# Patient Record
Sex: MALE | Race: WHITE | Employment: UNEMPLOYED | URBAN - METROPOLITAN AREA
[De-identification: names, ages, dates, MRNs, and addresses within clinical notes are randomized per-mention and may not be internally consistent; named-entity substitution may affect disease eponyms.]

---

## 2019-02-13 ENCOUNTER — OFFICE VISIT (OUTPATIENT)
Dept: FAMILY MEDICINE CLINIC | Age: 24
End: 2019-02-13
Payer: COMMERCIAL

## 2019-02-13 VITALS
BODY MASS INDEX: 21.5 KG/M2 | DIASTOLIC BLOOD PRESSURE: 70 MMHG | HEIGHT: 67 IN | WEIGHT: 137 LBS | RESPIRATION RATE: 21 BRPM | SYSTOLIC BLOOD PRESSURE: 110 MMHG | HEART RATE: 84 BPM | OXYGEN SATURATION: 98 %

## 2019-02-13 DIAGNOSIS — Z23 NEED FOR TDAP VACCINATION: ICD-10-CM

## 2019-02-13 DIAGNOSIS — F33.0 MILD EPISODE OF RECURRENT MAJOR DEPRESSIVE DISORDER (HCC): Primary | ICD-10-CM

## 2019-02-13 PROCEDURE — 90715 TDAP VACCINE 7 YRS/> IM: CPT | Performed by: NURSE PRACTITIONER

## 2019-02-13 PROCEDURE — G0444 DEPRESSION SCREEN ANNUAL: HCPCS | Performed by: NURSE PRACTITIONER

## 2019-02-13 PROCEDURE — 90471 IMMUNIZATION ADMIN: CPT | Performed by: NURSE PRACTITIONER

## 2019-02-13 PROCEDURE — 99213 OFFICE O/P EST LOW 20 MIN: CPT | Performed by: NURSE PRACTITIONER

## 2019-02-13 RX ORDER — FLUOXETINE 10 MG/1
10 CAPSULE ORAL DAILY
Qty: 30 CAPSULE | Refills: 1 | Status: SHIPPED | OUTPATIENT
Start: 2019-02-13 | End: 2019-07-02 | Stop reason: ALTCHOICE

## 2019-02-13 ASSESSMENT — PATIENT HEALTH QUESTIONNAIRE - PHQ9
8. MOVING OR SPEAKING SO SLOWLY THAT OTHER PEOPLE COULD HAVE NOTICED. OR THE OPPOSITE, BEING SO FIGETY OR RESTLESS THAT YOU HAVE BEEN MOVING AROUND A LOT MORE THAN USUAL: 2
SUM OF ALL RESPONSES TO PHQ9 QUESTIONS 1 & 2: 3
3. TROUBLE FALLING OR STAYING ASLEEP: 1
7. TROUBLE CONCENTRATING ON THINGS, SUCH AS READING THE NEWSPAPER OR WATCHING TELEVISION: 0
5. POOR APPETITE OR OVEREATING: 3
10. IF YOU CHECKED OFF ANY PROBLEMS, HOW DIFFICULT HAVE THESE PROBLEMS MADE IT FOR YOU TO DO YOUR WORK, TAKE CARE OF THINGS AT HOME, OR GET ALONG WITH OTHER PEOPLE: 1
SUM OF ALL RESPONSES TO PHQ QUESTIONS 1-9: 9
9. THOUGHTS THAT YOU WOULD BE BETTER OFF DEAD, OR OF HURTING YOURSELF: 0
1. LITTLE INTEREST OR PLEASURE IN DOING THINGS: 2
4. FEELING TIRED OR HAVING LITTLE ENERGY: 0
6. FEELING BAD ABOUT YOURSELF - OR THAT YOU ARE A FAILURE OR HAVE LET YOURSELF OR YOUR FAMILY DOWN: 0
SUM OF ALL RESPONSES TO PHQ QUESTIONS 1-9: 9
2. FEELING DOWN, DEPRESSED OR HOPELESS: 1

## 2019-07-02 ENCOUNTER — OFFICE VISIT (OUTPATIENT)
Dept: FAMILY MEDICINE CLINIC | Age: 24
End: 2019-07-02
Payer: COMMERCIAL

## 2019-07-02 ENCOUNTER — TELEPHONE (OUTPATIENT)
Dept: FAMILY MEDICINE CLINIC | Age: 24
End: 2019-07-02

## 2019-07-02 VITALS
OXYGEN SATURATION: 99 % | WEIGHT: 137.2 LBS | HEART RATE: 78 BPM | RESPIRATION RATE: 16 BRPM | SYSTOLIC BLOOD PRESSURE: 134 MMHG | HEIGHT: 67 IN | BODY MASS INDEX: 21.53 KG/M2 | DIASTOLIC BLOOD PRESSURE: 84 MMHG

## 2019-07-02 DIAGNOSIS — R41.840 DIFFICULTY CONCENTRATING: Primary | ICD-10-CM

## 2019-07-02 DIAGNOSIS — F33.0 MILD EPISODE OF RECURRENT MAJOR DEPRESSIVE DISORDER (HCC): Primary | ICD-10-CM

## 2019-07-02 PROCEDURE — 99213 OFFICE O/P EST LOW 20 MIN: CPT | Performed by: NURSE PRACTITIONER

## 2019-07-02 RX ORDER — BUPROPION HYDROCHLORIDE 300 MG/1
300 TABLET ORAL EVERY MORNING
Qty: 30 TABLET | Refills: 1 | Status: SHIPPED | OUTPATIENT
Start: 2019-07-02 | End: 2019-07-15

## 2019-07-02 NOTE — PATIENT INSTRUCTIONS
regular visits. Your family or other caregivers should also be alert to changes in your mood or symptoms. It is not known whether this medicine will harm an unborn baby. Tell your doctor if you are pregnant or plan to become pregnant. Bupropion can pass into breast milk and may harm a nursing baby. You should not breast-feed while using this medicine. Bupropion is not approved for use by anyone younger than 25years old. How should I take bupropion? Follow all directions on your prescription label. Do not take this medicine in larger or smaller amounts or for longer than recommended. Too much of this medicine can increase your risk of a seizure. You may take bupropion with or without food. Do not crush, chew, or break an extended-release tablet. Swallow it whole. You should not change your dose or stop using bupropion suddenly, unless you have a seizure while taking this medicine. Stopping suddenly can cause unpleasant withdrawal symptoms. Ask your doctor how to safely stop using bupropion. If you take Zyban to help you stop smoking, you may continue to smoke for about 1 week after you start the medicine. Set a date to quit smoking during the second week of treatment. Talk to your doctor if you have trouble quitting after taking Zyban for 7 weeks. Your doctor may prescribe nicotine patches or gum to help you stop smoking. Do not smoke at any time if you are using a nicotine product along with Zyban. Too much nicotine can cause serious side effects. Read all patient information, medication guides, and instruction sheets provided to you. Ask your doctor or pharmacist if you have any questions. You may have nicotine withdrawal symptoms when you stop smoking, including: increased appetite, weight gain, trouble sleeping, trouble concentrating, slower heart rate, having the urge to smoke, and feeling anxious, restless, depressed, angry, frustrated, or irritated.  These symptoms may occur with or without using medication such as Zyban. Smoking cessation may also cause new or worsening mental health problems, such as depression. Bupropion can cause you to have a false positive drug screening test. If you provide a urine sample for drug screening, tell the laboratory staff that you are taking bupropion. Store at room temperature away from moisture, heat, and light. What happens if I miss a dose? Take the missed dose as soon as you remember. Skip the missed dose if it is almost time for your next scheduled dose. Do not  take extra medicine to make up the missed dose. What happens if I overdose? Seek emergency medical attention or call the Poison Help line at 1-925.575.3589. An overdose of bupropion can be fatal.  Overdose symptoms may include muscle stiffness, hallucinations, fast or uneven heartbeat, shallow breathing, or fainting. What should I avoid while taking bupropion? Drinking alcohol with bupropion may increase your risk of seizures. If you drink alcohol regularly, talk with your doctor before changing the amount you drink. Bupropion can also cause seizures in a regular drinker who suddenly stops drinking at the start of treatment with bupropion. Bupropion may impair your thinking or reactions. Be careful if you drive or do anything that requires you to be alert. What are the possible side effects of bupropion? Get emergency medical help if you have signs of an allergic reaction: hives, rash or itching; fever, swollen glands, joint pain, general ill feeling; difficult breathing; swelling of your face, lips, tongue, or throat. Report any new or worsening symptoms to your doctor, such as: mood or behavior changes, anxiety, depression, panic attacks, trouble sleeping, or if you feel impulsive, irritable, agitated, hostile, aggressive, restless, hyperactive (mentally or physically), or have thoughts about suicide or hurting yourself.   Call your doctor at once if you have:  · a seizure (convulsions);  · unusual changes in mood or behavior;  · a manic episode --racing thoughts, increased energy, reckless behavior, feeling extremely happy or irritable, talking more than usual, severe problems with sleep;  · blurred vision, tunnel vision, eye pain or swelling, or seeing halos around lights;  · fast heartbeats; or  · severe skin reaction --fever, sore throat, swelling in your face or tongue, burning in your eyes, skin pain, followed by a red or purple skin rash that spreads (especially in the face or upper body) and causes blistering and peeling. Common side effects may include:  · dry mouth, stuffy nose;  · nausea, constipation;  · sleep problems (insomnia);  · feeling anxious;  · dizziness; or  · joint pain. This is not a complete list of side effects and others may occur. Call your doctor for medical advice about side effects. You may report side effects to FDA at 8-211-FDA-1247. What other drugs will affect bupropion? You may have a higher risk of seizures if you use certain other medicines while taking bupropion. Many drugs can interact with bupropion. Tell your doctor about all medicines you use, and those you start or stop using during your treatment with bupropion. This includes prescription and over-the-counter medicines, vitamins, and herbal products. Not all possible interactions are listed in this medication guide. Where can I get more information? Your pharmacist can provide more information about bupropion. Remember, keep this and all other medicines out of the reach of children, never share your medicines with others, and use this medication only for the indication prescribed. Every effort has been made to ensure that the information provided by [address] is accurate, up-to-date, and complete, but no guarantee is made to that effect. Drug information contained herein may be time sensitive.  [employer] information has been compiled for use by healthcare

## 2019-07-03 DIAGNOSIS — R41.840 DIFFICULTY CONCENTRATING: Primary | ICD-10-CM

## 2019-07-14 NOTE — PROGRESS NOTES
and thought content normal. His mood appears anxious. His speech is rapid and/or pressured (talking non-stop). He is hyperactive. Cognition and memory are normal.   He is hesitant to take antidepressant, or any medication that causes drowsiness. Vitals reviewed. ASSESSMENT/PLAN:  1. Attention deficit hyperactivity disorder (ADHD), unspecified ADHD type  -start taking Vyvanse daily  - Lisdexamfetamine Dimesylate (VYVANSE) 20 MG CAPS; Take 1 capsule by mouth daily for 30 days. Dispense: 30 capsule; Refill: 0  -Make appointment with Psychiatry    2. QING (generalized anxiety disorder)  - anxiety vs. Bipolar disorder  -Make appointment with Psychiatry  -will consider Buspar/ propanolol     3. Mild episode of recurrent major depressive disorder ([facility]  -Major depression vs bipolar diorder  -Make appointment with Psychiatry  -consider mood stabilizer (Vraylar/ Seroquel)    Return in about 2 weeks (around 7/29/2019) for ADHD, ANXIETY, DEPRESSION.       --HAMLET Jin - CNP on 7/15/2019 at 4:10 PM

## 2019-07-15 ENCOUNTER — OFFICE VISIT (OUTPATIENT)
Dept: INTERNAL MEDICINE CLINIC | Age: 24
End: 2019-07-15
Payer: COMMERCIAL

## 2019-07-15 ENCOUNTER — TELEPHONE (OUTPATIENT)
Dept: INTERNAL MEDICINE CLINIC | Age: 24
End: 2019-07-15

## 2019-07-15 VITALS
SYSTOLIC BLOOD PRESSURE: 115 MMHG | HEIGHT: 67 IN | TEMPERATURE: 97.8 F | OXYGEN SATURATION: 99 % | HEART RATE: 71 BPM | BODY MASS INDEX: 21.66 KG/M2 | WEIGHT: 138 LBS | RESPIRATION RATE: 16 BRPM | DIASTOLIC BLOOD PRESSURE: 80 MMHG

## 2019-07-15 DIAGNOSIS — F41.1 GAD (GENERALIZED ANXIETY DISORDER): ICD-10-CM

## 2019-07-15 DIAGNOSIS — F90.9 ATTENTION DEFICIT HYPERACTIVITY DISORDER (ADHD), UNSPECIFIED ADHD TYPE: Primary | ICD-10-CM

## 2019-07-15 DIAGNOSIS — F33.0 MILD EPISODE OF RECURRENT MAJOR DEPRESSIVE DISORDER (HCC): ICD-10-CM

## 2019-07-15 PROCEDURE — 99213 OFFICE O/P EST LOW 20 MIN: CPT | Performed by: NURSE PRACTITIONER

## 2019-07-15 RX ORDER — VILAZODONE HYDROCHLORIDE 10 MG/1
10 TABLET ORAL DAILY
Qty: 7 TABLET | Status: CANCELLED | OUTPATIENT
Start: 2019-07-15

## 2019-07-15 SDOH — SOCIAL STABILITY: SOCIAL INSECURITY: WITHIN THE LAST YEAR, HAVE YOU BEEN HUMILIATED OR EMOTIONALLY ABUSED IN OTHER WAYS BY YOUR PARTNER OR EX-PARTNER?: NO

## 2019-07-15 SDOH — SOCIAL STABILITY: SOCIAL INSECURITY: WITHIN THE LAST YEAR, HAVE YOU BEEN AFRAID OF YOUR PARTNER OR EX-PARTNER?: NO

## 2019-07-15 SDOH — SOCIAL STABILITY: SOCIAL INSECURITY
WITHIN THE LAST YEAR, HAVE TO BEEN RAPED OR FORCED TO HAVE ANY KIND OF SEXUAL ACTIVITY BY YOUR PARTNER OR EX-PARTNER?: NO

## 2019-07-15 SDOH — SOCIAL STABILITY: SOCIAL INSECURITY
WITHIN THE LAST YEAR, HAVE YOU BEEN KICKED, HIT, SLAPPED, OR OTHERWISE PHYSICALLY HURT BY YOUR PARTNER OR EX-PARTNER?: NO

## 2019-07-15 ASSESSMENT — ENCOUNTER SYMPTOMS
COUGH: 0
SHORTNESS OF BREATH: 0
CHEST TIGHTNESS: 0

## 2019-07-16 PROBLEM — F90.0 ATTENTION DEFICIT HYPERACTIVITY DISORDER (ADHD), PREDOMINANTLY INATTENTIVE TYPE: Status: ACTIVE | Noted: 2019-07-16

## 2019-07-17 ENCOUNTER — TELEPHONE (OUTPATIENT)
Dept: PAIN MANAGEMENT | Age: 24
End: 2019-07-17

## 2019-07-17 NOTE — TELEPHONE ENCOUNTER
Submitted PA for Vyvanse 20MG capsules, Key: [id number] - PA Case ID: [id number]  Via CMM STATUS: PENDING

## 2019-07-18 ENCOUNTER — TELEPHONE (OUTPATIENT)
Dept: INTERNAL MEDICINE CLINIC | Age: 24
End: 2019-07-18

## 2019-07-18 NOTE — TELEPHONE ENCOUNTER
Received APPROVAL for Vyvanse 20MG capsules from 06/17/2019 through 08/05/2019. Please advise patient. Thank you.

## 2019-07-29 ENCOUNTER — OFFICE VISIT (OUTPATIENT)
Dept: INTERNAL MEDICINE CLINIC | Age: 24
End: 2019-07-29
Payer: COMMERCIAL

## 2019-07-29 VITALS
HEIGHT: 67 IN | BODY MASS INDEX: 21.72 KG/M2 | SYSTOLIC BLOOD PRESSURE: 137 MMHG | HEART RATE: 96 BPM | DIASTOLIC BLOOD PRESSURE: 79 MMHG | OXYGEN SATURATION: 99 % | WEIGHT: 138.4 LBS

## 2019-07-29 DIAGNOSIS — F90.0 ATTENTION DEFICIT HYPERACTIVITY DISORDER (ADHD), PREDOMINANTLY INATTENTIVE TYPE: ICD-10-CM

## 2019-07-29 DIAGNOSIS — F33.0 MILD EPISODE OF RECURRENT MAJOR DEPRESSIVE DISORDER (HCC): ICD-10-CM

## 2019-07-29 DIAGNOSIS — F41.1 GAD (GENERALIZED ANXIETY DISORDER): Primary | ICD-10-CM

## 2019-07-29 DIAGNOSIS — Z00.00 HEALTHCARE MAINTENANCE: ICD-10-CM

## 2019-07-29 LAB — HBA1C MFR BLD: 5.3 %

## 2019-07-29 PROCEDURE — 99213 OFFICE O/P EST LOW 20 MIN: CPT | Performed by: NURSE PRACTITIONER

## 2019-07-29 PROCEDURE — 83036 HEMOGLOBIN GLYCOSYLATED A1C: CPT | Performed by: NURSE PRACTITIONER

## 2019-07-29 ASSESSMENT — ENCOUNTER SYMPTOMS
CONSTIPATION: 0
CHEST TIGHTNESS: 0
DIARRHEA: 0
ABDOMINAL PAIN: 0
SHORTNESS OF BREATH: 0

## 2019-07-29 NOTE — PROGRESS NOTES
Patient was informed that the HIV testing will be included in there blood work. Patient is ok with having the test included.

## 2019-07-30 LAB
A/G RATIO: 2.6 (ref 1.1–2.2)
ALBUMIN SERPL-MCNC: 5.2 G/DL (ref 3.4–5)
ALP BLD-CCNC: 62 U/L (ref 40–129)
ALT SERPL-CCNC: 14 U/L (ref 10–40)
ANION GAP SERPL CALCULATED.3IONS-SCNC: 15 MMOL/L (ref 3–16)
AST SERPL-CCNC: 20 U/L (ref 15–37)
BILIRUB SERPL-MCNC: 0.3 MG/DL (ref 0–1)
BUN BLDV-MCNC: 12 MG/DL (ref 7–20)
CALCIUM SERPL-MCNC: 9.7 MG/DL (ref 8.3–10.6)
CHLORIDE BLD-SCNC: 101 MMOL/L (ref 99–110)
CO2: 24 MMOL/L (ref 21–32)
CREAT SERPL-MCNC: 1 MG/DL (ref 0.9–1.3)
ESTIMATED AVERAGE GLUCOSE: 116.9 MG/DL
GFR AFRICAN AMERICAN: >60
GFR NON-AFRICAN AMERICAN: >60
GLOBULIN: 2 G/DL
GLUCOSE BLD-MCNC: 136 MG/DL (ref 70–99)
HBA1C MFR BLD: 5.7 %
HIV AG/AB: NORMAL
HIV ANTIGEN: NORMAL
HIV-1 ANTIBODY: NORMAL
HIV-2 AB: NORMAL
POTASSIUM SERPL-SCNC: 4.2 MMOL/L (ref 3.5–5.1)
SODIUM BLD-SCNC: 140 MMOL/L (ref 136–145)
T4 FREE: 1.5 NG/DL (ref 0.9–1.8)
TOTAL PROTEIN: 7.2 G/DL (ref 6.4–8.2)
TSH REFLEX: 1.36 UIU/ML (ref 0.27–4.2)

## 2019-08-02 LAB
6-ACETYLMORPHINE: NOT DETECTED
7-AMINOCLONAZEPAM: NOT DETECTED
ALPHA-OH-ALPRAZOLAM: NOT DETECTED
ALPRAZOLAM: NOT DETECTED
AMPHETAMINE: PRESENT
BARBITURATES: NOT DETECTED
BENZOYLECGONINE: NOT DETECTED
BUPRENORPHINE: NOT DETECTED
CARISOPRODOL: NOT DETECTED
CLONAZEPAM: NOT DETECTED
CODEINE: NOT DETECTED
CREATININE URINE: 108 MG/DL (ref 20–400)
DIAZEPAM: NOT DETECTED
DRUGS EXPECTED: NORMAL
EER PAIN MGT DRUG PANEL, HIGH RES/EMIT U: NORMAL
ETHYL GLUCURONIDE: NOT DETECTED
FENTANYL: NOT DETECTED
HYDROCODONE: NOT DETECTED
HYDROMORPHONE: NOT DETECTED
LORAZEPAM: NOT DETECTED
MARIJUANA METABOLITE: PRESENT
MDA: NOT DETECTED
MDEA: NOT DETECTED
MDMA URINE: NOT DETECTED
MEPERIDINE: NOT DETECTED
METHADONE: NOT DETECTED
METHAMPHETAMINE: NOT DETECTED
METHYLPHENIDATE: NOT DETECTED
MIDAZOLAM: NOT DETECTED
MORPHINE: NOT DETECTED
NORBUPRENORPHINE, FREE: NOT DETECTED
NORDIAZEPAM: NOT DETECTED
NORFENTANYL: NOT DETECTED
NORHYDROCODONE, URINE: NOT DETECTED
NOROXYCODONE: NOT DETECTED
NOROXYMORPHONE, URINE: NOT DETECTED
OXAZEPAM: NOT DETECTED
OXYCODONE: NOT DETECTED
OXYMORPHONE: NOT DETECTED
PAIN MANAGEMENT DRUG PANEL: NORMAL
PAIN MANAGEMENT DRUG PANEL: NORMAL
PCP: NOT DETECTED
PHENTERMINE: NOT DETECTED
PROPOXYPHENE: NOT DETECTED
TAPENTADOL, URINE: NOT DETECTED
TAPENTADOL-O-SULFATE, URINE: NOT DETECTED
TEMAZEPAM: NOT DETECTED
TRAMADOL: NOT DETECTED
ZOLPIDEM: NOT DETECTED

## 2019-08-23 ENCOUNTER — TELEPHONE (OUTPATIENT)
Dept: INTERNAL MEDICINE CLINIC | Age: 24
End: 2019-08-23

## 2019-09-03 ENCOUNTER — TELEPHONE (OUTPATIENT)
Dept: INTERNAL MEDICINE CLINIC | Age: 24
End: 2019-09-03

## 2019-09-03 ENCOUNTER — OFFICE VISIT (OUTPATIENT)
Dept: INTERNAL MEDICINE CLINIC | Age: 24
End: 2019-09-03
Payer: COMMERCIAL

## 2019-09-03 VITALS
DIASTOLIC BLOOD PRESSURE: 84 MMHG | WEIGHT: 139 LBS | SYSTOLIC BLOOD PRESSURE: 116 MMHG | OXYGEN SATURATION: 99 % | HEART RATE: 86 BPM | BODY MASS INDEX: 21.64 KG/M2

## 2019-09-03 DIAGNOSIS — Z72.0 TOBACCO ABUSE: ICD-10-CM

## 2019-09-03 DIAGNOSIS — F41.1 GAD (GENERALIZED ANXIETY DISORDER): ICD-10-CM

## 2019-09-03 DIAGNOSIS — F90.0 ATTENTION DEFICIT HYPERACTIVITY DISORDER (ADHD), PREDOMINANTLY INATTENTIVE TYPE: Primary | ICD-10-CM

## 2019-09-03 PROCEDURE — 99213 OFFICE O/P EST LOW 20 MIN: CPT | Performed by: NURSE PRACTITIONER

## 2019-09-03 ASSESSMENT — ENCOUNTER SYMPTOMS
CONSTIPATION: 0
ABDOMINAL PAIN: 0
SHORTNESS OF BREATH: 0
CHEST TIGHTNESS: 0
DIARRHEA: 0

## 2019-09-04 ENCOUNTER — TELEPHONE (OUTPATIENT)
Dept: INTERNAL MEDICINE CLINIC | Age: 24
End: 2019-09-04

## 2019-09-04 NOTE — TELEPHONE ENCOUNTER
vynase is not covered under insurance . Need something else. Allergies see list. xiomara [phone]. Also please call him back at [phone].

## 2019-09-06 DIAGNOSIS — F90.0 ATTENTION DEFICIT HYPERACTIVITY DISORDER (ADHD), PREDOMINANTLY INATTENTIVE TYPE: Primary | ICD-10-CM

## 2019-09-06 RX ORDER — DEXTROAMPHETAMINE SACCHARATE, AMPHETAMINE ASPARTATE, DEXTROAMPHETAMINE SULFATE AND AMPHETAMINE SULFATE 5; 5; 5; 5 MG/1; MG/1; MG/1; MG/1
20 TABLET ORAL 2 TIMES DAILY
Qty: 60 TABLET | Refills: 0 | Status: SHIPPED | OUTPATIENT
Start: 2019-09-06 | End: 2019-10-11

## 2019-09-11 ENCOUNTER — OFFICE VISIT (OUTPATIENT)
Dept: INTERNAL MEDICINE CLINIC | Age: 24
End: 2019-09-11
Payer: COMMERCIAL

## 2019-09-11 ENCOUNTER — TELEPHONE (OUTPATIENT)
Dept: INTERNAL MEDICINE CLINIC | Age: 24
End: 2019-09-11

## 2019-09-11 VITALS
SYSTOLIC BLOOD PRESSURE: 137 MMHG | WEIGHT: 137.6 LBS | HEIGHT: 67 IN | OXYGEN SATURATION: 98 % | HEART RATE: 96 BPM | DIASTOLIC BLOOD PRESSURE: 81 MMHG | BODY MASS INDEX: 21.6 KG/M2

## 2019-09-11 DIAGNOSIS — F90.0 ATTENTION DEFICIT HYPERACTIVITY DISORDER (ADHD), PREDOMINANTLY INATTENTIVE TYPE: Primary | ICD-10-CM

## 2019-09-11 PROCEDURE — 90686 IIV4 VACC NO PRSV 0.5 ML IM: CPT | Performed by: NURSE PRACTITIONER

## 2019-09-11 PROCEDURE — 99212 OFFICE O/P EST SF 10 MIN: CPT | Performed by: NURSE PRACTITIONER

## 2019-09-11 PROCEDURE — 90471 IMMUNIZATION ADMIN: CPT | Performed by: NURSE PRACTITIONER

## 2019-09-11 ASSESSMENT — ENCOUNTER SYMPTOMS
SHORTNESS OF BREATH: 0
CHEST TIGHTNESS: 0

## 2019-09-11 NOTE — PROGRESS NOTES
20MG,) 20 MG tablet Take 1 tablet by mouth 2 times daily for 30 days. Yes HAMLET Bauer CNP        Social History     Tobacco Use    Smoking status: Former Smoker     Packs/day: 0.25     Years: 8.00     Pack years: 2.00     Types: Cigarettes     Start date: 2007     Last attempt to quit: 7/3/2019     Years since quittin.1    Smokeless tobacco: Never Used    Tobacco comment: using E cig   Substance Use Topics    Alcohol use: No     Alcohol/week: 0.0 standard drinks     Comment: rarely        Vitals:    19 1342   BP: 137/81   Pulse: 96   SpO2: 98%   Weight: 137 lb 9.6 oz (62.4 kg)   Height: 5' 7.2\" (1.707 m)     Estimated body mass index is 21.42 kg/m² as calculated from the following:    Height as of this encounter: 5' 7.2\" (1.707 m). Weight as of this encounter: 137 lb 9.6 oz (62.4 kg). Physical Exam   Constitutional: He is oriented to person, place, and time. Vital signs are normal. He appears well-developed and well-nourished. He is cooperative. HENT:   Head: Normocephalic. Cardiovascular: Normal rate, regular rhythm, S1 normal, S2 normal, normal heart sounds and intact distal pulses. Pulmonary/Chest: Effort normal and breath sounds normal.   Neurological: He is alert and oriented to person, place, and time. Skin: Skin is warm and dry. ASSESSMENT/PLAN:  1. Attention deficit hyperactivity disorder (ADHD), predominantly inattentive type  Increase water intake  4-6 servings of fruits and vegetables daily  Drink 4-6 glasses of water daily  Take Adderall 20 mg once daily, if needed in the afternoon take 10 mg. No follow-ups on file.         --HAMLET Bauer CNP on 2019 at 4:27 PM

## 2019-09-17 ENCOUNTER — TELEPHONE (OUTPATIENT)
Dept: INTERNAL MEDICINE CLINIC | Age: 24
End: 2019-09-17

## 2019-10-11 ENCOUNTER — OFFICE VISIT (OUTPATIENT)
Dept: PSYCHIATRY | Age: 24
End: 2019-10-11
Payer: COMMERCIAL

## 2019-10-11 VITALS
WEIGHT: 141.8 LBS | BODY MASS INDEX: 22.26 KG/M2 | DIASTOLIC BLOOD PRESSURE: 82 MMHG | SYSTOLIC BLOOD PRESSURE: 129 MMHG | HEART RATE: 88 BPM | HEIGHT: 67 IN

## 2019-10-11 DIAGNOSIS — F90.0 ATTENTION DEFICIT HYPERACTIVITY DISORDER (ADHD), PREDOMINANTLY INATTENTIVE TYPE: ICD-10-CM

## 2019-10-11 PROCEDURE — 99204 OFFICE O/P NEW MOD 45 MIN: CPT | Performed by: NURSE PRACTITIONER

## 2019-10-11 RX ORDER — DEXTROAMPHETAMINE SACCHARATE, AMPHETAMINE ASPARTATE, DEXTROAMPHETAMINE SULFATE AND AMPHETAMINE SULFATE 5; 5; 5; 5 MG/1; MG/1; MG/1; MG/1
20 TABLET ORAL 2 TIMES DAILY
Qty: 60 TABLET | Refills: 0 | Status: SHIPPED | OUTPATIENT
Start: 2019-10-11 | End: 2019-11-07 | Stop reason: SDUPTHER

## 2019-10-11 ASSESSMENT — PATIENT HEALTH QUESTIONNAIRE - PHQ9
SUM OF ALL RESPONSES TO PHQ9 QUESTIONS 1 & 2: 0
9. THOUGHTS THAT YOU WOULD BE BETTER OFF DEAD, OR OF HURTING YOURSELF: 0
7. TROUBLE CONCENTRATING ON THINGS, SUCH AS READING THE NEWSPAPER OR WATCHING TELEVISION: 2
3. TROUBLE FALLING OR STAYING ASLEEP: 1
5. POOR APPETITE OR OVEREATING: 2
4. FEELING TIRED OR HAVING LITTLE ENERGY: 0
SUM OF ALL RESPONSES TO PHQ QUESTIONS 1-9: 7
1. LITTLE INTEREST OR PLEASURE IN DOING THINGS: 0
8. MOVING OR SPEAKING SO SLOWLY THAT OTHER PEOPLE COULD HAVE NOTICED. OR THE OPPOSITE, BEING SO FIGETY OR RESTLESS THAT YOU HAVE BEEN MOVING AROUND A LOT MORE THAN USUAL: 2
SUM OF ALL RESPONSES TO PHQ QUESTIONS 1-9: 7
2. FEELING DOWN, DEPRESSED OR HOPELESS: 0
10. IF YOU CHECKED OFF ANY PROBLEMS, HOW DIFFICULT HAVE THESE PROBLEMS MADE IT FOR YOU TO DO YOUR WORK, TAKE CARE OF THINGS AT HOME, OR GET ALONG WITH OTHER PEOPLE: 1
6. FEELING BAD ABOUT YOURSELF - OR THAT YOU ARE A FAILURE OR HAVE LET YOURSELF OR YOUR FAMILY DOWN: 0

## 2019-10-11 ASSESSMENT — ANXIETY QUESTIONNAIRES
4. TROUBLE RELAXING: 2-OVER HALF THE DAYS
7. FEELING AFRAID AS IF SOMETHING AWFUL MIGHT HAPPEN: 0-NOT AT ALL SURE
5. BEING SO RESTLESS THAT IT IS HARD TO SIT STILL: 0-NOT AT ALL SURE
2. NOT BEING ABLE TO STOP OR CONTROL WORRYING: 0-NOT AT ALL SURE
1. FEELING NERVOUS, ANXIOUS, OR ON EDGE: 2-OVER HALF THE DAYS
GAD7 TOTAL SCORE: 6
3. WORRYING TOO MUCH ABOUT DIFFERENT THINGS: 2-OVER HALF THE DAYS
6. BECOMING EASILY ANNOYED OR IRRITABLE: 0-NOT AT ALL SURE

## 2019-11-05 DIAGNOSIS — F90.0 ATTENTION DEFICIT HYPERACTIVITY DISORDER (ADHD), PREDOMINANTLY INATTENTIVE TYPE: ICD-10-CM

## 2019-11-07 RX ORDER — DEXTROAMPHETAMINE SACCHARATE, AMPHETAMINE ASPARTATE, DEXTROAMPHETAMINE SULFATE AND AMPHETAMINE SULFATE 5; 5; 5; 5 MG/1; MG/1; MG/1; MG/1
20 TABLET ORAL 2 TIMES DAILY
Qty: 60 TABLET | Refills: 0 | Status: SHIPPED | OUTPATIENT
Start: 2019-11-07 | End: 2019-12-02 | Stop reason: SDUPTHER

## 2019-12-02 ENCOUNTER — OFFICE VISIT (OUTPATIENT)
Dept: INTERNAL MEDICINE CLINIC | Age: 24
End: 2019-12-02
Payer: COMMERCIAL

## 2019-12-02 VITALS
SYSTOLIC BLOOD PRESSURE: 116 MMHG | OXYGEN SATURATION: 98 % | HEART RATE: 91 BPM | WEIGHT: 140.8 LBS | BODY MASS INDEX: 21.34 KG/M2 | DIASTOLIC BLOOD PRESSURE: 80 MMHG | HEIGHT: 68 IN

## 2019-12-02 DIAGNOSIS — F90.0 ATTENTION DEFICIT HYPERACTIVITY DISORDER (ADHD), PREDOMINANTLY INATTENTIVE TYPE: ICD-10-CM

## 2019-12-02 PROCEDURE — 99212 OFFICE O/P EST SF 10 MIN: CPT | Performed by: NURSE PRACTITIONER

## 2019-12-02 RX ORDER — DEXTROAMPHETAMINE SACCHARATE, AMPHETAMINE ASPARTATE, DEXTROAMPHETAMINE SULFATE AND AMPHETAMINE SULFATE 5; 5; 5; 5 MG/1; MG/1; MG/1; MG/1
20 TABLET ORAL 2 TIMES DAILY
Qty: 60 TABLET | Refills: 0 | Status: SHIPPED | OUTPATIENT
Start: 2020-01-01 | End: 2020-03-31

## 2019-12-02 RX ORDER — DEXTROAMPHETAMINE SACCHARATE, AMPHETAMINE ASPARTATE, DEXTROAMPHETAMINE SULFATE AND AMPHETAMINE SULFATE 5; 5; 5; 5 MG/1; MG/1; MG/1; MG/1
20 TABLET ORAL 2 TIMES DAILY
Qty: 60 TABLET | Refills: 0 | Status: SHIPPED | OUTPATIENT
Start: 2020-02-01 | End: 2020-03-31

## 2019-12-02 RX ORDER — DEXTROAMPHETAMINE SACCHARATE, AMPHETAMINE ASPARTATE, DEXTROAMPHETAMINE SULFATE AND AMPHETAMINE SULFATE 5; 5; 5; 5 MG/1; MG/1; MG/1; MG/1
20 TABLET ORAL 2 TIMES DAILY
Qty: 60 TABLET | Refills: 0 | Status: SHIPPED | OUTPATIENT
Start: 2019-12-02 | End: 2020-03-04 | Stop reason: SDUPTHER

## 2019-12-02 ASSESSMENT — ENCOUNTER SYMPTOMS
SHORTNESS OF BREATH: 0
CHEST TIGHTNESS: 0

## 2020-03-04 RX ORDER — DEXTROAMPHETAMINE SACCHARATE, AMPHETAMINE ASPARTATE, DEXTROAMPHETAMINE SULFATE AND AMPHETAMINE SULFATE 5; 5; 5; 5 MG/1; MG/1; MG/1; MG/1
20 TABLET ORAL 2 TIMES DAILY
Qty: 60 TABLET | Refills: 0 | Status: SHIPPED | OUTPATIENT
Start: 2020-03-04 | End: 2020-03-30 | Stop reason: SDUPTHER

## 2020-03-05 ENCOUNTER — OFFICE VISIT (OUTPATIENT)
Dept: INTERNAL MEDICINE CLINIC | Age: 25
End: 2020-03-05

## 2020-03-05 VITALS
HEIGHT: 67 IN | DIASTOLIC BLOOD PRESSURE: 70 MMHG | BODY MASS INDEX: 22.22 KG/M2 | SYSTOLIC BLOOD PRESSURE: 116 MMHG | OXYGEN SATURATION: 96 % | HEART RATE: 106 BPM | RESPIRATION RATE: 16 BRPM | WEIGHT: 141.6 LBS

## 2020-03-05 PROCEDURE — 99212 OFFICE O/P EST SF 10 MIN: CPT | Performed by: NURSE PRACTITIONER

## 2020-03-05 SDOH — ECONOMIC STABILITY: TRANSPORTATION INSECURITY
IN THE PAST 12 MONTHS, HAS LACK OF TRANSPORTATION KEPT YOU FROM MEETINGS, WORK, OR FROM GETTING THINGS NEEDED FOR DAILY LIVING?: NO

## 2020-03-05 SDOH — ECONOMIC STABILITY: FOOD INSECURITY: WITHIN THE PAST 12 MONTHS, YOU WORRIED THAT YOUR FOOD WOULD RUN OUT BEFORE YOU GOT MONEY TO BUY MORE.: NEVER TRUE

## 2020-03-05 SDOH — ECONOMIC STABILITY: TRANSPORTATION INSECURITY
IN THE PAST 12 MONTHS, HAS THE LACK OF TRANSPORTATION KEPT YOU FROM MEDICAL APPOINTMENTS OR FROM GETTING MEDICATIONS?: NO

## 2020-03-05 SDOH — ECONOMIC STABILITY: FOOD INSECURITY: WITHIN THE PAST 12 MONTHS, THE FOOD YOU BOUGHT JUST DIDN'T LAST AND YOU DIDN'T HAVE MONEY TO GET MORE.: NEVER TRUE

## 2020-03-05 ASSESSMENT — ENCOUNTER SYMPTOMS
DIARRHEA: 0
SHORTNESS OF BREATH: 0
CONSTIPATION: 0
ABDOMINAL PAIN: 0

## 2020-03-05 ASSESSMENT — PATIENT HEALTH QUESTIONNAIRE - PHQ9
SUM OF ALL RESPONSES TO PHQ9 QUESTIONS 1 & 2: 1
SUM OF ALL RESPONSES TO PHQ QUESTIONS 1-9: 1
2. FEELING DOWN, DEPRESSED OR HOPELESS: 1
SUM OF ALL RESPONSES TO PHQ QUESTIONS 1-9: 1
1. LITTLE INTEREST OR PLEASURE IN DOING THINGS: 0

## 2020-03-05 NOTE — PROGRESS NOTES
[address] [facility]  [address]  [address] [facility] [place] [address]  Dept: [phone]       3/5/2020     Lewis Porter (:  1995)is a 30 y.o. male, here for evaluation of the following medical concerns: This is an established patient being seen today for ADHD. He reports having several job ventures which he is excited about, living with his mother, raising 3year old daughter without the daughters mother involved. HPI  ADD/ADHD:  Current treatment: Adderall- 20 mg twice daily, which has been effective. Residual symptoms: none. Medication side effects: poor appetite. Patient denies nausea, vomiting, abdominal pain, palpitations, irritability and anxiety. Lab Results   Component Value Date    WBC 7.3 2016    HGB 14.6 2016    HCT 43.6 2016    MCV 92.1 2016     2016     Lab Results   Component Value Date     2019    K 4.2 2019     2019    CO2 24 2019    BUN 12 2019    CREATININE 1.0 2019    GLUCOSE 136 (H) 2019    CALCIUM 9.7 2019    PROT 7.2 2019    LABALBU 5.2 (H) 2019    BILITOT 0.3 2019    ALKPHOS 62 2019    AST 20 2019    ALT 14 2019    LABGLOM >60 2019    GFRAA >60 2019    AGRATIO 2.6 (H) 2019    GLOB 2.0 2019       Review of Systems   Constitutional: Positive for appetite change (decreased appetite). Negative for activity change and fever. HENT: Negative for congestion. Eyes: Negative for visual disturbance. Respiratory: Negative for shortness of breath. Cardiovascular: Negative for chest pain, palpitations and leg swelling. Gastrointestinal: Negative for abdominal pain, constipation and diarrhea. Genitourinary: Negative for dysuria. Musculoskeletal: Negative for arthralgias and myalgias. Skin: Negative for rash. Neurological: Negative for dizziness, light-headedness and headaches.

## 2020-04-02 RX ORDER — DEXTROAMPHETAMINE SACCHARATE, AMPHETAMINE ASPARTATE, DEXTROAMPHETAMINE SULFATE AND AMPHETAMINE SULFATE 5; 5; 5; 5 MG/1; MG/1; MG/1; MG/1
20 TABLET ORAL 2 TIMES DAILY
Qty: 14 TABLET | Refills: 0 | Status: SHIPPED | OUTPATIENT
Start: 2020-04-02 | End: 2020-04-09 | Stop reason: SDUPTHER

## 2020-04-09 ENCOUNTER — VIRTUAL VISIT (OUTPATIENT)
Dept: INTERNAL MEDICINE CLINIC | Age: 25
End: 2020-04-09

## 2020-04-09 ENCOUNTER — TELEPHONE (OUTPATIENT)
Dept: INTERNAL MEDICINE CLINIC | Age: 25
End: 2020-04-09

## 2020-04-09 PROCEDURE — 99212 OFFICE O/P EST SF 10 MIN: CPT | Performed by: NURSE PRACTITIONER

## 2020-04-09 RX ORDER — DEXTROAMPHETAMINE SACCHARATE, AMPHETAMINE ASPARTATE, DEXTROAMPHETAMINE SULFATE AND AMPHETAMINE SULFATE 5; 5; 5; 5 MG/1; MG/1; MG/1; MG/1
20 TABLET ORAL 2 TIMES DAILY
Qty: 60 TABLET | Refills: 0 | Status: SHIPPED | OUTPATIENT
Start: 2020-04-09 | End: 2020-05-04 | Stop reason: SDUPTHER

## 2020-04-09 NOTE — TELEPHONE ENCOUNTER
Patient called back and said Dr. Fred Mary only filled for 7 days while PCP on vacay for a week. He will be taking the last one today. Wanting the rest of the Rx called in today if possible. Please call pt back to advise.

## 2020-04-09 NOTE — PROGRESS NOTES
[address] [facility]  [address] [address]  Dept: [phone]       2020     Mallorie Barrett (:  1995)is a 30 y.o. male, here for evaluation of the following medical concerns:  Due to the COVID-19 pandemic restrictions on close contact interactions as recommended by CDC and health authorities, the patient's visit was conducted via telemedicine in lieu of a face to face visit. Patient verbally consented and agreed to proceed. HPI  He reports having several job ventures which he is excited about, living with his mother, raising 3year old daughter. ADD/ADHD:  Current treatment: Adderall- 20 mg twice daily, which has been effective. Residual symptoms: none. Medication side effects: poor appetite. Patient denies nausea, vomiting, abdominal pain, palpitations, irritability. He would like to switch back to Vyvanse once insurance changes. Lab Results   Component Value Date    WBC 7.3 2016    HGB 14.6 2016    HCT 43.6 2016    MCV 92.1 2016     2016     Lab Results   Component Value Date     2019    K 4.2 2019     2019    CO2 24 2019    BUN 12 2019    CREATININE 1.0 2019    GLUCOSE 136 (H) 2019    CALCIUM 9.7 2019    PROT 7.2 2019    LABALBU 5.2 (H) 2019    BILITOT 0.3 2019    ALKPHOS 62 2019    AST 20 2019    ALT 14 2019    LABGLOM >60 2019    GFRAA >60 2019    AGRATIO 2.6 (H) 2019    GLOB 2.0 2019       Review of Systems    Prior to Visit Medications    Medication Sig Taking? Authorizing Provider   amphetamine-dextroamphetamine (ADDERALL, 20MG,) 20 MG tablet Take 1 tablet by mouth 2 times daily for 30 days.  Yes HAMLET Shukla CNP        Social History     Tobacco Use    Smoking status: Former Smoker     Packs/day: 0.25     Years: 8.00     Pack years: 2.00     Types: Cigarettes Start date: 2007     Last attempt to quit: 7/3/2019     Years since quittin.7    Smokeless tobacco: Never Used    Tobacco comment: using E cig   Substance Use Topics    Alcohol use: No     Alcohol/week: 0.0 standard drinks     Comment: rarely        There were no vitals filed for this visit. Estimated body mass index is 22.18 kg/m² as calculated from the following:    Height as of 3/5/20: 5' 7\" (1.702 m). Weight as of 3/5/20: 141 lb 9.6 oz (64.2 kg). Limited assessment due to virtual visit  Physical Exam  Alert and oriented X3. Able to follow commands. Speech and behavioral is normal  ASSESSMENT/PLAN:  1. Attention deficit hyperactivity disorder (ADHD), predominantly inattentive type  -Continue current medications. - amphetamine-dextroamphetamine (ADDERALL, 20MG,) 20 MG tablet; Take 1 tablet by mouth 2 times daily for 30 days. Dispense: 60 tablet; Refill: 0      No follow-ups on file. Maikel Levin is a 30 y.o. male being evaluated by a Virtual Visit (video visit) encounter to address concerns as mentioned above. A caregiver was present when appropriate. Due to this being a TeleHealth encounter (During [place] [address] public health emergency), evaluation of the following organ systems was limited: Vitals/Constitutional/EENT/Resp/CV/GI//MS/Neuro/Skin/Heme-Lymph-Imm. Pursuant to the emergency declaration under the [address] [place] [address] and the [employer] and [employer] Act, this Virtual Visit was conducted with patient's (and/or legal guardian's) consent, to reduce the patient's risk of exposure to COVID-19 and provide necessary medical care. The patient (and/or legal guardian) has also been advised to contact this office for worsening conditions or problems, and seek emergency medical treatment and/or call 911 if deemed necessary.      Services were provided through a video synchronous discussion virtually to substitute for in-person clinic visit. Patient and provider were located at their individual homes. --HAMLET Berkowitz CNP on 4/9/2020 at 2:06 PM    An electronic signature was used to authenticate this note.     --HAMLET Berkowitz CNP on 4/9/2020 at 2:06 PM

## 2020-05-04 RX ORDER — DEXTROAMPHETAMINE SACCHARATE, AMPHETAMINE ASPARTATE, DEXTROAMPHETAMINE SULFATE AND AMPHETAMINE SULFATE 5; 5; 5; 5 MG/1; MG/1; MG/1; MG/1
20 TABLET ORAL 2 TIMES DAILY
Qty: 60 TABLET | Refills: 0 | Status: SHIPPED | OUTPATIENT
Start: 2020-05-04 | End: 2020-06-04 | Stop reason: SDUPTHER

## 2020-06-04 ENCOUNTER — VIRTUAL VISIT (OUTPATIENT)
Dept: INTERNAL MEDICINE CLINIC | Age: 25
End: 2020-06-04

## 2020-06-04 ENCOUNTER — TELEPHONE (OUTPATIENT)
Dept: INTERNAL MEDICINE CLINIC | Age: 25
End: 2020-06-04

## 2020-06-04 PROCEDURE — 99211 OFF/OP EST MAY X REQ PHY/QHP: CPT | Performed by: NURSE PRACTITIONER

## 2020-06-04 RX ORDER — DEXTROAMPHETAMINE SACCHARATE, AMPHETAMINE ASPARTATE, DEXTROAMPHETAMINE SULFATE AND AMPHETAMINE SULFATE 5; 5; 5; 5 MG/1; MG/1; MG/1; MG/1
20 TABLET ORAL 2 TIMES DAILY
Qty: 60 TABLET | Refills: 0 | Status: SHIPPED | OUTPATIENT
Start: 2020-06-04 | End: 2020-09-02 | Stop reason: SDUPTHER

## 2020-06-04 RX ORDER — DEXTROAMPHETAMINE SACCHARATE, AMPHETAMINE ASPARTATE, DEXTROAMPHETAMINE SULFATE AND AMPHETAMINE SULFATE 5; 5; 5; 5 MG/1; MG/1; MG/1; MG/1
20 TABLET ORAL 2 TIMES DAILY
Qty: 60 TABLET | Refills: 0 | Status: SHIPPED | OUTPATIENT
Start: 2020-08-03 | End: 2020-09-02 | Stop reason: SDUPTHER

## 2020-06-04 RX ORDER — DEXTROAMPHETAMINE SACCHARATE, AMPHETAMINE ASPARTATE, DEXTROAMPHETAMINE SULFATE AND AMPHETAMINE SULFATE 5; 5; 5; 5 MG/1; MG/1; MG/1; MG/1
20 TABLET ORAL 2 TIMES DAILY
Qty: 60 TABLET | Refills: 0 | Status: SHIPPED | OUTPATIENT
Start: 2020-07-03 | End: 2020-09-02 | Stop reason: SDUPTHER

## 2020-06-04 ASSESSMENT — ENCOUNTER SYMPTOMS
ABDOMINAL PAIN: 0
SHORTNESS OF BREATH: 0
CONSTIPATION: 0
CHEST TIGHTNESS: 0
DIARRHEA: 0

## 2020-06-04 NOTE — TELEPHONE ENCOUNTER
Pt is requesting a letter to be emailed over to him excusing him from work due to the fact that his belly is currently still hurting following their VV. [email or web address]

## 2020-06-04 NOTE — TELEPHONE ENCOUNTER
Patient needs a letter excusing him from work today and return tomorrow. 6/4 SD This message will be forward to Cara Washington for completion.  6/4 SD @ 4:15 pm

## 2020-06-05 NOTE — TELEPHONE ENCOUNTER
The letter for the patient was printed for Epic.patient was called to tell him that his letter is ready and that we need a fax number to fax this to but his VM was not set up yet. Will try again later.

## 2020-09-02 ENCOUNTER — VIRTUAL VISIT (OUTPATIENT)
Dept: INTERNAL MEDICINE CLINIC | Age: 25
End: 2020-09-02

## 2020-09-02 PROCEDURE — 99212 OFFICE O/P EST SF 10 MIN: CPT | Performed by: NURSE PRACTITIONER

## 2020-09-02 RX ORDER — DEXTROAMPHETAMINE SACCHARATE, AMPHETAMINE ASPARTATE, DEXTROAMPHETAMINE SULFATE AND AMPHETAMINE SULFATE 5; 5; 5; 5 MG/1; MG/1; MG/1; MG/1
20 TABLET ORAL 2 TIMES DAILY
Qty: 60 TABLET | Refills: 0 | Status: SHIPPED | OUTPATIENT
Start: 2020-11-01 | End: 2020-12-07 | Stop reason: SDUPTHER

## 2020-09-02 RX ORDER — DEXTROAMPHETAMINE SACCHARATE, AMPHETAMINE ASPARTATE, DEXTROAMPHETAMINE SULFATE AND AMPHETAMINE SULFATE 5; 5; 5; 5 MG/1; MG/1; MG/1; MG/1
20 TABLET ORAL 2 TIMES DAILY
Qty: 60 TABLET | Refills: 0 | Status: SHIPPED | OUTPATIENT
Start: 2020-09-02 | End: 2020-12-08 | Stop reason: SDUPTHER

## 2020-09-02 RX ORDER — DEXTROAMPHETAMINE SACCHARATE, AMPHETAMINE ASPARTATE, DEXTROAMPHETAMINE SULFATE AND AMPHETAMINE SULFATE 5; 5; 5; 5 MG/1; MG/1; MG/1; MG/1
20 TABLET ORAL 2 TIMES DAILY
Qty: 60 TABLET | Refills: 0 | Status: SHIPPED | OUTPATIENT
Start: 2020-10-01 | End: 2021-01-14

## 2020-09-02 ASSESSMENT — ENCOUNTER SYMPTOMS
SHORTNESS OF BREATH: 0
CONSTIPATION: 0
ABDOMINAL PAIN: 0
CHEST TIGHTNESS: 0
DIARRHEA: 0

## 2020-09-02 NOTE — PROGRESS NOTES
[address] [facility]  [address]  [address] [facility] [place] [address]  Dept: [phone]       2020   Due to the COVID-19 pandemic restrictions on close contact interactions as recommended by CDC and health authorities, the patient's visit was conducted via telemedicine in lieu of a face to face visit. Patient verbally consented and agreed to proceed  Rosey Poon (:  1995)is a 30 y.o. male, here for evaluation of the following medical concerns: This is an established patient being seen today for ADHD. He is without complaints. HPI  ADD/ADHD:  Current treatment: Adderall- 20 mg twice daily, which has been effective. Residual symptoms: none. Medication side effects: None. Patient denies nausea, vomiting, abdominal pain, involuntary weight loss, palpitations, anxiety and headache. Lab Results   Component Value Date    WBC 7.3 2016    HGB 14.6 2016    HCT 43.6 2016    MCV 92.1 2016     2016     Lab Results   Component Value Date     2019    K 4.2 2019     2019    CO2 24 2019    BUN 12 2019    CREATININE 1.0 2019    GLUCOSE 136 (H) 2019    CALCIUM 9.7 2019    PROT 7.2 2019    LABALBU 5.2 (H) 2019    BILITOT 0.3 2019    ALKPHOS 62 2019    AST 20 2019    ALT 14 2019    LABGLOM >60 2019    GFRAA >60 2019    AGRATIO 2.6 (H) 2019    GLOB 2.0 2019       Review of Systems   Constitutional: Negative for activity change and fever. HENT: Negative for congestion. Eyes: Negative for visual disturbance. Respiratory: Negative for chest tightness and shortness of breath. Cardiovascular: Negative for chest pain, palpitations and leg swelling. Gastrointestinal: Negative for abdominal pain, constipation and diarrhea. Endocrine: Negative for polyuria. Genitourinary: Negative for dysuria.    Musculoskeletal: Negative for arthralgias and myalgias. Skin: Negative for rash. Neurological: Negative for dizziness, light-headedness and headaches. Psychiatric/Behavioral: Negative for agitation, decreased concentration and sleep disturbance. The patient is not nervous/anxious. Prior to Visit Medications    Medication Sig Taking? Authorizing Provider   amphetamine-dextroamphetamine (ADDERALL, 20MG,) 20 MG tablet Take 1 tablet by mouth 2 times daily for 30 days. HAMLET Townsend CNP   amphetamine-dextroamphetamine (ADDERALL, 20MG,) 20 MG tablet Take 1 tablet by mouth 2 times daily for 30 days. HAMLET Townsend CNP   amphetamine-dextroamphetamine (ADDERALL, 20MG,) 20 MG tablet Take 1 tablet by mouth 2 times daily for 30 days. HAMLET Townsend CNP        Social History     Tobacco Use    Smoking status: Former Smoker     Packs/day: 0.25     Years: 8.00     Pack years: 2.00     Types: Cigarettes     Start date: 2007     Last attempt to quit: 7/3/2019     Years since quittin.1    Smokeless tobacco: Never Used    Tobacco comment: using E cig   Substance Use Topics    Alcohol use: No     Alcohol/week: 0.0 standard drinks     Comment: rarely        There were no vitals filed for this visit. Estimated body mass index is 22.18 kg/m² as calculated from the following:    Height as of 3/5/20: 5' 7\" (1.702 m). Weight as of 3/5/20: 141 lb 9.6 oz (64.2 kg). Physical exam limited due to virtual visit  Physical Exam  Constitutional:       Appearance: Normal appearance. He is well-developed, well-groomed and normal weight. HENT:      Head: Normocephalic. Neurological:      Mental Status: He is alert and oriented to person, place, and time. Psychiatric:         Attention and Perception: Attention normal.         Mood and Affect: Mood normal.         Speech: Speech normal.         Behavior: Behavior normal. Behavior is cooperative. ASSESSMENT/PLAN:  1.  Attention deficit hyperactivity disorder (ADHD), predominantly inattentive type  -Continue current medications. - amphetamine-dextroamphetamine (ADDERALL, 20MG,) 20 MG tablet; Take 1 tablet by mouth 2 times daily for 30 days. Dispense: 60 tablet; Refill: 0  - amphetamine-dextroamphetamine (ADDERALL, 20MG,) 20 MG tablet; Take 1 tablet by mouth 2 times daily for 30 days. Dispense: 60 tablet; Refill: 0  - amphetamine-dextroamphetamine (ADDERALL, 20MG,) 20 MG tablet; Take 1 tablet by mouth 2 times daily for 30 days. Dispense: 60 tablet; Refill: 0      No follow-ups on file. Clare Huang is a 30 y.o. male being evaluated by a Virtual Visit (video visit) encounter to address concerns as mentioned above. A caregiver was present when appropriate. Due to this being a TeleHealth encounter (During [place]-77 public health emergency), evaluation of the following organ systems was limited: Vitals/Constitutional/EENT/Resp/CV/GI//MS/Neuro/Skin/Heme-Lymph-Imm. Pursuant to the emergency declaration under the [address] [place] [address] authority and the [employer] and [employer] Act, this Virtual Visit was conducted with patient's (and/or legal guardian's) consent, to reduce the patient's risk of exposure to COVID-19 and provide necessary medical care. The patient (and/or legal guardian) has also been advised to contact this office for worsening conditions or problems, and seek emergency medical treatment and/or call 911 if deemed necessary. Patient identification was verified at the start of the visit: Yes    Total time spent for this encounter: Not billed by time    Services were provided through a video synchronous discussion virtually to substitute for in-person clinic visit. Patient and provider were located at their individual homes. --HAMLET Celis CNP on 9/2/2020 at 3:58 PM    An electronic signature was used to authenticate this note.

## 2020-12-07 RX ORDER — DEXTROAMPHETAMINE SACCHARATE, AMPHETAMINE ASPARTATE, DEXTROAMPHETAMINE SULFATE AND AMPHETAMINE SULFATE 5; 5; 5; 5 MG/1; MG/1; MG/1; MG/1
20 TABLET ORAL 2 TIMES DAILY
Qty: 60 TABLET | Refills: 0 | Status: CANCELLED | OUTPATIENT
Start: 2020-12-07 | End: 2021-01-06

## 2020-12-07 RX ORDER — DEXTROAMPHETAMINE SACCHARATE, AMPHETAMINE ASPARTATE, DEXTROAMPHETAMINE SULFATE AND AMPHETAMINE SULFATE 5; 5; 5; 5 MG/1; MG/1; MG/1; MG/1
20 TABLET ORAL 2 TIMES DAILY
Qty: 60 TABLET | Refills: 0 | Status: SHIPPED
Start: 2020-12-07 | End: 2020-12-08 | Stop reason: SDUPTHER

## 2020-12-08 RX ORDER — DEXTROAMPHETAMINE SACCHARATE, AMPHETAMINE ASPARTATE, DEXTROAMPHETAMINE SULFATE AND AMPHETAMINE SULFATE 5; 5; 5; 5 MG/1; MG/1; MG/1; MG/1
20 TABLET ORAL 2 TIMES DAILY
Qty: 60 TABLET | Refills: 0 | Status: SHIPPED | OUTPATIENT
Start: 2020-12-08 | End: 2021-01-14

## 2020-12-15 ENCOUNTER — TELEPHONE (OUTPATIENT)
Dept: PRIMARY CARE CLINIC | Age: 25
End: 2020-12-15

## 2020-12-15 NOTE — TELEPHONE ENCOUNTER
Pt says he was having symptoms of covid so he self quarantined and now his job is saying they need something saying that he was not feeling well so he can get paid for the time off I did let pt know there might not be anything that can be done with out a Positive test to show that he had covid  pt says he would like a call back to explain situation to Yasmin Alcazar or her [phone]

## 2020-12-15 NOTE — TELEPHONE ENCOUNTER
Larry Campos called the office back. No covid test completed, mother tested positive and he had symptoms after. VV set up with Brenda Egan on 12/16/2020.

## 2020-12-16 ENCOUNTER — VIRTUAL VISIT (OUTPATIENT)
Dept: PRIMARY CARE CLINIC | Age: 25
End: 2020-12-16
Payer: COMMERCIAL

## 2020-12-16 PROCEDURE — 99212 OFFICE O/P EST SF 10 MIN: CPT | Performed by: NURSE PRACTITIONER

## 2020-12-16 ASSESSMENT — PATIENT HEALTH QUESTIONNAIRE - PHQ9
SUM OF ALL RESPONSES TO PHQ QUESTIONS 1-9: 0
1. LITTLE INTEREST OR PLEASURE IN DOING THINGS: 0
SUM OF ALL RESPONSES TO PHQ QUESTIONS 1-9: 0
2. FEELING DOWN, DEPRESSED OR HOPELESS: 0
SUM OF ALL RESPONSES TO PHQ9 QUESTIONS 1 & 2: 0
SUM OF ALL RESPONSES TO PHQ QUESTIONS 1-9: 0

## 2020-12-16 ASSESSMENT — ENCOUNTER SYMPTOMS
CHEST TIGHTNESS: 0
APNEA: 0
COUGH: 0
SHORTNESS OF BREATH: 0

## 2020-12-16 ASSESSMENT — VISUAL ACUITY: OU: 1

## 2020-12-16 NOTE — LETTER
[facility]  [address] [address]  Phone: [phone]  Fax: [phone]    HAMLET Albert CNP        December 16, 2020     Patient: Megan Rust   YOB: 1995   Date of Visit: 12/16/2020           To Whom It May Concern:    Ольга Sinha was seen in my clinic. He was in close contact with a family member whom was tested for Covid on 12/3, notified of positive result on December 4th. He was required to quarantine for 14 days. He may return to work without restrictions on December 21, 2020. If you have any questions or concerns, please don't hesitate to call.           Sincerely,            HAMLET Albert CNP

## 2020-12-16 NOTE — PROGRESS NOTES
amphetamine-dextroamphetamine (ADDERALL, 20MG,) 20 MG tablet Take 1 tablet by mouth 2 times daily for 30 days. Need appt before next refill  Mckenna Collinsalsulema, APRN - CNP   amphetamine-dextroamphetamine (ADDERALL, 20MG,) 20 MG tablet Take 1 tablet by mouth 2 times daily for 30 days. Mckenna Mccatry APRASHLEY - CNP        Social History     Tobacco Use    Smoking status: Former Smoker     Packs/day: 0.25     Years: 8.00     Pack years: 2.00     Types: Cigarettes     Start date: 2007     Quit date: 7/3/2019     Years since quittin.4    Smokeless tobacco: Never Used    Tobacco comment: using E cig   Substance Use Topics    Alcohol use: No     Alcohol/week: 0.0 standard drinks     Comment: rarely        There were no vitals filed for this visit. Estimated body mass index is 22.18 kg/m² as calculated from the following:    Height as of 3/5/20: 5' 7\" (1.702 m). Weight as of 3/5/20: 141 lb 9.6 oz (64.2 kg). Physical exam limited due to virtual visit. Physical Exam  Constitutional:       Appearance: Normal appearance. He is well-developed and well-groomed. HENT:      Head: Normocephalic. Right Ear: Hearing normal.      Left Ear: Hearing normal.   Eyes:      General: Lids are normal. Vision grossly intact. Pulmonary:      Effort: Pulmonary effort is normal. No accessory muscle usage or respiratory distress. Comments: Speaking in full sentences  Neurological:      General: No focal deficit present. Mental Status: He is alert and oriented to person, place, and time. Psychiatric:         Attention and Perception: Attention normal.         Mood and Affect: Mood normal.         Speech: Speech normal.         Behavior: Behavior is cooperative. Cognition and Memory: Cognition and memory normal.         ASSESSMENT/PLAN:  1. COVID-19 virus infection  -Increase fluid intake  -Will provide letter to employer      No follow-ups on file. Yunier Garcia is a 30 y.o. male being evaluated by a Virtual Visit (video visit) encounter to address concerns as mentioned above. A caregiver was present when appropriate. Due to this being a TeleHealth encounter ([facility]), evaluation of the following organ systems was limited: Vitals/Constitutional/EENT/Resp/CV/GI//MS/Neuro/Skin/Heme-Lymph-Imm. Pursuant to the emergency declaration under the [address] [place] [address] and the [employer] Act, this Virtual Visit was conducted with patient's (and/or legal guardian's) consent, to reduce the patient's risk of exposure to COVID-19 and provide necessary medical care. The patient (and/or legal guardian) has also been advised to contact this office for worsening conditions or problems, and seek emergency medical treatment and/or call 911 if deemed necessary. Patient identification was verified at the start of the visit: Yes    Total time spent for this encounter: Not billed by time    Services were provided through a video synchronous discussion virtually to substitute for in-person clinic visit. Patient and provider were located at their individual homes. An electronic signature was used to authenticate this note.     --HAMLET Brar - CNP on 12/16/2020 at 3:02 PM

## 2020-12-27 ENCOUNTER — HOSPITAL ENCOUNTER (EMERGENCY)
Age: 25
Discharge: HOME | End: 2020-12-27
Attending: EMERGENCY MEDICINE

## 2020-12-27 VITALS
TEMPERATURE: 97.7 F | DIASTOLIC BLOOD PRESSURE: 96 MMHG | BODY MASS INDEX: 22.76 KG/M2 | RESPIRATION RATE: 16 BRPM | HEIGHT: 67 IN | WEIGHT: 145 LBS | SYSTOLIC BLOOD PRESSURE: 155 MMHG | HEART RATE: 109 BPM | OXYGEN SATURATION: 100 %

## 2020-12-27 PROCEDURE — 93005 ELECTROCARDIOGRAM TRACING: CPT | Performed by: EMERGENCY MEDICINE

## 2020-12-27 NOTE — Clinical Note
Patient left without being seen after triage. Patient was called for a room three times with no answer.

## 2020-12-27 NOTE — ED NOTES
Patient states he has stopped taking the adderal as he doesn't like the way it makes him feel, he is substituting caffeine instead.      Daisy Red RN  12/27/20 9191

## 2020-12-28 LAB
EKG ATRIAL RATE: 116 BPM
EKG DIAGNOSIS: NORMAL
EKG P AXIS: 59 DEGREES
EKG P-R INTERVAL: 126 MS
EKG Q-T INTERVAL: 328 MS
EKG QRS DURATION: 94 MS
EKG QTC CALCULATION (BAZETT): 455 MS
EKG R AXIS: 89 DEGREES
EKG T AXIS: 52 DEGREES
EKG VENTRICULAR RATE: 116 BPM

## 2020-12-28 PROCEDURE — 93010 ELECTROCARDIOGRAM REPORT: CPT | Performed by: INTERNAL MEDICINE

## 2020-12-28 NOTE — ED PROVIDER NOTES
Patient left without receiving treatment. He was never roomed  EKG reveals sinus tachycardia with heart rate 116. Compared to June 2014 study this is similar. No QTC prolongation.   There is incomplete right bundle branch block unchanged     Be Kimble MD  12/27/20 2038

## 2021-01-14 ENCOUNTER — OFFICE VISIT (OUTPATIENT)
Dept: PRIMARY CARE CLINIC | Age: 26
End: 2021-01-14
Payer: COMMERCIAL

## 2021-01-14 VITALS
WEIGHT: 147.6 LBS | BODY MASS INDEX: 23.12 KG/M2 | SYSTOLIC BLOOD PRESSURE: 148 MMHG | OXYGEN SATURATION: 97 % | HEART RATE: 74 BPM | DIASTOLIC BLOOD PRESSURE: 107 MMHG | TEMPERATURE: 98.6 F

## 2021-01-14 DIAGNOSIS — F31.0 BIPOLAR AFFECTIVE DISORDER, CURRENT EPISODE HYPOMANIC (HCC): Primary | ICD-10-CM

## 2021-01-14 PROCEDURE — 99214 OFFICE O/P EST MOD 30 MIN: CPT | Performed by: INTERNAL MEDICINE

## 2021-01-14 NOTE — PROGRESS NOTES
2021    Wilton Jose (:  1995) is a 30 y.o. male, here for evaluation of the following medical concerns:    Chief Complaint   Patient presents with    Panic Attack     SOB heart racing seen in ER 2020 EKG done       HPI  49-year-old white male with chart history of depression with suicidal attempts that he describes as a tension getting, previously prescribed Prozac, ADHD previously prescribed Adderall, with family history of bipolar affective disorder and ADHD (mother) who presents with several month history of increasing anxiety, decreased sleep, nightmares revolving around death or threat of death to himself or loved ones. He describes his anxiety as panic attacks, associated with shortness of breath palpitations subjective tachycardia (recently in the ED with EKG demonstrating a sinus tachycardia) sweating nausea dry mouth. When he is not feeling panicky, he is worried about losing control in front of other people, and has socially isolated himself even more than required by Covid endemic. Denies intrusive imaging, during these spells. He denies witnessing or hearing of a loved 1 suffering a horrifically traumatic event. Not a combat . He describes worrying about number of things, such as his health his career finances his daughter the state of the [employer] climate change. Prosper equals 18. PHQ 15.    He endorses running up credit card debt, gambling, but not high-speed reckless driving, staying up days on end. Mood disorder questionnaire 7 yeses, some occurring at same time, causing moderate problem with his social roles. Urine drug screen negative except for Adderall in 2019, TSH normal at the time. Within the past month, using his victorina, he stopped smoking, drinking heavy caffeine, stop smoking marijuana. He weaned himself off Adderall. Denies alcohol abuse. Previously smoked a pack per day cigarettes, 2 g/day marijuana. Denies methamphetamine or cocaine use. Pulse: 74   Temp: 98.6 °F (37 °C)   TempSrc: Temporal   SpO2: 97%   Weight: 147 lb 9.6 oz (67 kg)     Estimated body mass index is 23.12 kg/m² as calculated from the following:    Height as of 12/27/20: 5' 7\" (1.702 m). Weight as of this encounter: 147 lb 9.6 oz (67 kg). PHYSICAL EXAM  GENERAL:  Pleasant  male who looks his stated age, awake alert and oriented x3, no acute distress. PSYCH: Moderate psychomotor agitation. Pressured speech. Good eye contact. Unrestricted affect range. Mood congruent with affect. Somewhat tangential thought. LABS  Lab Review   Admission on 12/27/2020, Discharged on 12/27/2020   Component Date Value    Ventricular Rate 12/27/2020 116     Atrial Rate 12/27/2020 116     P-R Interval 12/27/2020 126     QRS Duration 12/27/2020 94     Q-T Interval 12/27/2020 328     QTc Calculation (Bazett) 12/27/2020 455     P Axis 12/27/2020 59     R Axis 12/27/2020 89     T Axis 12/27/2020 52     Diagnosis 12/27/2020 Sinus tachycardiaIncomplete right bundle branch blockBorderline ECGConfirmed by Katherine Jackson MD, Abrahan Gomez ([id number]) on 12/28/2020 6:08:54 PM          ASSESSMENT/PLAN  1. Bipolar affective disorder, current episode hypomanic ([address]  Differential diagnosis includes panic disorder with agoraphobia, generalized anxiety disorder, decompensated depression with psychotic features. Somewhat confounding diagnosis is prior diagnosis of ADHD. What comes across as hypomania may in fact be ADHD. Yet, Patient indicates that Adderall worsened these pre-existing features of hyperactivity, when started on Adderall in 2018. These features have not recovered since stopping the Adderall a couple of weeks ago. I do not think that he is experiencing any withdrawal symptoms at this time, and discontinuing substances of abuse reduces the likelihood that his mood disorder is secondary to substance abuse of course.     Mother with bipolar affective disorder increases likelihood that patient has bipolar affective disorder significantly. If his \"panic attacks\" fail to respond to uptitrating Vraylar, 1.5 mg daily for 2 weeks increasing to 3 mg daily thereafter; samples for the low dose provided, would recommend trial of Effexor 37.5 mg for 2 weeks increasing to 75 mg daily thereafter. Referral to [facility] psychiatry nurse practitioner for counseling, further evaluation. - cariprazine hcl (VRAYLAR) 3 MG CAPS capsule; Take 1 capsule by mouth daily  Dispense: 30 capsule; Refill: 1      No follow-ups on file. It was a pleasure to visit with Mr. Jose Johnson today. Answered all questions as best I could. Nithya Madrigal MD   Time 36 minutes, 20 minutes care coordination counseling.

## 2021-01-14 NOTE — PATIENT INSTRUCTIONS
1. Psychiatry counseling, med consultation arranged. Should call you. 2. Start vraylar 1.5mg daily for 14 days, then increase to 3mg daily  Samples for 1.5mg given, rx sent for 3mg.

## 2021-01-19 ENCOUNTER — HOSPITAL ENCOUNTER (EMERGENCY)
Age: 26
Discharge: HOME | End: 2021-01-19
Attending: EMERGENCY MEDICINE
Payer: COMMERCIAL

## 2021-01-19 ENCOUNTER — TELEPHONE (OUTPATIENT)
Dept: PRIMARY CARE CLINIC | Age: 26
End: 2021-01-19

## 2021-01-19 ENCOUNTER — TELEPHONE (OUTPATIENT)
Dept: OTHER | Facility: CLINIC | Age: 26
End: 2021-01-19

## 2021-01-19 ENCOUNTER — NURSE TRIAGE (OUTPATIENT)
Dept: OTHER | Facility: CLINIC | Age: 26
End: 2021-01-19

## 2021-01-19 ENCOUNTER — APPOINTMENT (OUTPATIENT)
Dept: GENERAL RADIOLOGY | Age: 26
End: 2021-01-19
Payer: COMMERCIAL

## 2021-01-19 VITALS
BODY MASS INDEX: 23.18 KG/M2 | OXYGEN SATURATION: 99 % | RESPIRATION RATE: 17 BRPM | TEMPERATURE: 97.7 F | DIASTOLIC BLOOD PRESSURE: 89 MMHG | HEIGHT: 67 IN | HEART RATE: 97 BPM | SYSTOLIC BLOOD PRESSURE: 140 MMHG | WEIGHT: 147.71 LBS

## 2021-01-19 DIAGNOSIS — F40.01 PANIC DISORDER WITH AGORAPHOBIA: ICD-10-CM

## 2021-01-19 DIAGNOSIS — R00.2 PALPITATIONS: Primary | ICD-10-CM

## 2021-01-19 LAB
ANION GAP SERPL CALCULATED.3IONS-SCNC: 13 MMOL/L (ref 3–16)
BASOPHILS ABSOLUTE: 0.1 K/UL (ref 0–0.2)
BASOPHILS RELATIVE PERCENT: 0.9 %
BUN BLDV-MCNC: 20 MG/DL (ref 7–20)
CALCIUM SERPL-MCNC: 10.2 MG/DL (ref 8.3–10.6)
CHLORIDE BLD-SCNC: 95 MMOL/L (ref 99–110)
CO2: 26 MMOL/L (ref 21–32)
CREAT SERPL-MCNC: 0.8 MG/DL (ref 0.9–1.3)
D DIMER: <200 NG/ML DDU (ref 0–229)
EOSINOPHILS ABSOLUTE: 0.1 K/UL (ref 0–0.6)
EOSINOPHILS RELATIVE PERCENT: 1.2 %
GFR AFRICAN AMERICAN: >60
GFR NON-AFRICAN AMERICAN: >60
GLUCOSE BLD-MCNC: 89 MG/DL (ref 70–99)
HCT VFR BLD CALC: 47.7 % (ref 40.5–52.5)
HEMOGLOBIN: 16.2 G/DL (ref 13.5–17.5)
LYMPHOCYTES ABSOLUTE: 3.1 K/UL (ref 1–5.1)
LYMPHOCYTES RELATIVE PERCENT: 31.5 %
MCH RBC QN AUTO: 30.3 PG (ref 26–34)
MCHC RBC AUTO-ENTMCNC: 34 G/DL (ref 31–36)
MCV RBC AUTO: 89.3 FL (ref 80–100)
MONOCYTES ABSOLUTE: 1.1 K/UL (ref 0–1.3)
MONOCYTES RELATIVE PERCENT: 11.1 %
NEUTROPHILS ABSOLUTE: 5.4 K/UL (ref 1.7–7.7)
NEUTROPHILS RELATIVE PERCENT: 55.3 %
PDW BLD-RTO: 13.7 % (ref 12.4–15.4)
PLATELET # BLD: 309 K/UL (ref 135–450)
PMV BLD AUTO: 8.4 FL (ref 5–10.5)
POTASSIUM REFLEX MAGNESIUM: 4.5 MMOL/L (ref 3.5–5.1)
RBC # BLD: 5.34 M/UL (ref 4.2–5.9)
SODIUM BLD-SCNC: 134 MMOL/L (ref 136–145)
TROPONIN: <0.01 NG/ML
WBC # BLD: 9.7 K/UL (ref 4–11)

## 2021-01-19 PROCEDURE — 85025 COMPLETE CBC W/AUTO DIFF WBC: CPT

## 2021-01-19 PROCEDURE — 71046 X-RAY EXAM CHEST 2 VIEWS: CPT

## 2021-01-19 PROCEDURE — 84484 ASSAY OF TROPONIN QUANT: CPT

## 2021-01-19 PROCEDURE — 93005 ELECTROCARDIOGRAM TRACING: CPT | Performed by: EMERGENCY MEDICINE

## 2021-01-19 PROCEDURE — 85379 FIBRIN DEGRADATION QUANT: CPT

## 2021-01-19 PROCEDURE — 80048 BASIC METABOLIC PNL TOTAL CA: CPT

## 2021-01-19 PROCEDURE — 99283 EMERGENCY DEPT VISIT LOW MDM: CPT

## 2021-01-19 ASSESSMENT — PAIN DESCRIPTION - DESCRIPTORS: DESCRIPTORS: DULL

## 2021-01-19 ASSESSMENT — PAIN DESCRIPTION - PAIN TYPE: TYPE: ACUTE PAIN

## 2021-01-19 NOTE — ED PROVIDER NOTES
[address] [facility]      Pt Name: Antonella Segura  MRN: [id number]  [place] 1995  Date of evaluation: 1/19/2021  Provider: Damon [facility]       Chief Complaint   Patient presents with    Palpitations     x couple weeks, worse with exercise. hx of anxiety and palpitations. HISTORY OF PRESENT ILLNESS   (Location/Symptom, Timing/Onset, Context/Setting, Quality, Duration, Modifying Factors, Severity)  Note limiting factors. Antonella Segura is a 30 y.o. male who presents to the emergency department with a complaint of palpitations. He states that periodically he feels anxious and noticed that his heart is beating fast.  He denies any associated syncope, near syncope of visual changes, weakness or numbness. He denies any associated chest pain, heaviness pressure tightness or shortness of breath. This last occurred today while he was skateboarding. In an effort to reduce his palpitations, he did some reading online and discontinued caffeine, marijuana, and cigarettes. He suspected that they could be causing his palpitations. However, he reports no improvement. He does say that he feels \"healthier\". Patient does have a history of ADHD and approximately 6 weeks ago he stopped taking his ADHD thinking that it was contributing to his symptoms. He has noticed intermittent palpitations over the last 2 to 3 years. He always attributed it to anxiety. He states that he has been diagnosed with anxiety in the past.  He recently saw a primary care physician and was prescribed Vraylar approximately 6 days ago. He does not feel that the medication has helped and does not necessarily feel that it has made it worse. He was also given a referral to see a counselor for his anxiety and has his first appointment on February 13.     He does report one remote episode of syncope 6 years ago but states that he was a drug user at the time and attributed to substance abuse. No further episodes of syncope. No family history of syncope or arrhythmia. He denies any leg or calf pain. No recent travel. No history of thromboembolic disease. Nursing Notes were reviewed. HPI        REVIEW OF SYSTEMS    (2-9 systems for level 4, 10 or more for level 5)       Constitutional: Negative for fever or chills. HENT: Negative for rhinorrhea and sore throat. Eyes: Negative for redness or drainage. Respiratory: Negative for shortness of breath or dyspnea on exertion. Cardiovascular: Negative for chest pain. Gastrointestinal: Negative for abdominal pain. Negative for vomiting or diarrhea. Genitourinary: Negative for flank pain. Negative for dysuria. Negative for hematuria. Neurological: Negative for headache. Musculoskeletal:  Negative edema. Hematological: Negative for adenopathy. All systems are reviewed and are negative except for those listed above in the history of present illness and ROS. PAST MEDICAL HISTORY     Past Medical History:   Diagnosis Date    Anxiety     Bronchitis     Depression     Insomnia     Panic attacks     Pneumonia 2009    Spondylosis          SURGICAL HISTORY       Past Surgical History:   Procedure Laterality Date    APPENDECTOMY     Kevon Ferguson Left     Dr. Harsh Fontaine.  Medial meniscus tear    WISDOM TOOTH EXTRACTION           CURRENT MEDICATIONS       Previous Medications    CARIPRAZINE HCL (VRAYLAR) 3 MG CAPS CAPSULE    Take 1 capsule by mouth daily       ALLERGIES     Tramadol-acetaminophen    FAMILY HISTORY       Family History   Problem Relation Age of Onset    Depression Mother     Liver Disease Mother         WRIGHT    Depression Sister     High Blood Pressure Other           SOCIAL HISTORY       Social History     Socioeconomic History    Marital status: Single     Spouse name: Not on file    Number of children: 1    Years of education: Not on file    Highest education level: GED or equivalent   Occupational History    Occupation: unemployed   Social Needs    Financial resource strain: Not on file    Food insecurity     Worry: Never true     Inability: Never true   [employer] needs     Medical: No     Non-medical: No   Tobacco Use    Smoking status: Current Some Day Smoker     Packs/day: 0.25     Years: 8.00     Pack years: 2.00     Types: Cigarettes     Start date: 2007     Last attempt to quit: 7/3/2019     Years since quittin.5    Smokeless tobacco: Never Used    Tobacco comment: using E cig   Substance and Sexual Activity    Alcohol use: No     Alcohol/week: 0.0 standard drinks     Comment: occ    Drug use: Not Currently     Types: Marijuana     Comment: last use of marijuana and pain pill 3 years ago    Sexual activity: Yes     Partners: Female   Lifestyle    Physical activity     Days per week: Not on file     Minutes per session: Not on file    Stress: Not on file   Relationships    Social connections     Talks on phone: Not on file     Gets together: Not on file     Attends Holiness service: Not on file     Active member of club or organization: Not on file     Attends meetings of clubs or organizations: Not on file     Relationship status: Not on file    Intimate partner violence     Fear of current or ex partner: No     Emotionally abused: No     Physically abused: No     Forced sexual activity: No   Other Topics Concern    Not on file   Social History Narrative    Lives with significant other and daughter       SCREENINGS             PHYSICAL EXAM    (up to 7 for level 4, 8 or more for level 5)     ED Triage Vitals [21 1118]   BP Temp Temp Source Pulse Resp SpO2 Height Weight   (!) 141/99 97.5 °F (36.4 °C) Oral 113 16 98 % 5' 7\" (1.702 m) 147 lb 11.3 oz (67 kg)         Physical Exam   Constitutional: Awake and alert. Pleasant. Appears comfortable. Head: No visible evidence of trauma. Normocephalic. Eyes: Pupils equal and reactive. No photophobia. Conjunctiva normal.    HENT: Oral mucosa moist.  Airway patent. Pharynx without erythema. Nares were clear. Neck:  Soft and supple. Nontender. Heart:  Regular rate and rhythm. No murmur. Sinus rhythm on the monitor. Rate 95. Lungs:  Clear to auscultation. No wheezes, rales, or ronchi. No conversational dyspnea or accessory muscle use. Chest: Chest wall non-tender. No evidence of trauma. Abdomen:  Soft, nondistended, bowel sounds present. Nontender. No guarding rigidity or rebound. No masses. Musculoskeletal: Extremities non-tender with full range of motion. Radial and dorsalis pedis pulses were intact. No calf tenderness erythema or edema. Neurological: Alert and oriented x 3. Speech clear. Cranial nerves II-XII intact. No facial droop. No acute focal motor or sensory deficits. Skin: Skin is warm and dry. No rash. Lymphatic:  No lympadenopathy. Psychiatric: Normal mood and affect. Behavior is normal.  No suicidal or homicidal ideations. No auditory or visual hallucinations. Appears calm and pleasant. DIAGNOSTIC RESULTS     EKG: All EKG's are interpreted by the Emergency Department Physician who either signs or Co-signs this chart in the absence of a cardiologist.    Sinus tachycardia. Rate 113. DE interval 116 ms. QRS duration 90 ms. QTc 416 ms.  R axis 92 degrees. No ST elevation. No acute change. RADIOLOGY:   Non-plain film images such as CT, Ultrasound and MRI are read by the radiologist. Plain radiographic images are visualized and preliminarily interpreted by the emergency physician with the below findings:        Interpretation per the Radiologist below, if available at the time of this note:    XR CHEST (2 VW)   Final Result   Normal chest.  No acute abnormality. No change from the prior study.                ED BEDSIDE ULTRASOUND:   Performed by ED Physician - none    LABS:  Labs Reviewed   BASIC METABOLIC PANEL W/ REFLEX TO MG FOR LOW K - Abnormal; Notable for the following components:       Result Value    Sodium 134 (*)     Chloride 95 (*)     CREATININE 0.8 (*)     All other components within normal limits    Narrative:     Performed at:  [facility]  [address] [place], [place] [address]   Phone [phone]   CBC WITH AUTO DIFFERENTIAL    Narrative:     Performed at:  [facility]  [address] [place], [place] [address]   Phone [phone]   TROPONIN    Narrative:     Performed at:  [facility]  [address] [facility] [address]   Phone [phone]   D-DIMER, QUANTITATIVE    Narrative:     Performed at:  [address] [address]   Phone [phone]       All other labs were within normal range or not returned as of this dictation. EMERGENCY DEPARTMENT COURSE and DIFFERENTIAL DIAGNOSIS/MDM:   Vitals:    Vitals:    01/19/21 1118   BP: (!) 141/99   Pulse: 113   Resp: 16   Temp: 97.5 °F (36.4 °C)   TempSrc: Oral   SpO2: 98%   Weight: 147 lb 11.3 oz (67 kg)   Height: 5' 7\" (1.702 m)         MDM      Patient presents with palpitations that have been occurring intermittently over the last 2 to 3 years, worsening over the last 6 months. He has already discontinued caffeine, Adderall, and smoking but is still having periods where he feels that his heart is beating faster than it should. From conversation with the patient it does not appear that he has had any heart rates greater than 150. Most of everything that he has checked has been in the 120s and 130s. Therefore I suspect that his symptoms may be secondary to anxiety. He does report a significant history of anxiety, usually associated with being in crowds and around people. He states that his symptoms are usually worse when he is standing in line at a grocery store for example.   I advised him to continue his current medication and follow-up with the counselor as scheduled. He is stable for discharge and outpatient management. No suicidal or homicidal ideations. No auditory or visual hallucinations. Advised him to continue to avoid caffeine, tobacco use, and any other stimulants. EKG revealed sinus rhythm. Laboratory studies were obtained. In addition I recommended further evaluation with 2D echocardiogram and 24-hour Holter monitor. Clinical suspicion for SVT is low but would be included in the differential diagnosis. REASSESSMENT          12:37 PM: Laboratory studies were reviewed. Troponin is normal.  D-dimer is normal.  Chest x-ray is unremarkable. Electrolytes are normal.    I advised him to follow-up with his primary care physician in 1 to 2 days for reexamination and to schedule outpatient 2D echocardiogram and 24-hour Holter monitor. If condition worsens or new symptoms develop, return immediately to the emergency department. CRITICAL CARE TIME   Total Critical Care time was 0 minutes, excluding separately reportable procedures. There was a high probability of clinically significant/life threatening deterioration in the patient's condition which required my urgent intervention. CONSULTS:  None    PROCEDURES:  Unless otherwise noted below, none     Procedures        FINAL IMPRESSION      1. Palpitations    2. Panic disorder with agoraphobia          DISPOSITION/PLAN   DISPOSITION        PATIENT REFERRED TO:  HAMLET Barrios CNP  [address]  [phone]    Call today        DISCHARGE MEDICATIONS:  New Prescriptions    No medications on file     Controlled Substances Monitoring:     RX Monitoring 10/11/2019   Attestation -   Periodic Controlled Substance Monitoring Possible medication side effects, risk of tolerance/dependence & alternative treatments discussed.        (Please note that portions of this note were completed with a voice recognition program.  Efforts were made to edit the dictations but occasionally words are mis-transcribed. )    [id number] Roland Davis DO (electronically signed)  Attending Emergency Physician          Radha Fung DO  01/19/21 3122

## 2021-01-20 ENCOUNTER — CARE COORDINATION (OUTPATIENT)
Dept: CARE COORDINATION | Age: 26
End: 2021-01-20

## 2021-01-20 ENCOUNTER — TELEPHONE (OUTPATIENT)
Dept: PRIMARY CARE CLINIC | Age: 26
End: 2021-01-20

## 2021-01-20 LAB
EKG ATRIAL RATE: 113 BPM
EKG DIAGNOSIS: NORMAL
EKG P AXIS: 57 DEGREES
EKG P-R INTERVAL: 116 MS
EKG Q-T INTERVAL: 304 MS
EKG QRS DURATION: 90 MS
EKG QTC CALCULATION (BAZETT): 416 MS
EKG R AXIS: 92 DEGREES
EKG T AXIS: 52 DEGREES
EKG VENTRICULAR RATE: 113 BPM

## 2021-01-20 PROCEDURE — 93010 ELECTROCARDIOGRAM REPORT: CPT | Performed by: INTERNAL MEDICINE

## 2021-01-20 NOTE — TELEPHONE ENCOUNTER
NOTE:  Pt was scheduled an appointment with Dr. Yvonne Smith on 1/21. He can take care of the tests needed then. Scheduling called and said pt was in ER and they told him to have his physician order a test but he was unsure what the test was. I checked ER notes and they said he needs Pt needs to see primary care and have them order 2D echo and 24-hour Holter monitor. Scheduling said to just put in Epic and have pt call them to schedule.

## 2021-01-20 NOTE — CARE COORDINATION
Patient contacted regarding recent discharge and COVID-19 risk. Discussed COVID-19 related testing which was not done at this time. Test results were not done. Patient informed of results, if available? N/A     Care Transition Nurse/ Ambulatory Care Manager contacted the patient by telephone to perform post discharge assessment. Verified name and  with patient as identifiers. Patient has following risk factors of: no known risk factors. CTN/ACM reviewed discharge instructions, medical action plan and red flags related to discharge diagnosis. Reviewed and educated them on any new and changed medications related to discharge diagnosis. Advised obtaining a 90-day supply of all daily and as-needed medications. Patient seen in the ED for Palpitations. Patient says he attributes his palpitations to Panic Disorder. Patient has an appointment with his PCP tomorrow. Patient is diagnosed with Generalized Anxiety Disorder, Depression, and  Mood Disorder. Education provided regarding infection prevention, and signs and symptoms of COVID-19 and when to seek medical attention with patient who verbalized understanding. Discussed exposure protocols and quarantine from [address] you at higher risk for severe illness  and given an opportunity for questions and concerns. The patient agrees to contact the COVID-19 hotline [phone] or PCP office for questions related to their healthcare. CTN/ACM provided contact information for future reference. From CDC: Are you at higher risk for severe illness?  Wash your hands often.  Avoid close contact (6 feet, which is about two arm lengths) with people who are sick.  Put distance between yourself and other people if COVID-19 is spreading in your community.  Clean and disinfect frequently touched surfaces.  Avoid all cruise travel and non-essential air travel.    Call your healthcare professional if you have concerns about COVID-19 and your underlying condition or if you are sick. For more information on steps you can take to protect yourself, see CDC's How to [address] [facility] for follow-up call in 7-14 days based on severity of symptoms and risk factors.

## 2021-01-21 ENCOUNTER — OFFICE VISIT (OUTPATIENT)
Dept: PRIMARY CARE CLINIC | Age: 26
End: 2021-01-21
Payer: COMMERCIAL

## 2021-01-21 VITALS
DIASTOLIC BLOOD PRESSURE: 90 MMHG | BODY MASS INDEX: 23.34 KG/M2 | TEMPERATURE: 99 F | SYSTOLIC BLOOD PRESSURE: 175 MMHG | WEIGHT: 149 LBS | RESPIRATION RATE: 18 BRPM | HEART RATE: 116 BPM

## 2021-01-21 DIAGNOSIS — R00.2 PALPITATIONS: Primary | ICD-10-CM

## 2021-01-21 DIAGNOSIS — F41.9 ANXIETY: ICD-10-CM

## 2021-01-21 PROCEDURE — 90471 IMMUNIZATION ADMIN: CPT | Performed by: INTERNAL MEDICINE

## 2021-01-21 PROCEDURE — 90686 IIV4 VACC NO PRSV 0.5 ML IM: CPT | Performed by: INTERNAL MEDICINE

## 2021-01-21 PROCEDURE — 99213 OFFICE O/P EST LOW 20 MIN: CPT | Performed by: INTERNAL MEDICINE

## 2021-01-21 PROCEDURE — 1111F DSCHRG MED/CURRENT MED MERGE: CPT | Performed by: INTERNAL MEDICINE

## 2021-01-21 PROCEDURE — 4004F PT TOBACCO SCREEN RCVD TLK: CPT | Performed by: INTERNAL MEDICINE

## 2021-01-21 PROCEDURE — G8420 CALC BMI NORM PARAMETERS: HCPCS | Performed by: INTERNAL MEDICINE

## 2021-01-21 PROCEDURE — G8427 DOCREV CUR MEDS BY ELIG CLIN: HCPCS | Performed by: INTERNAL MEDICINE

## 2021-01-21 PROCEDURE — G8482 FLU IMMUNIZE ORDER/ADMIN: HCPCS | Performed by: INTERNAL MEDICINE

## 2021-01-21 RX ORDER — CLONAZEPAM 1 MG/1
1 TABLET ORAL 2 TIMES DAILY PRN
Qty: 28 TABLET | Refills: 0 | Status: SHIPPED | OUTPATIENT
Start: 2021-01-21 | End: 2021-02-01 | Stop reason: SDUPTHER

## 2021-01-21 NOTE — PATIENT INSTRUCTIONS
1. Mychart how's it going panic attack wise in a week  2. Start/continue 3mg vraylar samples for 2 weeks provided. 3. Holter echo ordered  4.  Bridging klonopin

## 2021-01-21 NOTE — PROGRESS NOTES
2021    Jolene Cedeno (:  1995) is a 30 y.o. male, here for evaluation of the following medical concerns:    No chief complaint on file. HPI  47-year-old white male with chart history of depression with suicidal attempts that he describes as a tension getting, previously prescribed Prozac, ADHD previously prescribed Adderall, with family history of bipolar affective disorder and ADHD (mother) who simply presented with several month history of increasing anxiety, decreased sleep, nightmares revolving around death or threat of death to himself or loved ones. He described his anxiety as panic attacks, associated with shortness of breath palpitations subjective tachycardia (recently in the ED with EKG demonstrating a sinus tachycardia) sweating nausea dry mouth. When he is not feeling panicky, he is worried about losing control in front of other people, and has socially isolated himself even more than required by Covid endemic. Denies intrusive imaging, during these spells. He denies witnessing or hearing of a loved 1 suffering a  traumatic event. Refer the interested reader to my previous note, clinical impression of bipolar affective disorder type 2 prompting trial of Vraylar with referral to psychiatry and cosideration of Effexor if ineffective. Shortly after starting Vraylar 1.5 mg, the patient was back in the ER again with palpitations, and increased the Vraylar to 3 mg on his own. Denies drug abuse relapse. Is worried that there is something seriously wrong with his heart. He also wonders if his spine spondylolisthesis is contributing to some of his psychiatric symptoms. Review of Systems   Constitutional: Negative for activity change, appetite change, fatigue and unexpected weight change. HENT: Negative for dental problem, sinus pain, sore throat and trouble swallowing. Eyes: Negative for pain and visual disturbance.    Respiratory: Negative for apnea, cough, chest tightness, shortness of breath and wheezing. Cardiovascular: Negative for chest pain positive for palpitations. Gastrointestinal: Negative for abdominal pain, blood in stool, constipation, diarrhea, nausea, rectal pain and vomiting. Endocrine: Negative for cold intolerance, heat intolerance, polydipsia, polyphagia and polyuria. Genitourinary: Negative for difficulty urinating, dysuria, flank pain, frequency, hematuria, pelvic pain, urgency, vaginal bleeding and vaginal discharge. Musculoskeletal: Negative for arthralgias, back pain, gait problem, joint swelling, myalgias, neck pain and neck stiffness. Skin: Negative for color change and rash. Neurological: Negative for dizziness, tremors, syncope, speech difficulty, weakness, light-headedness and headaches. Hematological: Negative for adenopathy. Does not bruise/bleed easily. Psychiatric/Behavioral: Positive for agitation, behavioral problems, decreased concentration, sleep disturbance. The patient is nervous/anxious and is  hyperactive. Prior to Visit Medications    Medication Sig Taking? Authorizing Provider   cariprazine hcl (VRAYLAR) 3 MG CAPS capsule Take 1 capsule by mouth daily  Aden Wilkins MD        Allergies   Allergen Reactions    Tramadol-Acetaminophen Nausea And Vomiting       Past Medical History:   Diagnosis Date    Anxiety     Bronchitis     Depression     Insomnia     Panic attacks     Pneumonia 2009    Spondylosis        Past Surgical History:   Procedure Laterality Date    APPENDECTOMY      Elizabeth [place] Left     Dr. Rhona York.  Medial meniscus tear    WISDOM TOOTH EXTRACTION         Social History     Socioeconomic History    Marital status: Single     Spouse name: Not on file    Number of children: 1    Years of education: Not on file    Highest education level: GED or equivalent   Occupational History    Occupation: unemployed   Social Needs    Financial resource strain: Not on file   [place] insecurity Worry: Never true     Inability: Never true    Transportation needs     Medical: No     Non-medical: No   Tobacco Use    Smoking status: Current Some Day Smoker     Packs/day: 0.25     Years: 8.00     Pack years: 2.00     Types: Cigarettes     Start date: 2007     Last attempt to quit: 7/3/2019     Years since quittin.5    Smokeless tobacco: Never Used    Tobacco comment: using E cig   Substance and Sexual Activity    Alcohol use: No     Alcohol/week: 0.0 standard drinks     Comment: occ    Drug use: Not Currently     Types: Marijuana     Comment: last use of marijuana and pain pill 3 years ago    Sexual activity: Yes     Partners: Female   Lifestyle    Physical activity     Days per week: Not on file     Minutes per session: Not on file    Stress: Not on file   Relationships    Social connections     Talks on phone: Not on file     Gets together: Not on file     Attends Faith service: Not on file     Active member of club or organization: Not on file     Attends meetings of clubs or organizations: Not on file     Relationship status: Not on file    Intimate partner violence     Fear of current or ex partner: No     Emotionally abused: No     Physically abused: No     Forced sexual activity: No   Other Topics Concern    Not on file   Social History Narrative    Lives with significant other and daughter        Family History   Problem Relation Age of Onset    Depression Mother     Liver Disease Mother         WRIGHT    Depression Sister     High Blood Pressure Other        There were no vitals filed for this visit. Estimated body mass index is 23.13 kg/m² as calculated from the following:    Height as of 21: 5' 7\" (1.702 m). Weight as of 21: 147 lb 11.3 oz (67 kg). PHYSICAL EXAM  GENERAL:  Pleasant  male who looks his stated age, awake alert and oriented x3, in mild distress. Occasionally tearful. PSYCH: Moderate psychomotor agitation. Pressured speech.   Good eye contact. Unrestricted affect range. Mood congruent with affect. Somewhat tangential thought.     LABS  Lab Review   Admission on 01/19/2021, Discharged on 01/19/2021   Component Date Value    Ventricular Rate 01/19/2021 113     Atrial Rate 01/19/2021 113     P-R Interval 01/19/2021 116     QRS Duration 01/19/2021 90     Q-T Interval 01/19/2021 304     QTc Calculation (Bazett) 01/19/2021 416     P Axis 01/19/2021 57     R Axis 01/19/2021 92     T Axis 01/19/2021 52     Diagnosis 01/19/2021 Sinus tachycardiaRightward axisOtherwise normal ECGWhen compared with ECG of 27-DEC-2020 18:40,No significant change was foundConfirmed by Kaylah De La Paz MD, [facility] ([id number]) on 1/20/2021 6:45:20 PM     WBC 01/19/2021 9.7     RBC 01/19/2021 5.34     Hemoglobin 01/19/2021 16.2     Hematocrit 01/19/2021 47.7     MCV 01/19/2021 89.3     MCH 01/19/2021 30.3     MCHC 01/19/2021 34.0     RDW 01/19/2021 13.7     Platelets 53/45/7439 309     MPV 01/19/2021 8.4     Neutrophils % 01/19/2021 55.3     Lymphocytes % 01/19/2021 31.5     Monocytes % 01/19/2021 11.1     Eosinophils % 01/19/2021 1.2     Basophils % 01/19/2021 0.9     Neutrophils Absolute 01/19/2021 5.4     Lymphocytes Absolute 01/19/2021 3.1     Monocytes Absolute 01/19/2021 1.1     Eosinophils Absolute 01/19/2021 0.1     Basophils Absolute 01/19/2021 0.1     Sodium 01/19/2021 134*    Potassium reflex Magnesi* 01/19/2021 4.5     Chloride 01/19/2021 95*    CO2 01/19/2021 26     Anion Gap 01/19/2021 13     Glucose 01/19/2021 89     BUN 01/19/2021 20     CREATININE 01/19/2021 0.8*    GFR Non- 01/19/2021 >60     GFR  01/19/2021 >60     Calcium 01/19/2021 10.2     Troponin 01/19/2021 <0.01     D-Dimer, Quant 01/19/2021 <200    Admission on 12/27/2020, Discharged on 12/27/2020   Component Date Value    Ventricular Rate 12/27/2020 116     Atrial Rate 12/27/2020 116     P-R Interval 12/27/2020 126     QRS Duration 12/27/2020 94     Q-T Interval 12/27/2020 328     QTc Calculation (Bazett) 12/27/2020 455     P Axis 12/27/2020 59     R Axis 12/27/2020 89     T Axis 12/27/2020 52     Diagnosis 12/27/2020 Sinus tachycardiaIncomplete right bundle branch blockBorderline ECGConfirmed by Rosy Rocha MD, [facility] Dinh ([id number]) on 12/28/2020 6:08:54 PM          ASSESSMENT/PLAN  1. Bipolar affective disorder type II, current episode hypomanic ([address]  Differential diagnosis includes panic disorder with agoraphobia, generalized anxiety disorder, decompensated depression with psychotic features. Somewhat confounding diagnosis is prior diagnosis of ADHD. What comes across as hypomania may in fact be ADHD. Yet, Patient indicates that Adderall worsened these pre-existing features of hyperactivity, when started on Adderall in 2018. These features had not recovered since stopping the Adderall a couple of weeks ago. Mother with bipolar affective disorder increases likelihood that patient has bipolar affective disorder significantly. If his \"panic attacks\" fail to respond to uptitrating Vraylar, today continue 3 mg daily 2 weeks of samples given, would recommend trial of Effexor 37.5 mg for 2 weeks increasing to 75 mg daily thereafter. Bridging therapy will also patient request prescribed Klonopin 2 weeks supply. Referral to [facility] psychiatry nurse practitioner for counseling, further evaluation. - cariprazine hcl (VRAYLAR) 3 MG CAPS capsule; Take 1 capsule by mouth daily  Dispense: 30 capsule; Refill: 1  2. Palpitations  Expect will be low yield, but Holter echo ordered. Expect we will need to be careful about future testing given patients very somatic focus. No follow-ups on file. It was a pleasure to visit with Mr. Elena Rousseau today. Answered all questions as best I could. Siomara Law MD   Time 14 minutes, 14 minutes care coordination counseling.

## 2021-01-27 ENCOUNTER — TELEPHONE (OUTPATIENT)
Dept: PRIMARY CARE CLINIC | Age: 26
End: 2021-01-27

## 2021-01-27 NOTE — TELEPHONE ENCOUNTER
The office received a call from Rip asking if he can take his Vraylar at night as opposed to during the day. He states that it makes him very drowsy. Please advise.

## 2021-01-28 NOTE — TELEPHONE ENCOUNTER
Pt called about his prev msg he left about Vraylar. I gave him John Nolan msg that he can take it at night.

## 2021-01-29 ENCOUNTER — TELEPHONE (OUTPATIENT)
Dept: PRIMARY CARE CLINIC | Age: 26
End: 2021-01-29

## 2021-01-29 NOTE — TELEPHONE ENCOUNTER
Patient says he's been on Vraylar for 3 weeks and he's still drowsy regardless of the time of day that he takes it. He says he's also restless , lays down but doesn't sleep. Please call and advise.

## 2021-02-01 ENCOUNTER — TELEPHONE (OUTPATIENT)
Dept: PRIMARY CARE CLINIC | Age: 26
End: 2021-02-01

## 2021-02-01 ENCOUNTER — NURSE TRIAGE (OUTPATIENT)
Dept: OTHER | Facility: CLINIC | Age: 26
End: 2021-02-01

## 2021-02-01 DIAGNOSIS — F41.9 ANXIETY: ICD-10-CM

## 2021-02-01 RX ORDER — FLUOXETINE 10 MG/1
10 CAPSULE ORAL DAILY
Qty: 30 CAPSULE | Refills: 0 | Status: SHIPPED | OUTPATIENT
Start: 2021-02-01 | End: 2021-02-17 | Stop reason: SDUPTHER

## 2021-02-01 RX ORDER — CLONAZEPAM 0.5 MG/1
0.5 TABLET ORAL 2 TIMES DAILY PRN
Qty: 28 TABLET | Refills: 0 | Status: SHIPPED | OUTPATIENT
Start: 2021-02-01 | End: 2021-02-12

## 2021-02-01 NOTE — TELEPHONE ENCOUNTER
Patient is not on Adderall. It's the Mayank Hasten that he says is causing the problem. Please call and advise ASAP.

## 2021-02-01 NOTE — TELEPHONE ENCOUNTER
Patient called Avelino at [facility])  with red flag complaint. Brief description of triage: Pt was released from intermediate, has full custody of his daughter, moved in with grandmother and was placed on new medication for anxiety. Pt was taking Prozac in intermediate and felt normal and would like his medication changed to Prozac. Triage indicates for patient to be seen by PCP in next 3 days. Pt would like a virtual visit if possible d/t PCP office \"stressing him out and making him have panic attacks. \"    Care advice provided, patient verbalizes understanding; denies any other questions or concerns; instructed to call back for any new or worsening symptoms. Writer provided warm transfer to Ruddy Melvin at [facility] for appointment scheduling. Attention Provider: Thank you for allowing me to participate in the care of your patient. The patient was connected to triage in response to information provided to the E[facility]. Please do not respond through this encounter as the response is not directed to a shared pool. Reason for Disposition   Symptoms interfere with work or school    Answer Assessment - Initial Assessment Questions  1. CONCERN: \"What happened that made you call today? \"      Pt states he was taking Prozac while in intermediate and was changed to a different medication and now he feels more anxious. 2. ANXIETY SYMPTOM SCREENING: \"Can you describe how you have been feeling? \"  (e.g., tense, restless, panicky, anxious, keyed up, trouble sleeping, trouble concentrating)      Pt states restless sleep, trouble sleeping,third person feeling, anxiety    3. ONSET: \"How long have you been feeling this way? \"      Pt states 4 weeks    4. RECURRENT: \"Have you felt this way before? \"  If yes: \"What happened that time? \" \"What helped these feelings go away in the past?\"       Pt states he felt like this before when he was diagnosed with panic disorder.      5. RISK OF HARM - SUICIDAL IDEATION:  \"Do you ever have thoughts of hurting or killing yourself? \"  (e.g., yes, no, no but preoccupation with thoughts about death)    - INTENT:  \"Do you have thoughts of hurting or killing yourself right NOW? \" (e.g., yes, no, N/A)    - PLAN: \"Do you have a specific plan for how you would do this? \" (e.g., gun, knife, overdose, no plan, N/A)      Pt denies    6. RISK OF HARM - HOMICIDAL IDEATION:  \"Do you ever have thoughts of hurting or killing someone else? \"  (e.g., yes, no, no but preoccupation with thoughts about death)    - INTENT:  \"Do you have thoughts of hurting or killing someone right NOW? \" (e.g., yes, no, N/A)    - PLAN: \"Do you have a specific plan for how you would do this? \" (e.g., gun, knife, no plan, N/A)       Pt denies    7. FUNCTIONAL IMPAIRMENT: \"How have things been going for you overall in your life? Have you had any more difficulties than usual doing your normal daily activities? \"  (e.g., better, same, worse; self-care, school, work, interactions)      Pt sates panic attack, recently moved. He is trying to focus on breathing    8. SUPPORT: \"Who is with you now? \" \"Who do you live with?\" \"Do you have family or friends nearby who you can talk to?\"       Pt states yes, grandmother    5. THERAPIST: \"Do you have a counselor or therapist? Name? \"      Pt states he has an appointment in Feb to see them. Pt states he has not seen her yet, but name is [facility]    10. STRESSORS: \"Has there been any new stress or recent changes in your life? \"        Pt states he has moved in with his grandmother, but states he feels thisis the medication causing his issues    11. CAFFEINE ABUSE: \"Do you drink caffeinated beverages, and how much each day? \" (e.g., coffee, tea, royce)        Pt denies    12. SUBSTANCE ABUSE: \"Do you use any illegal drugs or alcohol? \"        Pt denies. Pt sates he uses nicotine. 13. OTHER SYMPTOMS: \"Do you have any other physical symptoms right now? \" (e.g., chest pain, palpitations, difficulty breathing, fever)     Abd pain    14. PREGNANCY: \"Is there any chance you are pregnant? \" \"When was your last menstrual period? \"        N/A    Protocols used: ANXIETY AND PANIC ATTACK-ADULT-[place]    Please see triage note above.

## 2021-02-01 NOTE — TELEPHONE ENCOUNTER
States was stated on Vraylar 1.5 made him feel more anxious. He started Vraylar 3 mg which has increased the anxiety. He reports has taken all the Clonazepam because of the side effects of the Vraylar. States he has taken Prozac in the past, which was effective and would like to stop the Vraylar and restart the Prozac. He is very anxious, and would like a short term prescription of Clonazepam. He has an appointment with Ken Leblanc on the 12th. States he has taken Wellbutrin in the past, did not like the way it made him feel. He does admit to a history of drug use. He is concerned about his ADHD. Advised will not start on anything at this time due to his increased anxiety.

## 2021-02-02 ENCOUNTER — TELEPHONE (OUTPATIENT)
Dept: PRIMARY CARE CLINIC | Age: 26
End: 2021-02-02

## 2021-02-02 NOTE — TELEPHONE ENCOUNTER
----- Message from [facility] sent at 2/1/2021 12:13 PM EST -----  Subject: Refill Request    QUESTIONS  Name of Medication? clonazePAM (KLONOPIN) 1 MG tablet  Patient-reported dosage and instructions? 1MG 2-3 times a day as needed. How many days do you have left? 0  Preferred Pharmacy? [address]  Pharmacy phone number (if available)? [phone]  Additional Information for Provider? Patient was taking this medication   sometimes three times a day because his anxiety is so high.   ---------------------------------------------------------------------------  --------------  CALL BACK INFO  What is the best way for the office to contact you? OK to leave message on   voicemail  Preferred Call Back Phone Number?  [phone]

## 2021-02-04 ENCOUNTER — CARE COORDINATION (OUTPATIENT)
Dept: CARE COORDINATION | Age: 26
End: 2021-02-04

## 2021-02-04 NOTE — CARE COORDINATION
You Patient resolved from the Care Transitions episode on 1/19/2021  Discussed COVID-19 related testing which was not done at this time. Test results were not done. Patient informed of results, if available? N/A    Patient/family has been provided the following resources and education related to COVID-19:                         Signs, symptoms and red flags related to COVID-19            [facility] [place] exposure and quarantine guidelines            [facility] exposure contact - [phone]            Contact for their local Department of Health                 Patient currently reports that the following symptoms have improved:  Palpitation and Panic   Patient says his PCP changed his medications and it really changed his perception. Patient says he feels very relaxed and safe. No further outreach scheduled with this CTN/ACM. Episode of Care resolved. Patient has this CTN/ACM contact information if future needs arise.

## 2021-02-12 ENCOUNTER — VIRTUAL VISIT (OUTPATIENT)
Dept: PSYCHIATRY | Age: 26
End: 2021-02-12
Payer: COMMERCIAL

## 2021-02-12 DIAGNOSIS — F40.01 AGORAPHOBIA WITH PANIC ATTACKS: ICD-10-CM

## 2021-02-12 DIAGNOSIS — F90.2 ATTENTION DEFICIT HYPERACTIVITY DISORDER (ADHD), COMBINED TYPE: ICD-10-CM

## 2021-02-12 DIAGNOSIS — F39 MOOD DISORDER (HCC): ICD-10-CM

## 2021-02-12 DIAGNOSIS — F41.1 GAD (GENERALIZED ANXIETY DISORDER): Primary | ICD-10-CM

## 2021-02-12 DIAGNOSIS — F19.90 SUBSTANCE USE DISORDER: ICD-10-CM

## 2021-02-12 PROCEDURE — G8420 CALC BMI NORM PARAMETERS: HCPCS | Performed by: NURSE PRACTITIONER

## 2021-02-12 PROCEDURE — 4004F PT TOBACCO SCREEN RCVD TLK: CPT | Performed by: NURSE PRACTITIONER

## 2021-02-12 PROCEDURE — 99214 OFFICE O/P EST MOD 30 MIN: CPT | Performed by: NURSE PRACTITIONER

## 2021-02-12 PROCEDURE — G8482 FLU IMMUNIZE ORDER/ADMIN: HCPCS | Performed by: NURSE PRACTITIONER

## 2021-02-12 PROCEDURE — G8428 CUR MEDS NOT DOCUMENT: HCPCS | Performed by: NURSE PRACTITIONER

## 2021-02-12 RX ORDER — CLONAZEPAM 1 MG/1
1 TABLET ORAL 2 TIMES DAILY PRN
Qty: 28 TABLET | Refills: 0 | Status: SHIPPED | OUTPATIENT
Start: 2021-02-12 | End: 2021-02-17 | Stop reason: SDUPTHER

## 2021-02-12 RX ORDER — OLANZAPINE 2.5 MG/1
2.5 TABLET ORAL NIGHTLY
Qty: 30 TABLET | Refills: 3 | Status: SHIPPED | OUTPATIENT
Start: 2021-02-12 | End: 2021-03-12
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
## 2021-02-17 DIAGNOSIS — F41.1 GAD (GENERALIZED ANXIETY DISORDER): ICD-10-CM

## 2021-02-17 RX ORDER — FLUOXETINE 10 MG/1
10 CAPSULE ORAL DAILY
Qty: 30 CAPSULE | Refills: 2 | Status: CANCELLED | OUTPATIENT
Start: 2021-02-17

## 2021-02-17 RX ORDER — FLUOXETINE 10 MG/1
10 CAPSULE ORAL DAILY
Qty: 30 CAPSULE | Refills: 3 | Status: SHIPPED | OUTPATIENT
Start: 2021-02-17 | End: 2021-02-28 | Stop reason: SDUPTHER

## 2021-02-17 NOTE — TELEPHONE ENCOUNTER
Medication:   Requested Prescriptions     Pending Prescriptions Disp Refills    clonazePAM (KLONOPIN) 1 MG tablet 28 tablet 0     Sig: Take 1 tablet by mouth 2 times daily as needed for Anxiety for up to 14 days. Last Filled:      Patient Phone Number: [phone] (home)     Last appt: 2/12/2021   Next appt: 3/12/2021    Last OARRS:   RX Monitoring 2/12/2021   Attestation -   Periodic Controlled Substance Monitoring No signs of potential drug abuse or diversion identified. ;Possible medication side effects, risk of tolerance/dependence & alternative treatments discussed.

## 2021-03-01 RX ORDER — FLUOXETINE 10 MG/1
10 CAPSULE ORAL DAILY
Qty: 30 CAPSULE | Refills: 3 | Status: SHIPPED | OUTPATIENT
Start: 2021-03-01 | End: 2021-03-12

## 2021-03-03 RX ORDER — CLONAZEPAM 1 MG/1
1 TABLET ORAL 2 TIMES DAILY PRN
Qty: 28 TABLET | Refills: 0 | Status: SHIPPED | OUTPATIENT
Start: 2021-03-03 | End: 2021-03-12 | Stop reason: SDUPTHER

## 2021-03-03 NOTE — TELEPHONE ENCOUNTER
Oarrs reviewed. Clonazepam Last filled:  02/14/2021  4   02/12/2021  Clonazepam 1 MG Tablet  28.00  14 Ch Wet   [id number]   Kro ([id number])   0  4.00 LME  [place]   [place]     Will refill.   Next f/u scheduled 3/12/21

## 2021-03-12 ENCOUNTER — VIRTUAL VISIT (OUTPATIENT)
Dept: PSYCHIATRY | Age: 26
End: 2021-03-12
Payer: COMMERCIAL

## 2021-03-12 DIAGNOSIS — F19.90 SUBSTANCE USE DISORDER: ICD-10-CM

## 2021-03-12 DIAGNOSIS — F39 MOOD DISORDER (HCC): Primary | ICD-10-CM

## 2021-03-12 DIAGNOSIS — F40.01 AGORAPHOBIA WITH PANIC ATTACKS: ICD-10-CM

## 2021-03-12 DIAGNOSIS — F41.1 GAD (GENERALIZED ANXIETY DISORDER): ICD-10-CM

## 2021-03-12 DIAGNOSIS — F90.2 ATTENTION DEFICIT HYPERACTIVITY DISORDER (ADHD), COMBINED TYPE: ICD-10-CM

## 2021-03-12 PROCEDURE — 4004F PT TOBACCO SCREEN RCVD TLK: CPT | Performed by: NURSE PRACTITIONER

## 2021-03-12 PROCEDURE — G8427 DOCREV CUR MEDS BY ELIG CLIN: HCPCS | Performed by: NURSE PRACTITIONER

## 2021-03-12 PROCEDURE — G8420 CALC BMI NORM PARAMETERS: HCPCS | Performed by: NURSE PRACTITIONER

## 2021-03-12 PROCEDURE — 99214 OFFICE O/P EST MOD 30 MIN: CPT | Performed by: NURSE PRACTITIONER

## 2021-03-12 PROCEDURE — G8482 FLU IMMUNIZE ORDER/ADMIN: HCPCS | Performed by: NURSE PRACTITIONER

## 2021-03-12 RX ORDER — OLANZAPINE 5 MG/1
5 TABLET ORAL NIGHTLY
Qty: 30 TABLET | Refills: 2 | Status: SHIPPED | OUTPATIENT
Start: 2021-03-12 | End: 2021-06-07 | Stop reason: SDUPTHER

## 2021-03-12 RX ORDER — FLUOXETINE HYDROCHLORIDE 20 MG/1
20 CAPSULE ORAL DAILY
Qty: 30 CAPSULE | Refills: 2 | Status: SHIPPED | OUTPATIENT
Start: 2021-03-12 | End: 2021-03-12

## 2021-03-12 RX ORDER — CLONAZEPAM 1 MG/1
1 TABLET ORAL 2 TIMES DAILY PRN
Qty: 60 TABLET | Refills: 0 | Status: SHIPPED | OUTPATIENT
Start: 2021-03-12 | End: 2021-03-12

## 2021-03-12 RX ORDER — OLANZAPINE 5 MG/1
5 TABLET ORAL NIGHTLY
Qty: 30 TABLET | Refills: 2 | Status: SHIPPED | OUTPATIENT
Start: 2021-03-12 | End: 2021-03-12 | Stop reason: SDUPTHER

## 2021-03-12 RX ORDER — FLUOXETINE HYDROCHLORIDE 20 MG/1
20 CAPSULE ORAL DAILY
Qty: 30 CAPSULE | Refills: 2 | Status: SHIPPED | OUTPATIENT
Start: 2021-03-12 | End: 2021-06-07 | Stop reason: SDUPTHER

## 2021-03-12 RX ORDER — CLONAZEPAM 1 MG/1
1 TABLET ORAL 2 TIMES DAILY PRN
Qty: 60 TABLET | Refills: 0 | Status: SHIPPED | OUTPATIENT
Start: 2021-03-12 | End: 2021-04-01 | Stop reason: SDUPTHER

## 2021-03-12 NOTE — PROGRESS NOTES
PSYCHIATRY PROGRESS NOTE    Alyce Lee  1995  3/12/21    Face to Face time via doxy. me  Text/email invite sent: [id number]  Start time: 1003p  End time: 0888V    CC:   Chief Complaint   Patient presents with    Follow-up     Per excerpt of re-establishment visit  completed by this provider on 2/12/21:    Pt known to this provider. Had seen x 1 > 1 year ago, 10/11/19.   Then lost to f/u     Per excerpt of pcp note dated 1/14/21:     \"HPI  55-year-old white male with chart history of depression with suicidal attempts that he describes as a tension getting, previously prescribed Prozac, ADHD previously prescribed Adderall, with family history of bipolar affective disorder and ADHD (mother) who presents with several month history of increasing anxiety, decreased sleep, nightmares revolving around death or threat of death to himself or loved ones. [facility] describes his anxiety as panic attacks, associated with shortness of breath palpitations subjective tachycardia (recently in the ED with EKG demonstrating a sinus tachycardia) sweating nausea dry mouth.  When he is not feeling panicky, he is worried about losing control in front of other people, and has socially isolated himself even more than required by Covid endemic.  Denies intrusive imaging, during these spells.  He denies witnessing or hearing of a loved 1 suffering a horrifically traumatic event.  Not a combat .     He describes worrying about number of things, such as his health his career finances his daughter the state of the nation climate change.  Prosper equals 18.  PHQ 15.     He endorses running up credit card debt, gambling, but not high-speed reckless driving, staying up days on end. [facility] disorder questionnaire 7 yeses, some occurring at same time, causing moderate problem with his social roles.       Urine drug screen negative except for Adderall in 2019, TSH normal at the time.     Within the past month, using his victorina, he stopped smoking, drinking heavy caffeine, stop smoking marijuana.  He weaned himself off Adderall.  Denies alcohol abuse.  Previously smoked a pack per day cigarettes, 2 g/day marijuana.  Denies methamphetamine or cocaine use.  Denies use of antipsychotics other than a couple of LSD trips in his teens. \"     Additionally per chart review, was tried on vraylar. This increased anxiety     Today, reports \"get panic attacks\"  \"Now that i've got medicaid, can afford to take care of myself\"     Reports anxiety/panic attacks started happening \"when I quit smoking weed, caffeine and nicotine 45 days ago. Stopped because \"wanted to be a better me\". Realized was self medicating. So started having panic attacks. Went back to caffeine and nicotine. Was causing anxiety so stopped again     First time ever had panic attack was when took adderall. Now caffeine makes it bad, nicotine makes it bad.     Now having panic attacks when in public places. Has other people go to store for me.     Constantly pushing my feet downwards. Almost like don't have control. Flexing downwards becase of stress/anxious     Been on prozac for week and half. Maybe helps a little. Know it takes a while. Maybe more irritable     Clonazepam helps a lot. Know don't want to rely on it.  i'm already out. Julius gave me 1mg bid. Omega Cornejoyeimi made me super anxious so was taking 2/day.     When roland gave them to me, gave me 0.5mg. Noticed wasn't as strong, not as effective     Really weird. Used to skateboard in middle of road. Now scared to go into [email or web address] to get groceries.     Has full custody of 10 y/o daughter, want to be best me I can be.       Drank so much caffeine one day, my resting hr was 170. Scheduled echo and holter for further eval.  No longer checking hr regularly. Seems has calmed down since nicotine and caffeine are out of my life        Get sad from time to time. Generally happy though. If I described myself, i'm typically happy.   Get easily agitated at times. Thinks that getting used to life without substances. Generally optimistic person. 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       Just don't feel like myself recently     Mother bipolar. Julius thought bipolar. Was previously dx \"way back when but was also before my adhd dx and I know they overlap or whatever\"     Mom has  not greatest person in the world. Don't care for the way he was talking to a child so I had to leave. Now i'm at my s. HPI:   Johann Chavarria is a 30 y.o. male with h/o BAD (he doesn't agree with dx); ADHD, anxiety, panic attacks, agoraphobia, SA, who was contacted via telehealth for follow up or to establish psychiatric services. Due to the COVID-19 pandemic restrictions on close contact interactions as recommended by CDC and health authorities, the patient's visit was conducted via telehealth (doxy. me video visit) in lieu of a face to face visit. Patient verbally consented and agreed to proceed. Verified the following information:  Patient's identification: Yes  Patient location:[address] [place] [id number]  Patient's call back number:[phone]  Provider Location:  [place], [place]     Since re-establishment visit 2/12/21:    Feeling better in ways and same in other ways    Still get panicky and stuff when I go out. Just feels supressed when I go out. Sometimes becomes panic attack. Sometimes feel can control a little better but still resting under my chest.      Hungry and sleepy a lot more than normal.    Mom told me I was dx bipolar at 12. Going on vacation 3/20-3/28 to [place]. Will be 2 nephews, niece, daughter, sister, mom, GM, sister's BF       Taking:     Clonazepam 1mg 1-2x/day (getting ready for vacation; daughter's dance competition)   prozac 10mg/day around 7-8am   zyprexa 2.5mg at hs (8/830p)      Substance:   Alcohol:    DENIES RECENT  Never really been my thing.  Now with prozac, don't want to do anything that could interefer with it. Know terrible idea to take clonazepam with etoh                 Illicits:  - occ cbd gummy, tries to limit, not even weekly    - cannabis regular daily use since age 13. Quit 65 days ago   - denies other illicits    \"a slew of things i've done like party drugs, cocaine 1-2x; acid when I was 15\"     \"got hooked on pain pills for a while. Was addicted during toxic relationship with baby momma\"                 Caffeine: maybe soda or 1/2 coffee 1 day/week. Increases anxiety so not worth it   Tobacco: Quit 65 days ago      Psych ROS:      Depression: rates 6-7/10 (10 best); denies obvious lability,     sleep (now 8 hours/night. occ daytime nap but not always)      Trying to get exercise. More motivated, goal oriented     change in appetite, increased, previously said prozac makes me hungry as hell. Since zyprexa, had \"uncontrollable urge to eat. Would eat myself sick, woke up one night to vomit because had eaten too much. Before felt uncontrollable)     Pretty good concentration, still adhd, jumping from this to that. Seeing things to fruition     Rare guilt, hopelessnes, helplessness. Has decreased from previous. poor self esteem,     SOME difficulty with ADL completion (\"bharti still an issue, thinks more r/t habit/routine; forgetfulness thing for me usually\" ); focus is on taking care of daughter     improving interest (music)     DENIES guilt, hopelessness,      DENIES RECENT Tearfulness; historically always been empathetic and cry-y. Maybe once/day     decreased isolation, sometimes rely on clonazepam, one thing that works. Less struggling to leave the house. Challenging to walk into smaller spaces (gas station, other people's houses), doing a little better     [employer],       DENIES SI/SH/HI        Janet: some irritability initially with zyprexa, same thing happened with prozac. Learning to \"take a breath\".   Sometimes having \"adhd highs\" where can't typically anytime leave the house but not always. If in big shopping center, is worse. Can try to talk self out of it in smaller crowds. Klonopin helps    Duration:  15-45 mins. anxious feeling under it can last all day      Endorses:  Shortness of breath, dizziness, diaphoresis, palpitations, hot flashes, chest discomfort and fear of dying        Phobias:  Recent fear of crowds/stores since stopping nicotine/caffeine/cannabis        Psychosis: DENIES A/VH, paranoia, delusions        ADHD:  Dx as adult, \"led myself to that diagnosis. Everything I looked up matched. \"     + difficulty sustaining attention      + easily distracted   + makes careless mistakes   + difficulty with task completion  + avoids or dislikes tasks requiring sustained mental effort    + forgetful in daily activities    + loses things necessary for tasks   + difficulty organizing       + fails to give close attention to details            + fidgets can never sit still, biting nails         + talks excessively   + difficulty waiting turn   + interrupts/intrudes          + history of ADHD symptoms or signs in elementary school            +history of academic performance problems            + history of educational psychology testing  (very young was told borderline, recommended retesting but never did)         PTSD: trouble concentrating, denies recent nightmares,      DENIES flashbacks, hypervigilance, easily startled, reliving the event, avoiding situations that remind you of trauma, physical and mental paralysis when reminded of the experience, same despair, easily angry or irritable,  fear for safety,  Numbness of emotions, feeling of detachment     Eating disorders:  DENIES      QING 7 SCORE 10/11/2019   QING-7 Total Score 6     Interpretation of QING-7 score: 5-9 = mild anxiety, 10-14 = moderate anxiety,   15+ = severe anxiety. Recommend referral to behavioral health for scores 10 or greater.     No data recorded  Interpretation of PHQ-9 score:  1-4 = minimal depression, 5-9 = mild depression,   10-14 = moderate depression; 15-19 = moderately severe depression, 20-27 = severe depression    Mood Disorder Questionnaire 1/14/21 (by pcp)    Positive Responses:  7/13  (7 or more  Yes responses to question 1, and Yes response to #2 and moderate to serious response to #3 considered positive screen for Bipolar Disorder)     Have several of these events happened during the same period of time? Yes     How much of a problem did any of these cause you? Moderate Problem        ASRS Scores 10/11/19 (by this provider):  ADHD screener results were positive. Inattentive:Part A - Total: 22  0-16 Unlikely  17-23 Likely  24+ Highly likely     Hyperactive/Impulsive: Part B - Total: 28  0-16 Unlikely  17-23 Likely  24+ Highly likely           Past Psychiatric History:               Prior hospitalizations: age [age] \"unruly, crazy with mom and stuff, lot of anger issues\"              Prior diagnoses: BAD (disagrees with this); ADHD              Outpatient Treatment:                           Psychiatrist: maybe one                          Therapist:  Connie-Kimi, none currently              Suicide Attempts:  denies              Hx SH:  Cutting when younger, admits was attention setting; denies any recent or urges     Past Psychopharmacologic Trials (including response/reactions):     1.  Vraylar:  Increased anxiety  2. Vyvanse: Worked great when first took it. Switched to adderall d/t insurance change  3. Adderall:  Increased anxiety, Had panic attack  4. Prozac: At present. 5.  Clonazepam:  At present  6. Tramadol  7. Wellbutrin:  Only used brief time       Past Medical/Surgical History:   Past Medical History:   Diagnosis Date    Anxiety     Bronchitis     Depression     Insomnia     Panic attacks     Pneumonia 2009    Spondylosis      Past Surgical History:   Procedure Laterality Date    APPENDECTOMY     Leopoldo Ponce Left     Dr. Maris Yañez.  Medial meniscus tear    WISDOM TOOTH EXTRACTION         Family History   Problem Relation Age of Onset    Depression Mother     Liver Disease Mother         WRIGHT    Depression Sister     High Blood Pressure Other          PCP: HAMLET Louis CNP      Allergies: Allergies   Allergen Reactions    Tramadol-Acetaminophen Nausea And Vomiting         Current Medications:   Current Outpatient Medications on File Prior to Visit   Medication Sig Dispense Refill    NONFORMULARY Indications: cbd gummy prn        No current facility-administered medications on file prior to visit. Controlled Substance Monitoring:    Acute and Chronic Pain Monitoring:   RX Monitoring 3/12/2021   Attestation -   Periodic Controlled Substance Monitoring No signs of potential drug abuse or diversion identified.      03/03/2021  4   03/03/2021  Clonazepam 1 MG Tablet  28.00  14  Wet   [id number]   Kro ([id number])   0  4.00 LME  [place]   [place]   02/14/2021  4   02/12/2021  Clonazepam 1 MG Tablet  28.00  14  Wet   [id number]   Kro ([id number])   0  4.00 LME  [place]   02/01/2021  4   02/01/2021  Clonazepam 0.5 MG Tablet  28.00  14 Mi Christian   [id number]   Kro ([id number])   0  2.00 LME  [place]   01/21/2021  4   01/21/2021  Clonazepam 1 MG Tablet  28.00  14 Cl Libby   [id number]   Kro ([id number])   0  4.00 LME  [place]   12/07/2020  4   12/07/2020  Dextroamp-Amphetamin 20 MG Tab  60.00  30 Mi Christian   [id number]   Kro ([id number])   0   [place]   11/02/2020  4   09/02/2020  Dextroamp-Amphetamin 20 MG Tab  60.00  30 [facility]   [id number]   Kro ([id number])   0   [place]   10/01/2020  4   09/02/2020  Dextroamp-Amphetamin 20 MG Tab  60.00  30 Mi Christian   [id number]   Kro ([id number])   0   [place]   09/02/2020  4   09/02/2020  Dextroamp-Amphetamin 20 MG Tab  60.00  30 [facility]   [id number]   Kro ([id number])   0   [place]   08/03/2020  4   06/04/2020  Dextroamp-Amphetamin 20 MG Tab  60.00  30 [facility]   [id number]   Wal ([id number])   0   Comm [facility]   07/03/2020  4   06/04/2020 Dextroamp-Amphetamin 20 MG Tab  60.00  30 Mi Christian   [id number]   Wal ([id number])   0   [place]   06/04/2020  4   06/04/2020  Dextroamp-Amphetamin 20 MG Tab  60.00  30 Mi Christian   [id number]   Wal ([id number])   0   [place]   05/07/2020  4   05/04/2020  Dextroamp-Amphetamin 20 MG Tab  60.00  30 Mi Christian   [id number]   Kro ([id number])   0   [place]   04/09/2020  4   04/09/2020  Dextroamp-Amphetamin 20 MG Tab  60.00  30 Mi Christian   [id number]   Kro ([id number])   0   [place]   04/02/2020  3   04/02/2020  Dextroamp-Amphetamin 20 MG Tab  14.00  7 Da Ran   [id number]   Kro (1801)   0   [place]   03/04/2020  2   03/04/2020  Dextroamp-Amphetamin 20 MG Tab  60.00  30 Mi Christian   [id number]   Wal (2005)   0   [place]   02/04/2020  3   12/02/2019  Dextroamp-Amphetamin 20 MG Tab  60.00  30 Mi Christian   [id number]   Kro ([id number])   0   [place]   01/04/2020  3   12/02/2019  Dextroamp-Amphetamin 20 MG Tab  60.00  30 Mi Christian   [id number]   Kro ([id number])   0   [place]   12/05/2019  3   12/02/2019  Dextroamp-Amphetamin 20 MG Tab  60.00  30 Mi Christian   [id number]   Kro (1801)   0   [place]   11/07/2019  2   11/07/2019  Dextroamp-Amphetamin 20 MG Tab  60.00  30 Mi Christian   [id number]   Wal (2005)   0   [place]   10/11/2019  3   10/11/2019  Dextroamp-Amphetamin 20 MG Tab  60.00  30  Wet   [id number]   Kro ([id number])   0   [place]   09/06/2019  2   09/06/2019  Dextroamp-Amphetamin 20 MG Tab  60.00  30 Mi Christian   [id number]   Wal (2005)   0   [place]   08/05/2019  2   08/02/2019  Vyvanse 20 MG Capsule  60.00  30 Mi Christian   [id number]   Wal (2005)   0   [place]   07/18/2019  1   07/15/2019  Vyvanse 20 MG Capsule  30.00  30 [facility]   [id number]   Katie (2005)   0   [place]             OBJECTIVE:  Vitals: Wt Readings from Last 3 Encounters:   01/21/21 149 lb (67.6 kg)   01/19/21 147 lb 11.3 oz (67 kg)   01/14/21 147 lb 9.6 oz (67 kg)       There were no vitals filed for this visit.         ROS: Denies trouble with fever, rash, headache, vision changes, chest pain, shortness of breath, nausea, extremity pain, weakness, dysuria.   \"pushing down my feet and swallowing a lot\"           Mental Status Exam:      Appearance    alert, cooperative, appropriate dress for season, appears stated age, fidgety, walking around  Muscle strength/tone: no atrophy or abnormal movements  Gait/station: normal per limited eval via vv  Speech    spontaneous, normal rate, normal volume and hyperverbal  Mood    Anxious  Depressed  Affect   less labile affect Congruent to thought content and mood  Thought Content    excessive preoccupations, no delusions voiced  Thought Process   somewhat circumstantial and perservative, though improved from previous  Associations    logical connections  Perceptions: denies AH/VH, does not appear preoccupied with the internal environment  [address]  Orientation    oriented to person, place, time, and general circumstances  Memory    recent and remote memory intact  Attention/Concentration    impaired  Ability to understand instructions Yes  Ability to respond meaningfully Yes  Language: [address] [place] [address] knowledge/Intellect: Average  SI:   no suicidal ideation  HI: Denies HI         Labs:     Admission on 01/19/2021, Discharged on 01/19/2021   Component Date Value    Ventricular Rate 01/19/2021 113     Atrial Rate 01/19/2021 113     P-R Interval 01/19/2021 116     QRS Duration 01/19/2021 90     Q-T Interval 01/19/2021 304     QTc Calculation (Bazett) 01/19/2021 416     P Axis 01/19/2021 57     R Axis 01/19/2021 92     T Axis 01/19/2021 52     Diagnosis 01/19/2021 Sinus tachycardiaRightward axisOtherwise normal ECGWhen compared with ECG of 27-DEC-2020 18:40,No significant change was foundConfirmed by Joann Wolf MD, Justin Fuchs ([id number]) on 1/20/2021 6:45:20 PM     WBC 01/19/2021 9.7     RBC 01/19/2021 5.34     Hemoglobin 01/19/2021 16.2     Hematocrit 01/19/2021 47.7     MCV 01/19/2021 89.3     MCH 01/19/2021 30.3     MCHC 01/19/2021 34.0     RDW 2021 13.7     Platelets  309     MPV 2021 8.4     Neutrophils % 2021 55.3     Lymphocytes % 2021 31.5     Monocytes % 2021 11.1     Eosinophils % 2021 1.2     Basophils % 2021 0.9     Neutrophils Absolute 2021 5.4     Lymphocytes Absolute 2021 3.1     Monocytes Absolute 2021 1.1     Eosinophils Absolute 2021 0.1     Basophils Absolute 2021 0.1     Sodium 2021 134*    Potassium reflex Magnesi* 2021 4.5     Chloride 2021 95*    CO2 2021 26     Anion Gap 2021 13     Glucose 2021 89     BUN 2021 20     CREATININE 2021 0.8*    GFR Non- 2021 >60     GFR  2021 >60     Calcium 2021 10.2     Troponin 2021 <0.01     D-Dimer, Quant 2021 <200    Admission on 2020, Discharged on 2020   Component Date Value    Ventricular Rate 2020 116     Atrial Rate 2020 116     P-R Interval 2020 126     QRS Duration 2020 94     Q-T Interval 2020 328     QTc Calculation (Bazett) 2020 455     P Axis 2020 59     R Axis 2020 89     T Axis 2020 52     Diagnosis 2020 Sinus tachycardiaIncomplete right bundle branch blockBorderline ECGConfirmed by Taz Syed MD, Nellene Brian ([id number]) on 2020 6:08:54 PM        Last Drug screen:  Lab Results   Component Value Date    LABAMPH neg 2016    LABBARB neg 2016    LABBENZ neg 2016    COCAIMETSCRU neg 2016    THC pos 2016    MDMA Not Detected 2019    LABMETH neg 2016    OPIATESCREENURINE neg 2016    OXTCOSU neg 2016    PHENCYCLIDINESCREENURINE neg 2016    PROPOXYPHENE neg 2016    METAMPU neg 2016     EK21  Sinus tach  QTc 416        Imaging: no head imaging on file  - denies prior concussions or TBI     Genesight:  - completed irritability though thinks may be somewhat helpful. Family members have had positive response to this     - increase zyprexa 5mg nightly for mood/anxiety/sleep. Titrate as tolerated. Family member has had positive response to this     - continue klonopin 1mg bid prn anxiety  Pt understands this is not an appropriate long-term anxiety solution. Understands the risks of dependence/abuse, falls, cognitive deficits, dementia, all-cause mortality.     - [employer] testing results reviewed. will have results mailed to pt home        B)  ADHD  - historically has only agreed with this diagnosis. Though states that adderall increased anxiety and thinks contributed to first panic attack     - will hold on trying anything for adhd at present. Prefer to have mood/anxiety stabilized first.    - consider vyvanse as says did have positive response to this in the past and has less abuse potential d/t pharmacokinetics     -Labs: reviewed in Epic NEEDS fasting lipids, hgba1c (starting zyprexa), would also add vit d and tsh and repeat cmp (prior mild hyponatremia and started ssri). Can be done at next in person visit              1/19/21:  Cbc wnl; bmp (mild hyponatremia)     -Recommend outpt therapy.     -OARRS reviewed, c/w history  -R/b/se/a d/w pt who consents.     3. Medical  -Following with HAMLET Gonzáles - CNP  - was to have echo & 24 hour holter monitor 2/23/21, not completed. Didn't have transportation, did r/s for after vacation        4. Substance   -h/o cannabis use since age 13. Recently quit 65 days ago. H/o various other substances throughout lifetime     5.  RTC - 4 weeks       Viviana Trivedi, [address], [place] Nurse Practitioner

## 2021-03-29 ENCOUNTER — HOSPITAL ENCOUNTER (OUTPATIENT)
Dept: NON INVASIVE DIAGNOSTICS | Age: 26
Discharge: HOME | End: 2021-03-29
Payer: COMMERCIAL

## 2021-03-29 DIAGNOSIS — R00.2 PALPITATIONS: ICD-10-CM

## 2021-03-29 LAB
LV EF: 58 %
LVEF MODALITY: NORMAL

## 2021-04-05 ENCOUNTER — TELEPHONE (OUTPATIENT)
Dept: PRIMARY CARE CLINIC | Age: 26
End: 2021-04-05

## 2021-04-07 ENCOUNTER — TELEPHONE (OUTPATIENT)
Dept: PRIMARY CARE CLINIC | Age: 26
End: 2021-04-07

## 2021-04-07 NOTE — TELEPHONE ENCOUNTER
----- Message from Nolvia Rand sent at 4/7/2021 10:20 AM EDT -----  Subject: Referral Request    QUESTIONS   Reason for referral request? Patient would like a referral for cognitive   behavioral therapy. It will be in his chart from the psychiatrist. They   told him that it has to be a referral from his doctor. For mental health   case management   Has the physician seen you for this condition before? No   Preferred Specialist (if applicable)? Do you already have an appointment scheduled? Additional Information for Provider? Patient would like Ty Covarrubias to   refer him to the doctor she would like him to see.  ---------------------------------------------------------------------------  --------------  [address]  What is the best way for the office to contact you? OK to leave message on   voicemail  Preferred Call Back Phone Number?  [phone]

## 2021-04-09 NOTE — TELEPHONE ENCOUNTER
Have given pt therapy referrals previously via [employer] message 2/2021. I can talk with him about this again at our next appt.

## 2021-04-13 ENCOUNTER — TELEPHONE (OUTPATIENT)
Dept: PRIMARY CARE CLINIC | Age: 26
End: 2021-04-13

## 2021-04-16 ENCOUNTER — VIRTUAL VISIT (OUTPATIENT)
Dept: PRIMARY CARE CLINIC | Age: 26
End: 2021-04-16
Payer: COMMERCIAL

## 2021-04-16 DIAGNOSIS — F33.0 MILD EPISODE OF RECURRENT MAJOR DEPRESSIVE DISORDER (HCC): Primary | ICD-10-CM

## 2021-04-16 PROCEDURE — 99213 OFFICE O/P EST LOW 20 MIN: CPT | Performed by: NURSE PRACTITIONER

## 2021-04-16 PROCEDURE — G8420 CALC BMI NORM PARAMETERS: HCPCS | Performed by: NURSE PRACTITIONER

## 2021-04-16 PROCEDURE — G8427 DOCREV CUR MEDS BY ELIG CLIN: HCPCS | Performed by: NURSE PRACTITIONER

## 2021-04-16 PROCEDURE — 4004F PT TOBACCO SCREEN RCVD TLK: CPT | Performed by: NURSE PRACTITIONER

## 2021-04-16 ASSESSMENT — ENCOUNTER SYMPTOMS
CHEST TIGHTNESS: 0
SHORTNESS OF BREATH: 0
DIARRHEA: 0
CONSTIPATION: 0
ABDOMINAL PAIN: 0

## 2021-04-16 ASSESSMENT — VISUAL ACUITY: OU: 1

## 2021-04-16 NOTE — PROGRESS NOTES
[address] [facility] [address]  Dept: [phone]       2021   Restrictions on close contact interactions as recommended by CDC and health authorities, the patient's visit was conducted via telemedicine in lieu of a face to face visit. Patient verbally consented and agreed to proceed  Alvin Owusu (:  1995)is a 30 y.o. male, here for evaluation of the following medical concerns:    HPI   He was evaluated by Dr. Guanako Zamudio for Palpitations in January, and evaluated by Brady Gallardo CNP for anxiety/depression. Echo to evaluate heart was without abnormalities, 24 hour holter monitor revealed some tachycardia, no arrhthymias. Reports taking prescribed Zyprexa and Prozac. Complains of headaches, increased hunger and weight gain. He is requesting a second opinion of the Bipolar diagnosis. States his anxiety \"is under the surface. \"  He is on his way to a  of a family member, death due to an accidental overdose. Lab Results   Component Value Date    WBC 9.7 2021    HGB 16.2 2021    HCT 47.7 2021    MCV 89.3 2021     2021     Lab Results   Component Value Date     (L) 2021    K 4.5 2021    CL 95 (L) 2021    CO2 26 2021    BUN 20 2021    CREATININE 0.8 (L) 2021    GLUCOSE 89 2021    CALCIUM 10.2 2021    PROT 7.2 2019    LABALBU 5.2 (H) 2019    BILITOT 0.3 2019    ALKPHOS 62 2019    AST 20 2019    ALT 14 2019    LABGLOM >60 2021    GFRAA >60 2021    AGRATIO 2.6 (H) 2019    GLOB 2.0 2019       Review of Systems   Constitutional: Negative for activity change and fever. HENT: Negative for congestion. Eyes: Negative for visual disturbance. Respiratory: Negative for chest tightness and shortness of breath. Cardiovascular: Negative for chest pain, palpitations and leg swelling. Gastrointestinal: Negative for abdominal pain, constipation and diarrhea. Endocrine: Negative for polyuria. Genitourinary: Negative for dysuria. Musculoskeletal: Negative for arthralgias and myalgias. Skin: Negative for rash. Neurological: Negative for dizziness, light-headedness and headaches. Psychiatric/Behavioral: Negative for agitation, decreased concentration and sleep disturbance. The patient is not nervous/anxious. Prior to Visit Medications    Medication Sig Taking? Authorizing Provider   clonazePAM (KLONOPIN) 1 MG tablet Take 1 tablet by mouth 2 times daily as needed for Anxiety for up to 30 days. Yes HAMLET Vazquez CNP   OLANZapine (ZYPREXA) 5 MG tablet Take 1 tablet by mouth nightly Yes HAMLET Vazquez CNP   FLUoxetine (PROZAC) 20 MG capsule Take 1 capsule by mouth daily Yes HAMLET Vazquez CNP        Social History     Tobacco Use    Smoking status: Current Some Day Smoker     Packs/day: 0.25     Years: 8.00     Pack years: 2.00     Types: Cigarettes     Start date: 2007     Last attempt to quit: 7/3/2019     Years since quittin.7    Smokeless tobacco: Never Used    Tobacco comment: using E cig   Substance Use Topics    Alcohol use: No     Alcohol/week: 0.0 standard drinks     Comment: occ        There were no vitals filed for this visit. Estimated body mass index is 23.34 kg/m² as calculated from the following:    Height as of 21: 5' 7\" (1.702 m). Weight as of 21: 149 lb (67.6 kg). Physical exam limited due to virtual visit. Physical Exam  Constitutional:       Appearance: Normal appearance. He is well-developed. HENT:      Head: Normocephalic. Right Ear: Hearing normal.      Left Ear: Hearing normal.   Eyes:      General: Lids are normal. Vision grossly intact. Pulmonary:      Effort: Pulmonary effort is normal.   Neurological:      General: No focal deficit present.       Mental Status: He is alert and oriented to person, place, and time. Psychiatric:         Behavior: Behavior is cooperative. ASSESSMENT/PLAN:  1. Mild episode of recurrent major depressive disorder ([facility] [address]  -Follow up with Herminio Short, [address] [place] [address] Psychiatry referral from Insurance   -continue medications  -reviewed with him the results of Echo and Holter Monitor      No follow-ups on file.         --HAMLET Ramos - CNP on 4/16/2021 at 12:37 PM

## 2021-05-07 ENCOUNTER — VIRTUAL VISIT (OUTPATIENT)
Dept: PSYCHIATRY | Age: 26
End: 2021-05-07
Payer: COMMERCIAL

## 2021-05-07 DIAGNOSIS — F40.01 AGORAPHOBIA WITH PANIC ATTACKS: ICD-10-CM

## 2021-05-07 DIAGNOSIS — F19.90 SUBSTANCE USE DISORDER: ICD-10-CM

## 2021-05-07 DIAGNOSIS — F39 MOOD DISORDER (HCC): ICD-10-CM

## 2021-05-07 DIAGNOSIS — F41.1 GAD (GENERALIZED ANXIETY DISORDER): ICD-10-CM

## 2021-05-07 DIAGNOSIS — F90.2 ATTENTION DEFICIT HYPERACTIVITY DISORDER (ADHD), COMBINED TYPE: Primary | ICD-10-CM

## 2021-05-07 PROCEDURE — G8428 CUR MEDS NOT DOCUMENT: HCPCS | Performed by: NURSE PRACTITIONER

## 2021-05-07 PROCEDURE — 99214 OFFICE O/P EST MOD 30 MIN: CPT | Performed by: NURSE PRACTITIONER

## 2021-05-07 PROCEDURE — G8420 CALC BMI NORM PARAMETERS: HCPCS | Performed by: NURSE PRACTITIONER

## 2021-05-07 PROCEDURE — 4004F PT TOBACCO SCREEN RCVD TLK: CPT | Performed by: NURSE PRACTITIONER

## 2021-05-07 NOTE — PROGRESS NOTES
PSYCHIATRY PROGRESS NOTE    Jerson Hatfield  1995 5/7/21    Face to Face time via doxy. me  Text/email invite sent: 208p  Start time:  208p  End time: 252p    CC:   Chief Complaint   Patient presents with    Follow-up     Per excerpt of re-establishment visit  completed by this provider on 2/12/21:    Pt known to this provider. Had seen x 1 > 1 year ago, 10/11/19.   Then lost to f/u     Per excerpt of pcp note dated 1/14/21:     \"HPI  25-year-old white male with chart history of depression with suicidal attempts that he describes as a tension getting, previously prescribed Prozac, ADHD previously prescribed Adderall, with family history of bipolar affective disorder and ADHD (mother) who presents with several month history of increasing anxiety, decreased sleep, nightmares revolving around death or threat of death to himself or loved ones. [facility] describes his anxiety as panic attacks, associated with shortness of breath palpitations subjective tachycardia (recently in the ED with EKG demonstrating a sinus tachycardia) sweating nausea dry mouth.  When he is not feeling panicky, he is worried about losing control in front of other people, and has socially isolated himself even more than required by Covid endemic.  Denies intrusive imaging, during these spells.  He denies witnessing or hearing of a loved 1 suffering a horrifically traumatic event.  Not a combat .     He describes worrying about number of things, such as his health his career finances his daughter the state of the nation climate change.  Prosper equals 18.  PHQ 15.     He endorses running up credit card debt, gambling, but not high-speed reckless driving, staying up days on end. [facility] disorder questionnaire 7 yeses, some occurring at same time, causing moderate problem with his social roles.       Urine drug screen negative except for Adderall in 2019, TSH normal at the time.     Within the past month, using his victorina, he stopped smoking, drinking heavy caffeine, stop smoking marijuana.  He weaned himself off Adderall.  Denies alcohol abuse.  Previously smoked a pack per day cigarettes, 2 g/day marijuana.  Denies methamphetamine or cocaine use.  Denies use of antipsychotics other than a couple of LSD trips in his teens. \"     Additionally per chart review, was tried on vraylar. This increased anxiety     Today, reports \"get panic attacks\"  \"Now that i've got medicaid, can afford to take care of myself\"     Reports anxiety/panic attacks started happening \"when I quit smoking weed, caffeine and nicotine 45 days ago. Stopped because \"wanted to be a better me\". Realized was self medicating. So started having panic attacks. Went back to caffeine and nicotine. Was causing anxiety so stopped again     First time ever had panic attack was when took adderall. Now caffeine makes it bad, nicotine makes it bad.     Now having panic attacks when in public places. Has other people go to store for me.     Constantly pushing my feet downwards. Almost like don't have control. Flexing downwards becase of stress/anxious     Been on prozac for week and half. Maybe helps a little. Know it takes a while. Maybe more irritable     Clonazepam helps a lot. Know don't want to rely on it.  i'm already out. Julius gave me 1mg bid. Omega Cornejoyeimi made me super anxious so was taking 2/day.     When roland gave them to me, gave me 0.5mg. Noticed wasn't as strong, not as effective     Really weird. Used to skateboard in middle of road. Now scared to go into [employer] to get groceries.     Has full custody of 10 y/o daughter, want to be best me I can be.       Drank so much caffeine one day, my resting hr was 170. Scheduled echo and holter for further eval.  No longer checking hr regularly. Seems has calmed down since nicotine and caffeine are out of my life        Get sad from time to time. Generally happy though. If I described myself, i'm typically happy.   Get easily agitated at times. Thinks that getting used to life without substances. Generally optimistic person. 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       Just don't feel like myself recently     Mother bipolar. Julius thought bipolar. Was previously dx \"way back when but was also before my adhd dx and I know they overlap or whatever\"     Mom has  not greatest person in the world. Don't care for the way he was talking to a child so I had to leave. Now i'm at my GMs. HPI:   Yash Bower is a 30 y.o. male with h/o BAD (he doesn't agree with dx); ADHD, anxiety, panic attacks, agoraphobia, SA, who was contacted via telehealth for follow up or to establish psychiatric services. Due to the COVID-19 pandemic restrictions on close contact interactions as recommended by CDC and health authorities, the patient's visit was conducted via telehealth (doxy. me video visit) in lieu of a face to face visit. Patient verbally consented and agreed to proceed. 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  [employer]    They offered work from home, works for my situation    Also started college classes online. [employer] school. One class/month. This is first class. Enrolled month ago    Koreyvero Jamesy was ok. Few things were hard. Getting on rollercoasters was hard. But glad I went and enjoyed time with family    Very unmotivated and sleepy. Don't know if meds or not    ADHD back in full force. Work from home is blessing but also hard. When I went up on zyprexa, noticed increased appetite and daytime fatigue    Was having migraines almost daily x 2-3 weeks straight. Could have been activity (mostly inside looking at screen). None over past 2 days      Therapy:  Francesco Sleeper and saw someone week or 2 ago. Sign said provider would come out and get you. Waited 30 mins. Left 3 different vms. They still haven't gotten back to me    - needs to call careanne and find another provider      Taking:     Clonazepam 1mg 1-2x/day (has approx 13-14 tabs left in current Rx)   prozac 20mg/day around 7-8am   zyprexa 5mg at hs (7p, to bed 8/9pm, up 7-8a)      Substance:   Alcohol:   DENIES RECENT  \"too scared to\"  Never really been my thing. Now with prozac, don't want to do anything that could interefer with it. Know terrible idea to take clonazepam with etoh                 Illicits:  - no longer using cbd gummy, tries to limit, doesn't want to induce panic attacks    - cannabis regular daily use since age 13.  last time was several months ago. Caused panic attack     - denies other illicits    \"a slew of things i've done like party drugs, cocaine 1-2x; acid when I was 15\"     \"got hooked on pain pills for a while. Was addicted during toxic relationship with baby momma\"                 Caffeine: occ pop. Can tell anxiety gets a little higher. May try to stop again. Restarted when feeling tired     Tobacco: vape \"here and there\". No cigarettes in several months. Can't recall how long      Psych ROS:      Depression: good mood, rates 6-8/10 (10 best); denies lability,     Sleep good, maybe too much, almost 12 hours. No daytime naps    Trying to get exercise. Struggling d/t adhd symptoms. Motivation, drive and focus are gone     appetite, increased, learned to control. Gained weight, not complaining about that. Was very skinny    Concentration not great, still adhd, jumping from this to that.      Rare guilt, hopelessnes,     + Helplessness \"in terms of pandemic and panic\".    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  had death in family (aunt), big drug/etoh user. I saw it coming. Cousin delores found her dead. Think it was fentanyl.            isolation, tries to put self out there, dad wants us to go to his [employer] this weekend  Can drive now. Did fine recently driving 25 miles away    Less struggling to leave the house. Challenging to walk into smaller spaces (gas station, other people's houses), doing a little better     Low energy,       DENIES SI/SH/HI        Janet: less irritability with nore time on meds. \"calmer, more rational\"    Learning to \"take a breath\". Sometimes having \"adhd highs\" where can't focus, less than previous and feel can control; rapid speech at baseline, can slow down when need to;     + racing thoughts, constant. daily intrusive thoughts (depending upon activity), worry of panic attack        struggles to articulate what he's actually experienced. \"because I was always smoking weed I don't know\"     LIFETIME HISTORY:  rapid speech, easily distracted or decreased attention, racing thoughts,     Have had episodes in past:  expansive mood,      DENIES PREVIOUS:  insomnia with increased energy,  increase in energy and goal directed behavior, grandiosity, flight of ideas        IMPULSIVITY:  denies recent   Historically Always impulsive. Walk out of jobs              Anxiety: still get anxiety, rates 6-8, was 8-10/10 (10 worst);     intermittent worry, thinks med working a little bit. Still feel it there and \"under the surface\"  \"feel medications pushing it back down and me able to talk myself out of it\"     Worse if thinks about going places, or in certain environments; currently worried about mental health; worried about having panic attacks if leaves the house     -has driven recently. Used clonazepam.  Know shouldn't but was morning, had energy. Centers to me to not feel so tense or tight. 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 Had GM with me. Since then driving without it. Still using safety nets (takes GM with me) No car. Has license.       DENIES irritability,      sleep better    somatic complaints (toe curling, cracking neck d/t muscle tension; only heart racing, diaphoreis, feel like going to pass out if panic attack sets in)     + restlessness,      + fatigue;      +avoidant of social situations     fear of judgement \"when anxiety hits\",      DENIES fear of doing/saying wrong things,         OCD:  DENIES Repetitive actions or rituals, excessive hand washing, contamination fears, need for symmetry, violent thoughts, hoarding, fear of being harmed, mental or verbal repetition of words or phrases and counting     Panic:  2-3/week, can be less or more. Situational.  Typically large crowds, going places on my own, being far from home or situations with certain people. Some better. Duration:  15-45 mins. anxious feeling under it can last all day      Endorses:  Shortness of breath, dizziness, diaphoresis, palpitations, hot flashes, chest discomfort and fear of dying        Phobias:  Recent fear of crowds/stores since stopping nicotine/caffeine/cannabis        Psychosis: DENIES A/VH, paranoia, delusions        ADHD:  Dx as adult, \"led myself to that diagnosis. Everything I looked up matched. \"     + difficulty sustaining attention      + easily distracted   + makes careless mistakes   + difficulty with task completion  + avoids or dislikes tasks requiring sustained mental effort    + forgetful in daily activities    + loses things necessary for tasks   + difficulty organizing       + fails to give close attention to details            + fidgets can never sit still, biting nails         + talks excessively   + difficulty waiting turn   + interrupts/intrudes          + history of ADHD symptoms or signs in elementary school            +history of academic performance problems            + history of educational psychology testing  (very young was told borderline, recommended retesting but never did)         PTSD: trouble concentrating, denies recent nightmares,      DENIES flashbacks, hypervigilance, easily startled, reliving the event, avoiding situations that remind you of trauma, physical and mental paralysis when reminded of the experience, same despair, easily angry or irritable,  fear for safety,  Numbness of emotions, feeling of detachment     Eating disorders:  DENIES      QING 7 SCORE 10/11/2019   QING-7 Total Score 6     Interpretation of QING-7 score: 5-9 = mild anxiety, 10-14 = moderate anxiety,   15+ = severe anxiety. Recommend referral to behavioral health for scores 10 or greater. No data recorded  Interpretation of PHQ-9 score:  1-4 = minimal depression, 5-9 = mild depression,   10-14 = moderate depression; 15-19 = moderately severe depression, 20-27 = severe depression    Mood Disorder Questionnaire 1/14/21 (by pcp)    Positive Responses:  7/13  (7 or more  Yes responses to question 1, and Yes response to #2 and moderate to serious response to #3 considered positive screen for Bipolar Disorder)     Have several of these events happened during the same period of time? Yes     How much of a problem did any of these cause you? Moderate Problem        ASRS Scores 10/11/19 (by this provider):  ADHD screener results were positive.   Inattentive:Part A - Total: 22  0-16 Unlikely  17-23 Likely  24+ Highly likely     Hyperactive/Impulsive: Part B - Total: 28  0-16 Unlikely  17-23 Likely  24+ Highly likely           Past Psychiatric History:               Prior hospitalizations: age [age] \"unruly, crazy with mom and stuff, lot of anger issues\"              Prior diagnoses: BAD (disagrees with this); ADHD              Outpatient Treatment:                           Psychiatrist: maybe one Therapist:  5-6, none currently              Suicide Attempts:  denies              Hx SH:  Cutting when younger, admits was attention setting; denies any recent or urges     Past Psychopharmacologic Trials (including response/reactions):     1.  Vraylar:  Increased anxiety  2. Vyvanse: Worked great when first took it. Switched to adderall d/t insurance change  3. Adderall:  Increased anxiety, Had panic attack  4. Prozac: At present. 5.  Clonazepam:  At present  6. Tramadol  7. Wellbutrin:  Only used brief time       Past Medical/Surgical History:   Past Medical History:   Diagnosis Date    Anxiety     Bronchitis     Depression     Insomnia     Panic attacks     Pneumonia 2009    Spondylosis      Past Surgical History:   Procedure Laterality Date    APPENDECTOMY     [place] Flan[place] Left     Dr. Zabala Olivet. Medial meniscus tear    WISDOM TOOTH EXTRACTION         Family History   Problem Relation Age of Onset    Depression Mother     Liver Disease Mother         WRIGHT    Depression Sister     High Blood Pressure Other          PCP: HAMLET Miller - CNP      Allergies: Allergies   Allergen Reactions    Tramadol-Acetaminophen Nausea And Vomiting         Current Medications:   Current Outpatient Medications on File Prior to Visit   Medication Sig Dispense Refill    clonazePAM (KLONOPIN) 1 MG tablet Take 1 tablet by mouth 2 times daily as needed for Anxiety for up to 30 days. 60 tablet 0    OLANZapine (ZYPREXA) 5 MG tablet Take 1 tablet by mouth nightly 30 tablet 2    FLUoxetine (PROZAC) 20 MG capsule Take 1 capsule by mouth daily 30 capsule 2     No current facility-administered medications on file prior to visit. Controlled Substance Monitoring:    Acute and Chronic Pain Monitoring:   RX Monitoring 4/5/2021   Attestation -   Periodic Controlled Substance Monitoring No signs of potential drug abuse or diversion identified.      04/12/2021  4   04/05/2021 02/04/2020  3   12/02/2019  Dextroamp-Amphetamin 20 MG Tab  60.00  [address]   [id number]   [place] ([id number])   0   [place] Ins   [place]   01/04/2020  3   12/02/2019  Dextroamp-Amphetamin 20 MG Tab  60.00  30 [address]   [id number]   [place] ([id number])   0   [place]   12/05/2019  3   12/02/2019  Dextroamp-Amphetamin 20 MG Tab  60.00  30 [address]   [id number]   [place] ([id number])   0   [place]   11/07/2019  2   11/07/2019  Dextroamp-Amphetamin 20 MG Tab  60.00  30 [address]   [id number]   [place] (2005)   0   [place]   10/11/2019  3   10/11/2019  Dextroamp-Amphetamin 20 MG Tab  60.00  30 [address]   [id number]   [place] ([id number])   0   [place]   09/06/2019  2   09/06/2019  Dextroamp-Amphetamin 20 MG Tab  60.00  [facility]   [id number]   [place] (2005)   0   [place]   08/05/2019  2   08/02/2019  Vyvanse 20 MG Capsule  60.00  [facility]   [id number]   [place] (2005)   0   [place]   07/18/2019  1   07/15/2019  Vyvanse 20 MG Capsule  30.00  [facility]   [id number]   Wal (2005)   0   [place]           OBJECTIVE:  Vitals: Wt Readings from Last 3 Encounters:   01/21/21 149 lb (67.6 kg)   01/19/21 147 lb 11.3 oz (67 kg)   01/14/21 147 lb 9.6 oz (67 kg)       There were no vitals filed for this visit. ROS: Denies trouble with fever, rash, headache, vision changes, chest pain, shortness of breath, nausea, extremity pain, weakness, dysuria.   \"pushing down my feet/toe curling a lot\"          Mental Status Exam:      Appearance    alert, cooperative, appropriate dress for season, appears stated age,   Muscle strength/tone: no atrophy or abnormal movements  Gait/station: normal per limited eval via vv  Speech    spontaneous, normal rate, normal volume  Mood euthymic  Affect   Congruent to thought content and mood  Thought Content    excessive preoccupations, no delusions voiced  Thought Process   somewhat circumstantial and perservative, though improved from previous  Associations    logical connections  Perceptions: denies AH/VH, does not appear preoccupied with the internal environment  [address]  Orientation    oriented to person, place, time, and general circumstances  Memory    recent and remote memory intact  Attention/Concentration    impaired  Ability to understand instructions Yes  Ability to respond meaningfully Yes  Language: [address] [place] [address] of knowledge/Intellect: Average  SI:   no suicidal ideation  HI: Denies HI         Labs:     Hospital Outpatient Visit on 03/29/2021   Component Date Value    Hookup Date 03/29/2021 Mar 29 2021      Hookup Time 03/29/2021 11:42:48     Acquisition Duration 03/29/2021 [id number]     Number Of QRS Complexes 03/29/2021 [id number]     Number Of Ventricular Ec* 03/29/2021 5     Number Of Ventricular Bi* 03/29/2021 0     Number Of Ventricular Co* 03/29/2021 0     Number Of Superventricul* 03/29/2021 1     Number Of Suptaventricul* 03/29/2021 1     Number Of Supraventricul* 03/29/2021 0     Average Heart Rate 03/29/2021 88     Holter Max Heart Rate 03/29/2021 163     Min Heart Rate 03/29/2021 56     Max Heart Rate Time/Date 03/29/2021 [id number]     Min Heart Rate Time/Date 03/29/2021 [id number]     Diagnosis 03/29/2021 This 24 hour test was scanned by Leo Monge 3/31/2021 07:55. Tech comments:1. The rhythm was sinus. Average AR interval 0.16 average QRS duration 0.08.2. Diary returned with entry of \"panic attack while vaping nicotine, took clonazepam\" no ectopy noted. Confirmed by Josué [address] [place] [address] ([id number]) on 4/1/2021 3:16:10 PM    Hospital Outpatient Visit on 03/29/2021   Component Date Value    Left Ventricular Ejectio* 03/29/2021 58     LVEF MODALITY 03/29/2021 ECHO    Admission on 01/19/2021, Discharged on 01/19/2021   Component Date Value    Ventricular Rate 01/19/2021 113     Atrial Rate 01/19/2021 113     P-R Interval 01/19/2021 116     QRS Duration 01/19/2021 90     Q-T Interval 01/19/2021 304     QTc Calculation (Bazett) 01/19/2021 416     P Axis 01/19/2021 57     R Axis 01/19/2021 various other substances throughout lifetime     5.  RTC - 4 weeks       Anabelle Marlow, [address] [place] [address], Ne Nurse Practitioner

## 2021-05-09 RX ORDER — LISDEXAMFETAMINE DIMESYLATE 10 MG/1
10 CAPSULE ORAL DAILY
Qty: 30 CAPSULE | Refills: 0 | Status: SHIPPED | OUTPATIENT
Start: 2021-05-09 | End: 2021-06-07

## 2021-05-10 DIAGNOSIS — F39 MOOD DISORDER (HCC): ICD-10-CM

## 2021-05-10 LAB
A/G RATIO: 2.3 (ref 1.1–2.2)
ALBUMIN SERPL-MCNC: 5 G/DL (ref 3.4–5)
ALP BLD-CCNC: 83 U/L (ref 40–129)
ALT SERPL-CCNC: 27 U/L (ref 10–40)
ANION GAP SERPL CALCULATED.3IONS-SCNC: 14 MMOL/L (ref 3–16)
AST SERPL-CCNC: 23 U/L (ref 15–37)
BILIRUB SERPL-MCNC: <0.2 MG/DL (ref 0–1)
BUN BLDV-MCNC: 12 MG/DL (ref 7–20)
CALCIUM SERPL-MCNC: 9.6 MG/DL (ref 8.3–10.6)
CHLORIDE BLD-SCNC: 103 MMOL/L (ref 99–110)
CHOLESTEROL, TOTAL: 236 MG/DL (ref 0–199)
CO2: 23 MMOL/L (ref 21–32)
CREAT SERPL-MCNC: 0.8 MG/DL (ref 0.9–1.3)
GFR AFRICAN AMERICAN: >60
GFR NON-AFRICAN AMERICAN: >60
GLOBULIN: 2.2 G/DL
GLUCOSE BLD-MCNC: 95 MG/DL (ref 70–99)
HDLC SERPL-MCNC: 62 MG/DL (ref 40–60)
LDL CHOLESTEROL CALCULATED: 154 MG/DL
POTASSIUM SERPL-SCNC: 4.4 MMOL/L (ref 3.5–5.1)
SODIUM BLD-SCNC: 140 MMOL/L (ref 136–145)
TOTAL PROTEIN: 7.2 G/DL (ref 6.4–8.2)
TRIGL SERPL-MCNC: 101 MG/DL (ref 0–150)
TSH REFLEX FT4: 1.55 UIU/ML (ref 0.27–4.2)
VITAMIN D 25-HYDROXY: 27.9 NG/ML
VLDLC SERPL CALC-MCNC: 20 MG/DL

## 2021-05-11 LAB
ESTIMATED AVERAGE GLUCOSE: 99.7 MG/DL
HBA1C MFR BLD: 5.1 %

## 2021-06-07 ENCOUNTER — VIRTUAL VISIT (OUTPATIENT)
Dept: PSYCHIATRY | Age: 26
End: 2021-06-07
Payer: COMMERCIAL

## 2021-06-07 DIAGNOSIS — F90.2 ATTENTION DEFICIT HYPERACTIVITY DISORDER (ADHD), COMBINED TYPE: ICD-10-CM

## 2021-06-07 DIAGNOSIS — F41.1 GAD (GENERALIZED ANXIETY DISORDER): Primary | ICD-10-CM

## 2021-06-07 DIAGNOSIS — F39 MOOD DISORDER (HCC): ICD-10-CM

## 2021-06-07 DIAGNOSIS — F19.90 SUBSTANCE USE DISORDER: ICD-10-CM

## 2021-06-07 DIAGNOSIS — F40.01 AGORAPHOBIA WITH PANIC ATTACKS: ICD-10-CM

## 2021-06-07 PROCEDURE — G8427 DOCREV CUR MEDS BY ELIG CLIN: HCPCS | Performed by: NURSE PRACTITIONER

## 2021-06-07 PROCEDURE — G8420 CALC BMI NORM PARAMETERS: HCPCS | Performed by: NURSE PRACTITIONER

## 2021-06-07 PROCEDURE — 99214 OFFICE O/P EST MOD 30 MIN: CPT | Performed by: NURSE PRACTITIONER

## 2021-06-07 PROCEDURE — 4004F PT TOBACCO SCREEN RCVD TLK: CPT | Performed by: NURSE PRACTITIONER

## 2021-06-07 RX ORDER — ATOMOXETINE 40 MG/1
40 CAPSULE ORAL DAILY
Qty: 30 CAPSULE | Refills: 2 | Status: SHIPPED | OUTPATIENT
Start: 2021-06-07 | End: 2021-07-12

## 2021-06-07 RX ORDER — OLANZAPINE 5 MG/1
5 TABLET ORAL NIGHTLY
Qty: 30 TABLET | Refills: 2 | Status: SHIPPED | OUTPATIENT
Start: 2021-06-07 | End: 2021-07-12

## 2021-06-07 RX ORDER — FLUOXETINE HYDROCHLORIDE 20 MG/1
20 CAPSULE ORAL DAILY
Qty: 30 CAPSULE | Refills: 2 | Status: SHIPPED | OUTPATIENT
Start: 2021-06-07 | End: 2021-07-12 | Stop reason: SDUPTHER

## 2021-06-07 NOTE — PROGRESS NOTES
at times. Thinks that getting used to life without substances. Generally optimistic person. 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       Just don't feel like myself recently     Mother bipolar. Julius thought bipolar. Was previously dx \"way back when but was also before my adhd dx and I know they overlap or whatever\"     Mom has  not greatest person in the world. Don't care for the way he was talking to a child so I had to leave. Now i'm at my GMs. HPI:   Emir Peña is a 30 y.o. male with h/o BAD (he doesn't agree with dx); ADHD, anxiety, panic attacks, agoraphobia, SA, who was contacted via telehealth for follow up or to establish psychiatric services. Due to the COVID-19 pandemic restrictions on close contact interactions as recommended by CDC and health authorities, the patient's visit was conducted via telehealth (doxy. me video visit) in lieu of a face to face visit. Patient verbally consented and agreed to proceed. Verified the following information:  Patient's identification: Yes  Patient Aliyah [id number]  Patient's call back number:[phone]  Provider Location:  [place], [place]     Since last f/u VV 5/7/21      Filled clonazepam at 2 different pharmacies. One on 5/10, the other on 5/11. Was confirmed that pt did in fact  both these scripts at different pharmacies ([facility])      Today, good, busy. In training session  Working from home.  [employer]      Also started college classes online. [address] [employer] school. One class/month. Super easy            [place] [facility] was ok. Few things were hard. Getting on rollercoasters was hard. But glad I went and enjoyed time with family    Very unmotivated and sleepy. Don't know if meds or not    ADHD back in full force. Work from home is blessing but also hard.       When I went up on zyprexa, noticed increased appetite and daytime fatigue    Was having migraines almost daily x 2-3 weeks straight. Could have been activity (mostly inside looking at screen). None over past 2 days      Therapy:  Yamileth Anthony and saw someone week or 2 ago. Sign said provider would come out and get you. Waited 30 mins. Left 3 different vms. They still haven't gotten back to me    - needs to call [facility] and find another provider      Taking:     Clonazepam 1mg 1-2x/day (has approx 13-14 tabs left in current Rx)   prozac 20mg/day around 7-8am   zyprexa 5mg at hs (7p, to bed 8/9pm, up 7-8a)      Substance:   Alcohol:   DENIES RECENT  \"too scared to\"  Never really been my thing. Now with prozac, don't want to do anything that could interefer with it. Know terrible idea to take clonazepam with etoh                 Illicits:  - no longer using cbd gummy  - cannabis regular daily use since age 13.  last time was several months ago. Caused panic attack     - denies other illicits    \"a slew of things i've done like party drugs, cocaine 1-2x; acid when I was 15\"     \"got hooked on pain pills for a while. Was addicted during toxic relationship with baby momma\"                 Caffeine: occ caffeine-free pop. Can tell anxiety gets a little higher. May try to stop again. Restarted when feeling tired       Tobacco: vape \"here and there\". No cigarettes in several months. Can't recall how long      Psych ROS:      Depression: good mood, rates 6-8/10 (10 best); denies lability,     Sleep good, maybe too much, 10-12 hours. No daytime naps    No exercise. Take walks, that's it     Appetite good. Not excessive.       Concentration better with vyvanse     DENIES RECENT  guilt, hopelessnes, OR  Helplessness    \"FINE\" self esteem,     A LITTLE BIT difficulty with ADL completion (\"LESS THAN BEFORE\" );      GOOD interest (music)    Good energy until vyvanse wears off mid-day, lasting 6-7 hours, completely zoned out by end of day      DENIES RECENT Tearfulness; historically always been empathetic and cry-y. Denies isolation. Job takes up a lot of time. Is going out tonight to take daughter to [place]. Can drive now. Did fine recently driving 25 miles away. Less struggling to leave the house. Can be Challenging to walk into smaller spaces (gas station, other people's houses), may feel alittle pancky, get a ha or panic attack      DENIES SI/SH/HI        Janet: DENIES RECENT irritability; DENIES recent janet symptoms. + racing thoughts once vyanse wears off, helps me focus and center thoughts. intermittent intrusive thoughts (depending upon activity), worry of panic attack, not daily     LIFETIME HISTORY:  rapid speech, easily distracted or decreased attention, racing thoughts,     Have had episodes in past:  expansive mood,      DENIES PREVIOUS:  insomnia with increased energy,  increase in energy and goal directed behavior, grandiosity, flight of ideas        IMPULSIVITY:  denies recent   Historically Always impulsive. Walk out of jobs        Anxiety:  rates 5-6, was 8-10/10 (10 worst);     constant worry,  \"don't know, just general feeling, hard to explain. Think that relates to my constant toe curling thing\"    Worse if thinks about going places, or in certain environments; currently worried about mental health; worried about having panic attacks if leaves the house     -has driven recently.   Can drive alone     DENIES irritability,      sleep better    somatic complaints (toe curling, cracking neck d/t muscle tension; only heart racing, diaphoreis, feel like going to pass out if panic attack sets in)     DENIES restlessness,      SOMETIMES fatigue;      SOMETIMES avoidant of social situations     SORT OF fear of judgement \"when anxiety hits\", MORE SO WORRIED ABOUT PANIC ATTACKS     DENIES fear of doing/saying wrong things,      OCD:  DENIES Repetitive actions or rituals, excessive hand washing, contamination fears, need for symmetry, violent thoughts, hoarding, fear of being harmed, mental or verbal repetition of words or phrases and counting       Panic:  3-4/week, Situational.  Typically large crowds, going places on my own, being far from home or situations with certain people. Some better. Duration:  15-45 mins. anxious feeling under it can last all day      Endorses:  Shortness of breath, dizziness, diaphoresis, palpitations, hot flashes, chest discomfort and fear of dying     Phobias:  Recent fear of crowds/stores since stopping nicotine/caffeine/cannabis     Psychosis: DENIES A/VH, paranoia, delusions      ADHD:  Dx as adult, \"led myself to that diagnosis. Everything I looked up matched. \"    - symptoms improved with vyvanse but currently only lasting 6-7 hours. Denies se's.   adhd symptoms return mid-day once wears off     + difficulty sustaining attention      + easily distracted   + makes careless mistakes   + difficulty with task completion  + avoids or dislikes tasks requiring sustained mental effort    + forgetful in daily activities    + loses things necessary for tasks   + difficulty organizing       + fails to give close attention to details            + fidgets can never sit still, biting nails         + talks excessively   + difficulty waiting turn   + interrupts/intrudes          + history of ADHD symptoms or signs in elementary school            +history of academic performance problems            + history of educational psychology testing  (very young was told borderline, recommended retesting but never did)         PTSD: + trouble concentrating, denies recent nightmares,      DENIES flashbacks, hypervigilance, easily startled, reliving the event, avoiding situations that remind you of trauma, physical and mental paralysis when reminded of the experience, same despair, easily angry or irritable,  fear for safety,  Numbness of emotions, feeling of detachment     Eating disorders:  DENIES      QING 7 SCORE 10/11/2019   QING-7 Total Score 6     Interpretation of QING-7 score: 5-9 = mild anxiety, 10-14 = moderate anxiety,   15+ = severe anxiety. Recommend referral to behavioral health for scores 10 or greater. No data recorded  Interpretation of PHQ-9 score:  1-4 = minimal depression, 5-9 = mild depression,   10-14 = moderate depression; 15-19 = moderately severe depression, 20-27 = severe depression    Mood Disorder Questionnaire 1/14/21 (by pcp)    Positive Responses:  7/13  (7 or more  Yes responses to question 1, and Yes response to #2 and moderate to serious response to #3 considered positive screen for Bipolar Disorder)     Have several of these events happened during the same period of time? Yes     How much of a problem did any of these cause you? Moderate Problem        ASRS Scores 10/11/19 (by this provider):  ADHD screener results were positive. Inattentive:Part A - Total: 22  0-16 Unlikely  17-23 Likely  24+ Highly likely     Hyperactive/Impulsive: Part B - Total: 28  0-16 Unlikely  17-23 Likely  24+ Highly likely           Past Psychiatric History:               Prior hospitalizations: age [age] \"unruly, crazy with mom and stuff, lot of anger issues\"              Prior diagnoses: BAD (disagrees with this); ADHD              Outpatient Treatment:                           Psychiatrist: maybe one                          Therapist:  5-6, none currently              Suicide Attempts:  denies              Hx SH:  Cutting when younger, admits was attention setting; denies any recent or urges     Past Psychopharmacologic Trials (including response/reactions):     1.  Vraylar:  Increased anxiety  2. Vyvanse: Worked great when first took it. Switched to adderall d/t insurance change  3. Adderall:  Increased anxiety, Had panic attack  4. Prozac: At present. 5.  Clonazepam:  At present  6. Tramadol  7.   Wellbutrin:  Only used brief time       Past Medical/Surgical History:   Past Medical History:   Diagnosis Date    Anxiety     Bronchitis     Depression     Insomnia     Panic attacks     Pneumonia 2009    Spondylosis      Past Surgical History:   Procedure Laterality Date    APPENDECTOMY     Jamie Waldron Left     Dr. Marya Dakin. Medial meniscus tear    WISDOM TOOTH EXTRACTION         Family History   Problem Relation Age of Onset    Depression Mother     Liver Disease Mother         WRIGHT    Depression Sister     High Blood Pressure Other          PCP: HAMLET Bean - CNP      Allergies: Allergies   Allergen Reactions    Tramadol-Acetaminophen Nausea And Vomiting         Current Medications:   No current outpatient medications on file prior to visit. No current facility-administered medications on file prior to visit. Controlled Substance Monitoring:    Acute and Chronic Pain Monitoring:   RX Monitoring 6/7/2021   Attestation -   Periodic Controlled Substance Monitoring Potential drug abuse or diversion identified, see note documentation.      05/11/2021  4   05/10/2021  Clonazepam 1 MG Tablet  60.00  30 Ch Wet   [id number]   Elisa ([id number])   0  4.00 LME  [place]   05/10/2021  4   03/12/2021  Clonazepam 1 MG Tablet  60.00  30 Ch Wet   [id number]   Kro ([id number])   0  4.00 LME  [place]   05/10/2021  4   05/09/2021  Vyvanse 10 MG Capsule  30.00  30 Ch Wet   [id number]   Kro ([id number])   0   [place]   04/12/2021  4   04/05/2021  Clonazepam 1 MG Tablet  60.00  30 Ch Wet   [id number]   Elisa ([id number])   0  4.00 LME  [place]   03/14/2021  4   03/12/2021  Clonazepam 1 MG Tablet  60.00  30 Ch Wet   [id number]   Elisa ([id number])   0  4.00 LME  [place]icaid [place]   03/03/2021  4   03/03/2021  Clonazepam 1 MG Tablet  28.00  14 Ch Wet   [id number]   Kro ([id number])   0  4.00 LME  [place]   02/01/2021  5   02/01/2021  Clonazepam 0.5 MG Tablet  28.00  14 Mi Christian   [id number]   Kro ([id number])   0  2.00 LME  [place]   01/21/2021  5   01/21/2021  Clonazepam 1 MG Tablet  28.00  14 Cl Libby   [id number]   Kro ([id number])   0  4.00 LME  [place]   12/07/2020  4   12/07/2020 Dextroamp-Amphetamin 20 MG Tab  60.00  30 [facility]   [id number]   Wal (2005)   0   Comm Ins   [place]   08/05/2019  2   08/02/2019  Vyvanse 20 MG Capsule  60.00  30 [facility]   [id number]   Wal (2005)   0   [place]   07/18/2019  1   07/15/2019  Vyvanse 20 MG Capsule  30.00  30 [facility]   [id number]   Wal (2005)   0   [place]       OBJECTIVE:  Vitals: Wt Readings from Last 3 Encounters:   01/21/21 149 lb (67.6 kg)   01/19/21 147 lb 11.3 oz (67 kg)   01/14/21 147 lb 9.6 oz (67 kg)       There were no vitals filed for this visit. ROS: Denies trouble with fever, rash, headache, vision changes, chest pain, shortness of breath, nausea, extremity pain, weakness, dysuria.   \"pushing down my feet/toe curling a lot, ha's every now and again\"          Mental Status Exam:      Appearance    alert, cooperative, appropriate dress for season, appears stated age,   Muscle strength/tone: no atrophy or abnormal movements  Gait/station: not assessed during vv  Speech    spontaneous, normal rate, normal volume  Mood euthymic  Affect   Congruent to thought content and mood  Thought Content    excessive preoccupations, no delusions voiced  Thought Process   somewhat circumstantial and perservative, though improved from previous  Associations    logical connections  Perceptions: denies AH/VH, does not appear preoccupied with the internal environment  [address]  Orientation    oriented to person, place, time, and general circumstances  Memory    recent and remote memory intact  Attention/Concentration    impaired  Ability to understand instructions Yes  Ability to respond meaningfully Yes  Language: [address] of knowledge/Intellect: Average  SI:   no suicidal ideation  HI: Denies HI         Labs:     Orders Only on 05/10/2021   Component Date Value    Hemoglobin A1C 05/10/2021 5.1     eAG 05/10/2021 99.7     Cholesterol, Total 05/10/2021 236*    Triglycerides 05/10/2021 101     HDL 05/10/2021 62*    LDL Calculated 05/10/2021 154*    VLDL Cholesterol Calcula* 05/10/2021 20     Vit D, 25-Hydroxy 05/10/2021 27.9*    TSH Reflex FT4 05/10/2021 1.55     Sodium 05/10/2021 140     Potassium 05/10/2021 4.4     Chloride 05/10/2021 103     CO2 05/10/2021 23     Anion Gap 05/10/2021 14     Glucose 05/10/2021 95     BUN 05/10/2021 12     CREATININE 05/10/2021 0.8*    GFR Non- 05/10/2021 >60     GFR  05/10/2021 >60     Calcium 05/10/2021 9.6     Total Protein 05/10/2021 7.2     Albumin 05/10/2021 5.0     Albumin/Globulin Ratio 05/10/2021 2.3*    Total Bilirubin 05/10/2021 <0.2     Alkaline Phosphatase 05/10/2021 83     ALT 05/10/2021 27     AST 05/10/2021 23     Globulin 05/10/2021 2.2    Hospital Outpatient Visit on 03/29/2021   Component Date Value    Hookup Date 03/29/2021 Mar 29 2021      Hookup Time 03/29/2021 11:42:48     Acquisition Duration 03/29/2021 [id number]     Number Of QRS Complexes 03/29/2021 [id number]     Number Of Ventricular Ec* 03/29/2021 5     Number Of Ventricular Bi* 03/29/2021 0     Number Of Ventricular Co* 03/29/2021 0     Number Of Superventricul* 03/29/2021 1     Number Of Suptaventricul* 03/29/2021 1     Number Of Supraventricul* 03/29/2021 0     Average Heart Rate 03/29/2021 88     Holter Max Heart Rate 03/29/2021 163     Min Heart Rate 03/29/2021 56     Max Heart Rate Time/Date 03/29/2021 [id number]     Min Heart Rate Time/Date 03/29/2021 [id number]     Diagnosis 03/29/2021 This 24 hour test was scanned by Cephus Danagnieszka 3/31/2021 07:55. Tech comments:1. The rhythm was sinus. Average OK interval 0.16 average QRS duration 0.08.2. Diary returned with entry of \"panic attack while vaping nicotine, took clonazepam\" no ectopy noted. Confirmed by Josué, [address] [place] [address] ([id number]) on 4/1/2021 3:16:10 PM    Hospital Outpatient Visit on 03/29/2021   Component Date Value    Left Ventricular Ejectio* 03/29/2021 58     LVEF MODALITY 03/29/2021 ECHO Admission on 01/19/2021, Discharged on 01/19/2021   Component Date Value    Ventricular Rate 01/19/2021 113     Atrial Rate 01/19/2021 113     P-R Interval 01/19/2021 116     QRS Duration 01/19/2021 90     Q-T Interval 01/19/2021 304     QTc Calculation (Bazett) 01/19/2021 416     P Axis 01/19/2021 57     R Axis 01/19/2021 92     T Axis 01/19/2021 52     Diagnosis 01/19/2021 Sinus tachycardiaRightward axisOtherwise normal ECGWhen compared with ECG of 27-DEC-2020 18:40,No significant change was foundConfirmed by Yosef Wong MD, Martha Pacheco ([id number]) on 1/20/2021 6:45:20 PM     WBC 01/19/2021 9.7     RBC 01/19/2021 5.34     Hemoglobin 01/19/2021 16.2     Hematocrit 01/19/2021 47.7     MCV 01/19/2021 89.3     MCH 01/19/2021 30.3     MCHC 01/19/2021 34.0     RDW 01/19/2021 13.7     Platelets 97/37/1200 309     MPV 01/19/2021 8.4     Neutrophils % 01/19/2021 55.3     Lymphocytes % 01/19/2021 31.5     Monocytes % 01/19/2021 11.1     Eosinophils % 01/19/2021 1.2     Basophils % 01/19/2021 0.9     Neutrophils Absolute 01/19/2021 5.4     Lymphocytes Absolute 01/19/2021 3.1     Monocytes Absolute 01/19/2021 1.1     Eosinophils Absolute 01/19/2021 0.1     Basophils Absolute 01/19/2021 0.1     Sodium 01/19/2021 134*    Potassium reflex Magnesi* 01/19/2021 4.5     Chloride 01/19/2021 95*    CO2 01/19/2021 26     Anion Gap 01/19/2021 13     Glucose 01/19/2021 89     BUN 01/19/2021 20     CREATININE 01/19/2021 0.8*    GFR Non- 01/19/2021 >60     GFR  01/19/2021 >60     Calcium 01/19/2021 10.2     Troponin 01/19/2021 <0.01     D-Dimer, Quant 01/19/2021 <200    Admission on 12/27/2020, Discharged on 12/27/2020   Component Date Value    Ventricular Rate 12/27/2020 116     Atrial Rate 12/27/2020 116     P-R Interval 12/27/2020 126     QRS Duration 12/27/2020 94     Q-T Interval 12/27/2020 328     QTc Calculation (Bazett) 12/27/2020 455     P Axis 12/27/2020 59 history, family history and + MDQ. Pt in denial.  Doesn't believe can have BAD. Thinks symptoms primarily ADHD. Discussed how symptoms can be similar and often conditions co-exist.  Additionally, pt has used substances daily since age 13. Recently stopped cannabis, nicotine and caffeine several months ago per his report     - tried vraylar but endorses increased anxiety. ? If truly anxiety vs akathisia. Likely tried to increase dose too quickly, and could have gone much slower + add propanolol to see if this helped anxiety symptoms    - has had positive response to prozac + zyprexa.      -  continue prozac 20 mg/day for now. Questionable increase in irritability though thinks may be somewhat helpful. Family members have had positive response to this     -continue zyprexa 5mg nightly for mood/anxiety/sleep. Titrate as tolerated. Family member has had positive response to this     - WILL NOT continue to write for controlled substances. Pt had klonopin 1mg bid prn anxiety filled at two separate pharmacies (confirmed with each pharmacy) on 5/10/21 and 5/11/21. Today, admits he did  both scripts but denies knowing this was problematic. Pt does have h/o SA issues but he reports no non-prescribed meds in several months    Advised him today  liliya not continue to prescribe controlled subs which he acknowledges and agrees to. Will look for new prescriber. Resources sent as below.         - Genesight testing results reviewed previously.        B)  ADHD  - historically has only agreed with this diagnosis. Though states that adderall increased anxiety and thinks contributed to first panic attack     - mood/anxiety symptoms have stabilized since starting prozac/zyprexa.       - would like to consider retrial vyvanse as says did have positive response to this in the past and has less abuse potential d/t pharmacokinetics    - will not continue any controlled subs (see above)    DID HAVE partial response to vyvanse 10mg/day. - trial strattera 40mg/day. Titrate as tolerated   -Labs: reviewed in Epic NEEDS fasting lipids, hgba1c (starting zyprexa), would also add vit d and tsh and repeat cmp (prior mild hyponatremia and started ssri). Can be done at next in person visit              1/19/21:  Cbc wnl; bmp (mild hyponatremia)     -Recommend outpt therapy.     -OARRS reviewed, c/w history  -R/b/se/a d/w pt who consents.     3. Medical  -Following with HAMLET Kim - CNP  - was to have echo & 24 hour holter monitor 2/23/21, not completed.         4. Substance   -h/o cannabis use since age 13.  quit few months ago. H/o various other substances throughout lifetime     5. RTC - 4 weeks 7/12 @ 1230       Geena Perez, [address], [place] Nurse Practitioner          Medicaid Insurance:  [facility]: Intake/walk-in hours are M-F, 8:00am-12:00pm. Bring photo ID, proof of health coverage (if any), and proof of income to your first visit. Main office phone: [phone].  Main Office: [address] [facility], 2nd floor, [place], [place], [address] Office: [address], [address], [address] [facility] [address] [facility] Offices: [facility], [address] [facility] [address] [facility] Office: [address] [facility], [facility] [address] [facility],  [address] Office: [address], [address] [facility] [place] [address] Office: [address], [address] [facility] [address] [address] [address] Office: [address] [facility], [address] [facility] [address]      [address] [facility] House/[place]: Case management, mental health prevention, substance abuse therapy, housing assistance for Tammy Whyte, [place], Abbie De La Cruz, & "[employer]. [facility] [address], [address] [facility] [address]. [phone]. [facility]: Clinical assessment and counseling, including individual/group therapy, couples/family therapy, psychological testing, case management ([facility] residents), and psychiatric medication services.  For Medicaid patients only.  Call [phone] first to get started.    [address], [address] [facility] [address]  -Call: [phone]  -Multiple Locations  - Physicians [facility]   [address] [facility] [address] [id number], [place], [address] [facility]  [address] [place] [address] [address] [place] [address]. [address]  [phone]

## 2021-06-11 ENCOUNTER — VIRTUAL VISIT (OUTPATIENT)
Dept: PRIMARY CARE CLINIC | Age: 26
End: 2021-06-11
Payer: COMMERCIAL

## 2021-06-11 DIAGNOSIS — F41.1 GAD (GENERALIZED ANXIETY DISORDER): ICD-10-CM

## 2021-06-11 DIAGNOSIS — F90.0 ATTENTION DEFICIT HYPERACTIVITY DISORDER (ADHD), PREDOMINANTLY INATTENTIVE TYPE: ICD-10-CM

## 2021-06-11 DIAGNOSIS — I10 ESSENTIAL HYPERTENSION: ICD-10-CM

## 2021-06-11 DIAGNOSIS — F33.0 MILD EPISODE OF RECURRENT MAJOR DEPRESSIVE DISORDER (HCC): Primary | ICD-10-CM

## 2021-06-11 PROCEDURE — G8427 DOCREV CUR MEDS BY ELIG CLIN: HCPCS | Performed by: NURSE PRACTITIONER

## 2021-06-11 PROCEDURE — G8420 CALC BMI NORM PARAMETERS: HCPCS | Performed by: NURSE PRACTITIONER

## 2021-06-11 PROCEDURE — 99214 OFFICE O/P EST MOD 30 MIN: CPT | Performed by: NURSE PRACTITIONER

## 2021-06-11 PROCEDURE — 4004F PT TOBACCO SCREEN RCVD TLK: CPT | Performed by: NURSE PRACTITIONER

## 2021-06-11 RX ORDER — LISINOPRIL 5 MG/1
5 TABLET ORAL DAILY
Qty: 90 TABLET | Refills: 1 | Status: SHIPPED | OUTPATIENT
Start: 2021-06-11 | End: 2021-12-09

## 2021-06-11 SDOH — ECONOMIC STABILITY: FOOD INSECURITY: WITHIN THE PAST 12 MONTHS, THE FOOD YOU BOUGHT JUST DIDN'T LAST AND YOU DIDN'T HAVE MONEY TO GET MORE.: NEVER TRUE

## 2021-06-11 SDOH — ECONOMIC STABILITY: FOOD INSECURITY: WITHIN THE PAST 12 MONTHS, YOU WORRIED THAT YOUR FOOD WOULD RUN OUT BEFORE YOU GOT MONEY TO BUY MORE.: NEVER TRUE

## 2021-06-11 ASSESSMENT — ENCOUNTER SYMPTOMS
DIARRHEA: 0
ABDOMINAL PAIN: 0
CONSTIPATION: 0
SHORTNESS OF BREATH: 0
CHEST TIGHTNESS: 0

## 2021-06-11 ASSESSMENT — SOCIAL DETERMINANTS OF HEALTH (SDOH): HOW HARD IS IT FOR YOU TO PAY FOR THE VERY BASICS LIKE FOOD, HOUSING, MEDICAL CARE, AND HEATING?: NOT HARD AT ALL

## 2021-06-11 ASSESSMENT — VISUAL ACUITY: OU: 1

## 2021-06-11 NOTE — PROGRESS NOTES
[address] [facility] PRIMARY CARE  [facility] [address] [id number]  Dept: [phone]    2021   Restrictions on close contact interactions as recommended by CDC and health authorities, the patient's visit was conducted via telemedicine in lieu of a face to face visit. Patient verbally consented and agreed to proceed    Rahul Rizo (:  1995)is a 30 y.o. male, here for evaluation of the following medical concerns:    HPI  He was evaluated by Dr. Suzi Meredith for Palpitations in January, and evaluated by Branden Davidson CNP for anxiety/depression. Echo to evaluate heart was without abnormalities, 24 hour holter monitor revealed some tachycardia, no arrhthymias. Reports taking prescribed Zyprexa and Prozac. Complains of headaches, increased hunger and weight gain. He is requesting a second opinion of the Bipolar diagnosis. States his anxiety \"is under the surface. \"  He is on his way to a  of a family member, death due to an accidental overdose. Depression/ Anxiety  history of depression previously prescribed Prozac. He describes his anxiety as panic attacks, associated with shortness of breath and palpitations. He reports his mother has a history of Bipolar depression and ADHD. Evaluated in the emergency department in January with panic attack, heart rate ranging from 110's-130's. He reports isolation, which has increased since pandemic. He denies homicidal/suicidal ideations. He admits to taking the Prozac and Zyprexa which he states is effective. Followed by Branden Davidson CNP for mood disorder, was being prescribed Klonopin 1 mg as needed for anxiety. Per his report he picked up prescription early and she has discontinued prescribing Klonopin as well as Vyvanse. He is requesting PCP to prescribe Vyvanse. ADHD  Reports Branden Davidson has switched his Vyvanse to [employer]. He took last dose of Vyvanse today and is requesting a refill.  He does have the Strattera, has not been prescribed in the past. He would like to continue the Vyvanse because it has been effective in the past. Mother has a history of ADHD, prescribed medication. He is in the process of searching for a new Psychiatrist, having difficulty with those accepting his insurance. He is presently working from home 9-5:30 as a . He is able to care his daughter, less gas cost.    Hypertension  Last previous office visits his blood pressure has been elevated. Not previously treated for hypertension. denies headache, dizziness, chest pain or shortness of breath. reports his mother has a history of hypertension. BP Readings from Last 3 Encounters:   01/21/21 (!) 175/90   01/19/21 (!) 140/89   01/14/21 (!) 148/107     Lab Results   Component Value Date    CHOL 236 (H) 05/10/2021     Lab Results   Component Value Date    TRIG 101 05/10/2021     Lab Results   Component Value Date    HDL 62 (H) 05/10/2021     Lab Results   Component Value Date    LDLCALC 154 (H) 05/10/2021     Lab Results   Component Value Date    LABVLDL 20 05/10/2021     Lab Results   Component Value Date    WBC 9.7 01/19/2021    HGB 16.2 01/19/2021    HCT 47.7 01/19/2021    MCV 89.3 01/19/2021     01/19/2021     Lab Results   Component Value Date     05/10/2021    K 4.4 05/10/2021     05/10/2021    CO2 23 05/10/2021    BUN 12 05/10/2021    CREATININE 0.8 (L) 05/10/2021    GLUCOSE 95 05/10/2021    CALCIUM 9.6 05/10/2021    PROT 7.2 05/10/2021    LABALBU 5.0 05/10/2021    BILITOT <0.2 05/10/2021    ALKPHOS 83 05/10/2021    AST 23 05/10/2021    ALT 27 05/10/2021    LABGLOM >60 05/10/2021    GFRAA >60 05/10/2021    AGRATIO 2.3 (H) 05/10/2021    GLOB 2.2 05/10/2021       Review of Systems   Constitutional: Negative for activity change and fever. HENT: Negative for congestion. Eyes: Negative for visual disturbance. Respiratory: Negative for chest tightness and shortness of breath.     Cardiovascular: Negative for chest pain, palpitations and leg swelling. Gastrointestinal: Negative for abdominal pain, constipation and diarrhea. Endocrine: Negative for polyuria. Genitourinary: Negative for dysuria. Musculoskeletal: Negative for arthralgias and myalgias. Skin: Negative for rash. Neurological: Negative for dizziness, light-headedness and headaches. Psychiatric/Behavioral: Negative for agitation, decreased concentration and sleep disturbance. The patient is not nervous/anxious. Prior to Visit Medications    Medication Sig Taking? Authorizing Provider   lisinopril (PRINIVIL;ZESTRIL) 5 MG tablet Take 1 tablet by mouth daily Yes Mitchell Gil, APRN - CNP   FLUoxetine (PROZAC) 20 MG capsule Take 1 capsule by mouth daily Yes Jame Quintana APRN - CNP   OLANZapine (ZYPREXA) 5 MG tablet Take 1 tablet by mouth nightly Yes Jame Quintana, APRN - CNP   atomoxetine (STRATTERA) 40 MG capsule Take 1 capsule by mouth daily  Jame Quintana, APRN - CNP        Social History     Tobacco Use    Smoking status: Current Some Day Smoker     Packs/day: 0.25     Years: 8.00     Pack years: 2.00     Types: Cigarettes     Start date: 2007     Last attempt to quit: 7/3/2019     Years since quittin.9    Smokeless tobacco: Never Used    Tobacco comment: using E cig   Substance Use Topics    Alcohol use: No     Alcohol/week: 0.0 standard drinks     Comment: occ        There were no vitals filed for this visit. Estimated body mass index is 23.34 kg/m² as calculated from the following:    Height as of 21: 5' 7\" (1.702 m). Weight as of 21: 149 lb (67.6 kg). Physical Exam  Constitutional:       Appearance: Normal appearance. He is well-developed and well-groomed. HENT:      Head: Normocephalic. Right Ear: Hearing normal.      Left Ear: Hearing normal.   Eyes:      General: Lids are normal. Vision grossly intact.    Pulmonary:      Effort: Pulmonary effort is normal. Neurological:      General: No focal deficit present. Mental Status: He is alert and oriented to person, place, and time. GCS: GCS eye subscore is 4. GCS verbal subscore is 5. GCS motor subscore is 6. Psychiatric:         Attention and Perception: Attention normal.         Mood and Affect: Mood normal.         Speech: Speech normal.         Behavior: Behavior is cooperative. Cognition and Memory: Cognition and memory normal.         ASSESSMENT/PLAN:  1. Mild episode of recurrent major depressive disorder ([facility] [address]  -continue taking Prozac 20 mg daily  -Continue taking Olanzapine 5 mg daily  -Continue follow up with [facility] until established with new Psychiatrist     2. Attention deficit hyperactivity disorder (ADHD), predominantly inattentive type  -Start taking the Strattera 40 mg daily as prescribed  Continue follow up with Kin[facility] Knight until established with new Psychiatrist     3. QING (generalized anxiety disorder)  --continue taking Prozac 20 mg daily  -Continue taking Olanzapine 5 mg daily  -Continue follow up with Kinsey Knight until established with new Psychiatrist     4. Essential hypertension-Newly diagnosed, last 4 blood pressure readings in chart 140-175/. FH of hypertension  -Start taking Lisinopril 5 mg daily      Return in about 4 weeks (around 7/9/2021) for HTN, ANXIETY. Reviewed patient's pertinent medical history, relevant laboratory results, imaging studies, and health maintenance. Medications have been reviewed and discussed with the patient, refills otherwise up-to-date. Discussed the importance of adhering to current medication regimen. Advised:  (1) continue to work on eating a healthy balanced diet; (2) stay active by exercising within your personal limits. Patient was advised to keep future appointments with their respective specialty care team(s).  Questions and concerns addressed, care plan reviewed and patient is agreeable with the care plan following today's visit. Rahul Rizo, was evaluated through a synchronous (real-time) audio-video encounter. The patient (or guardian if applicable) is aware that this is a billable service. Verbal consent to proceed has been obtained within the past 12 months. The visit was conducted pursuant to the emergency declaration under the [address] [place] [address], [address] [place] [address] and the [employer] General Act. Patient identification was verified, and a caregiver was present when appropriate. The patient was located in a state where the provider was credentialed to provide care. Total time spent for this encounter: Not billed by time    --HAMLET Coley CNP on 6/11/2021 at 8:30 AM    An electronic signature was used to authenticate this note.         --HAMLET Coley CNP on 6/11/2021 at 8:24 AM

## 2021-07-05 NOTE — PROGRESS NOTES
[address] [facility] PRIMARY CARE  [facility] [address] [id number]  Dept: [phone]    2021     Tanner Seals (:  1995)is a 27 y.o. male, here for evaluation of the following medical concerns:    Hypertension  This is a chronic problem. The current episode started 1 to 4 weeks ago. The problem is controlled. Pertinent negatives include no chest pain, headaches, palpitations or shortness of breath. Risk factors for coronary artery disease include male gender. Past treatments include ACE inhibitors. The current treatment provides significant improvement. Compliance problems include diet and exercise. There is no history of angina, CAD/MI or retinopathy. Depression/ Anxiety  history of depression previously prescribed Prozac. He describes his anxiety as panic attacks, associated with shortness of breath and palpitations. He reports his mother has a history of Bipolar depression and ADHD. Evaluated in the emergency department in January with panic attack, heart rate ranging from 110's-130's. He reports isolation, which has increased since pandemic. He denies homicidal/suicidal ideations. He admits to taking the Prozac and Zyprexa which he states is effective. Followed by Ike Durbin, CNP for mood disorder, was being prescribed Klonopin 1 mg as needed for anxiety. Per his report he picked up prescription early and she has discontinued prescribing Klonopin as well as Vyvanse. He is requesting PCP to prescribe Vyvanse.       ADHD  Reports Ike Durbin has switched his Vyvanse to Strattera. He is taking the Strattera, although he feels the Vyvanse was more effective. Mother has a history of ADHD, prescribed medication. He is in the process of searching for a new Psychiatrist, having difficulty with those accepting his insurance.      Uses a Vape with Nicotine.    Lab Results   Component Value Date    CHOL 236 (H) 05/10/2021     Lab Results   Component Value Date    TRIG 101 05/10/2021     Lab Results   Component Value Date    HDL 62 (H) 05/10/2021     Lab Results   Component Value Date    LDLCALC 154 (H) 05/10/2021     Lab Results   Component Value Date    LABVLDL 20 05/10/2021     Lab Results   Component Value Date    WBC 9.7 01/19/2021    HGB 16.2 01/19/2021    HCT 47.7 01/19/2021    MCV 89.3 01/19/2021     01/19/2021     Lab Results   Component Value Date     05/10/2021    K 4.4 05/10/2021     05/10/2021    CO2 23 05/10/2021    BUN 12 05/10/2021    CREATININE 0.8 (L) 05/10/2021    GLUCOSE 95 05/10/2021    CALCIUM 9.6 05/10/2021    PROT 7.2 05/10/2021    LABALBU 5.0 05/10/2021    BILITOT <0.2 05/10/2021    ALKPHOS 83 05/10/2021    AST 23 05/10/2021    ALT 27 05/10/2021    LABGLOM >60 05/10/2021    GFRAA >60 05/10/2021    AGRATIO 2.3 (H) 05/10/2021    GLOB 2.2 05/10/2021       Review of Systems   Constitutional: Negative for activity change and fever. HENT: Negative for congestion. Eyes: Negative for visual disturbance. Respiratory: Negative for chest tightness and shortness of breath. Cardiovascular: Negative for chest pain, palpitations and leg swelling. Hypertension   Gastrointestinal: Negative for abdominal pain, constipation and diarrhea. Endocrine: Negative for polyuria. Genitourinary: Negative for dysuria. Musculoskeletal: Negative for arthralgias and myalgias. Skin: Negative for rash. Neurological: Negative for dizziness, light-headedness and headaches. Psychiatric/Behavioral: Negative for agitation, decreased concentration and sleep disturbance. The patient is not nervous/anxious. Anxiety/depression  Panic attacks       Prior to Visit Medications    Medication Sig Taking?  Authorizing Provider   lisinopril (PRINIVIL;ZESTRIL) 5 MG tablet Take 1 tablet by mouth daily Yes Merlene Villela APRN - CNP   atomoxetine (STRATTERA) 40 MG capsule Take 1 capsule by mouth daily Yes HAMLET Bell - EMMETT   FLUoxetine (PROZAC) 20 MG capsule Take 1 capsule by mouth daily Yes HAMLET Malagon CNP   OLANZapine (ZYPREXA) 5 MG tablet Take 1 tablet by mouth nightly Yes HAMLET Malagon CNP      Past Medical History:   Diagnosis Date    Anxiety     Bronchitis     Depression     Insomnia     Panic attacks     Pneumonia 2009    Spondylosis      Social History     Tobacco Use    Smoking status: Current Some Day Smoker     Packs/day: 0.25     Years: 8.00     Pack years: 2.00     Types: Cigarettes     Start date: 2007     Last attempt to quit: 7/3/2019     Years since quittin.0    Smokeless tobacco: Never Used    Tobacco comment: using E cig   Substance Use Topics    Alcohol use: No     Alcohol/week: 0.0 standard drinks     Comment: occ        Vitals:    21 1024   BP: 121/86   Pulse: 85   Temp: 97.2 °F (36.2 °C)   TempSrc: Temporal   SpO2: 99%   Weight: 165 lb (74.8 kg)   Height: 5' 7\" (1.702 m)     Estimated body mass index is 25.84 kg/m² as calculated from the following:    Height as of this encounter: 5' 7\" (1.702 m). Weight as of this encounter: 165 lb (74.8 kg). Physical Exam  Vitals reviewed. Constitutional:       Appearance: He is well-developed. He is not diaphoretic. HENT:      Head: Normocephalic. Right Ear: Hearing and external ear normal. No tenderness. Left Ear: Hearing and external ear normal. No tenderness. Nose: Nose normal.   Eyes:      General: Lids are normal.   Cardiovascular:      Rate and Rhythm: Normal rate and regular rhythm. Heart sounds: Normal heart sounds, S1 normal and S2 normal.   Pulmonary:      Effort: Pulmonary effort is normal.      Breath sounds: Normal breath sounds. Musculoskeletal:         General: Normal range of motion. Lymphadenopathy:      Head:      Right side of head: No submental or submandibular adenopathy. Left side of head: No submental or submandibular adenopathy.       Cervical:      Right cervical: No superficial cervical adenopathy. Left cervical: No superficial cervical adenopathy. Skin:     General: Skin is warm and dry. Findings: No rash. Nails: There is no clubbing. Neurological:      Mental Status: He is alert and oriented to person, place, and time. GCS: GCS eye subscore is 4. GCS verbal subscore is 5. GCS motor subscore is 6. Psychiatric:         Speech: Speech normal.         Behavior: Behavior normal. Behavior is cooperative. Judgment: Judgment normal.         ASSESSMENT/PLAN:  1. Mild episode of recurrent major depressive disorder (HCC)  -continue Fluoxetine 20 mg daily  -continue olanzapine 5 mg daily  - [facility]    2. QING (generalized anxiety disorder)  --continue Fluoxetine 20 mg daily  -continue olanzapine 5 mg daily  - [facility]    3. Attention deficit hyperactivity disorder (ADHD), predominantly inattentive type  -Continue Strattera 40 mg daily  - [facility]    4. Essential hypertension  -Continue Lisinopril 5 mg daily      Return in about 6 months (around 1/6/2022) for HTN. Reviewed patient's pertinent medical history, relevant laboratory results, imaging studies, and health maintenance. Medications have been reviewed and discussed with the patient, refills otherwise up-to-date. Discussed the importance of adhering to current medication regimen. Advised:  (1) continue to work on eating a healthy balanced diet; (2) stay active by exercising within your personal limits. Patient was advised to keep future appointments with their respective specialty care team(s). Questions and concerns addressed, care plan reviewed and patient is agreeable with the care plan following today's visit.     --HAMLET Vee - CNP on 7/6/2021 at 11:36 AM

## 2021-07-06 ENCOUNTER — OFFICE VISIT (OUTPATIENT)
Dept: PRIMARY CARE CLINIC | Age: 26
End: 2021-07-06
Payer: COMMERCIAL

## 2021-07-06 VITALS
DIASTOLIC BLOOD PRESSURE: 86 MMHG | HEART RATE: 85 BPM | HEIGHT: 67 IN | SYSTOLIC BLOOD PRESSURE: 121 MMHG | OXYGEN SATURATION: 99 % | WEIGHT: 165 LBS | TEMPERATURE: 97.2 F | BODY MASS INDEX: 25.9 KG/M2

## 2021-07-06 DIAGNOSIS — F90.0 ATTENTION DEFICIT HYPERACTIVITY DISORDER (ADHD), PREDOMINANTLY INATTENTIVE TYPE: ICD-10-CM

## 2021-07-06 DIAGNOSIS — F33.0 MILD EPISODE OF RECURRENT MAJOR DEPRESSIVE DISORDER (HCC): Primary | ICD-10-CM

## 2021-07-06 DIAGNOSIS — F41.1 GAD (GENERALIZED ANXIETY DISORDER): ICD-10-CM

## 2021-07-06 DIAGNOSIS — I10 ESSENTIAL HYPERTENSION: ICD-10-CM

## 2021-07-06 PROCEDURE — G8419 CALC BMI OUT NRM PARAM NOF/U: HCPCS | Performed by: NURSE PRACTITIONER

## 2021-07-06 PROCEDURE — G8427 DOCREV CUR MEDS BY ELIG CLIN: HCPCS | Performed by: NURSE PRACTITIONER

## 2021-07-06 PROCEDURE — 4004F PT TOBACCO SCREEN RCVD TLK: CPT | Performed by: NURSE PRACTITIONER

## 2021-07-06 PROCEDURE — 99214 OFFICE O/P EST MOD 30 MIN: CPT | Performed by: NURSE PRACTITIONER

## 2021-07-06 ASSESSMENT — ENCOUNTER SYMPTOMS
SHORTNESS OF BREATH: 0
CHEST TIGHTNESS: 0
DIARRHEA: 0
CONSTIPATION: 0
ABDOMINAL PAIN: 0

## 2021-07-12 ENCOUNTER — VIRTUAL VISIT (OUTPATIENT)
Dept: PSYCHIATRY | Age: 26
End: 2021-07-12
Payer: COMMERCIAL

## 2021-07-12 DIAGNOSIS — F19.90 SUBSTANCE USE DISORDER: ICD-10-CM

## 2021-07-12 DIAGNOSIS — F90.2 ATTENTION DEFICIT HYPERACTIVITY DISORDER (ADHD), COMBINED TYPE: Primary | ICD-10-CM

## 2021-07-12 DIAGNOSIS — F39 MOOD DISORDER (HCC): ICD-10-CM

## 2021-07-12 DIAGNOSIS — F41.1 GAD (GENERALIZED ANXIETY DISORDER): ICD-10-CM

## 2021-07-12 DIAGNOSIS — F40.01 AGORAPHOBIA WITH PANIC ATTACKS: ICD-10-CM

## 2021-07-12 PROCEDURE — 4004F PT TOBACCO SCREEN RCVD TLK: CPT | Performed by: NURSE PRACTITIONER

## 2021-07-12 PROCEDURE — G8419 CALC BMI OUT NRM PARAM NOF/U: HCPCS | Performed by: NURSE PRACTITIONER

## 2021-07-12 PROCEDURE — 99213 OFFICE O/P EST LOW 20 MIN: CPT | Performed by: NURSE PRACTITIONER

## 2021-07-12 PROCEDURE — G8427 DOCREV CUR MEDS BY ELIG CLIN: HCPCS | Performed by: NURSE PRACTITIONER

## 2021-07-12 RX ORDER — ATOMOXETINE 80 MG/1
80 CAPSULE ORAL DAILY
Qty: 30 CAPSULE | Refills: 2 | Status: SHIPPED
Start: 2021-07-12 | End: 2021-08-05 | Stop reason: SINTOL

## 2021-07-12 RX ORDER — OLANZAPINE 2.5 MG/1
2.5 TABLET ORAL NIGHTLY
Qty: 30 TABLET | Refills: 2 | Status: SHIPPED | OUTPATIENT
Start: 2021-07-12 | End: 2021-07-23

## 2021-07-12 RX ORDER — FLUOXETINE HYDROCHLORIDE 20 MG/1
20 CAPSULE ORAL DAILY
Qty: 30 CAPSULE | Refills: 2 | Status: SHIPPED | OUTPATIENT
Start: 2021-07-12 | End: 2021-11-05

## 2021-07-12 NOTE — PROGRESS NOTES
PSYCHIATRY PROGRESS NOTE    Barbara Langston  1995 7/12/21    Face to Face time via doxy. me  Text/email invite sent: [id number]  Start time: 1236p  End time: 1254p    CC:   Chief Complaint   Patient presents with    Follow-up     Per excerpt of re-establishment visit  completed by this provider on 2/12/21:    Pt known to this provider. Had seen x 1 > 1 year ago, 10/11/19.   Then lost to f/u     Per excerpt of pcp note dated 1/14/21:     \"HPI  69-year-old white male with chart history of depression with suicidal attempts that he describes as a tension getting, previously prescribed Prozac, ADHD previously prescribed Adderall, with family history of bipolar affective disorder and ADHD (mother) who presents with several month history of increasing anxiety, decreased sleep, nightmares revolving around death or threat of death to himself or loved ones. [facility] describes his anxiety as panic attacks, associated with shortness of breath palpitations subjective tachycardia (recently in the ED with EKG demonstrating a sinus tachycardia) sweating nausea dry mouth.  When he is not feeling panicky, he is worried about losing control in front of other people, and has socially isolated himself even more than required by Covid endemic.  Denies intrusive imaging, during these spells.  He denies witnessing or hearing of a loved 1 suffering a horrifically traumatic event.  Not a combat .     He describes worrying about number of things, such as his health his career finances his daughter the state of the nation climate change.  Prosper equals 18.  PHQ 15.     He endorses running up credit card debt, gambling, but not high-speed reckless driving, staying up days on end. [facility] disorder questionnaire 7 yeses, some occurring at same time, causing moderate problem with his social roles.       Urine drug screen negative except for Adderall in 2019, TSH normal at the time.     Within the past month, using his victorina, he stopped smoking, drinking heavy caffeine, stop smoking marijuana.  He weaned himself off Adderall.  Denies alcohol abuse.  Previously smoked a pack per day cigarettes, 2 g/day marijuana.  Denies methamphetamine or cocaine use.  Denies use of antipsychotics other than a couple of LSD trips in his teens. \"     Additionally per chart review, was tried on vraylar. This increased anxiety     Today, reports \"get panic attacks\"  \"Now that i've got medicaid, can afford to take care of myself\"     Reports anxiety/panic attacks started happening \"when I quit smoking weed, caffeine and nicotine 45 days ago. Stopped because \"wanted to be a better me\". Realized was self medicating. So started having panic attacks. Went back to caffeine and nicotine. Was causing anxiety so stopped again     First time ever had panic attack was when took adderall. Now caffeine makes it bad, nicotine makes it bad.     Now having panic attacks when in public places. Has other people go to store for me.     Constantly pushing my feet downwards. Almost like don't have control. Flexing downwards becase of stress/anxious     Been on prozac for week and half. Maybe helps a little. Know it takes a while. Maybe more irritable     Clonazepam helps a lot. Know don't want to rely on it.  i'm already out. Julius gave me 1mg bid. Irmabianca Kendall made me super anxious so was taking 2/day.     When roland gave them to me, gave me 0.5mg. Noticed wasn't as strong, not as effective     Really weird. Used to skateboard in middle of road. Now scared to go into [employer] to get groceries.     Has full custody of 10 y/o daughter, want to be best me I can be.       Drank so much caffeine one day, my resting hr was 170. Scheduled echo and holter for further eval.  No longer checking hr regularly. Seems has calmed down since nicotine and caffeine are out of my life        Get sad from time to time. Generally happy though. If I described myself, i'm typically happy.   Get easily agitated at times. Thinks that getting used to life without substances. Generally optimistic person. 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       Just don't feel like myself recently     Mother bipolar. Julius thought bipolar. Was previously dx \"way back when but was also before my adhd dx and I know they overlap or whatever\"     Mom has  not greatest person in the world. Don't care for the way he was talking to a child so I had to leave. Now i'm at my s. HPI:   Radha Govea is a 27 y.o. male with h/o BAD (he doesn't agree with dx); ADHD, anxiety, panic attacks, agoraphobia, SA, who was contacted via telehealth for follow up psychiatric services. Due to the COVID-19 pandemic restrictions on close contact interactions as recommended by CDC and health authorities, the patient's visit was conducted via telehealth (doxy. me video visit) in lieu of a face to face visit. Patient verbally consented and agreed to proceed. Verified the following information:  Patient's identification: Yes  Patient Aliyah [id number]  Patient's call back number:[phone]  Provider Location:  [place], [place]     Since last f/u VV 6/7/21    Was informed at last visit this provider would no longer continue to write for controlled substances after he knowingly filled clonazepam at 2 different pharmacies. One on 5/10, the other on 5/11. Was confirmed that pt did in fact  both these scripts at different pharmacies ([facility] and [facility])      Today, \"is good\"    Employment:  Working from home.  [employer]    Education:  college classes online. Going well. They gave me [facility] as part of tuition. Really nice. [address] [employer] school. One class/month. Getting into the real classes. Housing:  With . Daughter living there also. Therapy:  None at present. Trying to find provider. Difficult with insurance  Left 3 different vms.   They still haven't gotten back to me      Taking 7/12/21:     strattera 40mg in am   prozac 20mg in am   Lisinopril 5mg in am   zyprexa 5mg at hs (took 2.5mg last night d/t drowsy during day)   mvi inconsistently      Substance:   Alcohol:   tried a drink with dad couple weeks ago, didn't care for it   historically  \"too scared to\"  Never really been my thing. Now with prozac, don't want to do anything that could interefer with it. Know terrible idea to take clonazepam with etoh                   Illicits:  - no longer using cbd gummy  - cannabis regular daily use since age 13.  last time was several months ago. Caused panic attack     - denies other illicits    \"a slew of things i've done like party drugs, cocaine 1-2x; acid when I was 15\"     \"got hooked on pain pills for a while. Was addicted during toxic relationship with baby momma\"                 Caffeine: 1-2 soda/day. Doesn't feel  anxiety gets higher now since off stimulants and on strattera. May try to stop again. Restarted when feeling tired       Tobacco: vape \"here and there\". No cigarettes in several months. Can't recall how long      Psych ROS:      Depression: good mood, rates 7/10 (10 best); denies lability,    Denies irritability     Sleep good, maybe too much, 10-12 hours/night. Last night took 1/2 olanzapine. Feeling less sluggish today    Exercise = take walks, that's it. Wants to get back to [employer]     Appetite good. Balanced now. Not as hungry on strattera    Concentration poor despite strattera. Notices inattentiveness and impulsivness     DENIES RECENT guilt, hopelessnes, OR  Helplessness    \"good\" self esteem,     A LITTLE BIT difficulty with ADL completion (\"LESS THAN BEFORE\" ); Thinks part of ADHD thing     GOOD interest (music)    Low energy, tired longer than should be. Takes longer to wake up.   Not as difficult with taking 1/2 tab zyprexa last night      DENIES RECENT Tearfulness; historically always been empathetic and cry-y. Denies isolation. But working from home and college from home. Can drive now. Less struggling to leave the house. Can be Challenging to walk into smaller spaces (gas station, other people's houses), may feel alittle pancky,\"try to control them\"       DENIES SI/SH/HI    Denies guns or weapons at home       Janet: DENIES RECENT irritability; DENIES recent janet symptoms. + racing thoughts once vyanse wears off, helps me focus and center thoughts. intermittent intrusive thoughts (depending upon activity), worry of panic attack, not daily     LIFETIME HISTORY:  rapid speech, easily distracted or decreased attention, racing thoughts,     Have had episodes in past:  expansive mood,      DENIES PREVIOUS:  insomnia with increased energy,  increase in energy and goal directed behavior, grandiosity, flight of ideas        IMPULSIVITY:  pull up video game, google search something   Historically Always impulsive. Walk out of jobs          Anxiety:  rates 6-7 (10 worst); \"doesn't fully go away\"    constant worry,  \"don't know, just general feeling, hard to explain. Think that relates to my constant toe curling thing\"    Worse if thinks about going places, or in certain environments; currently worried about mental health; worried about having panic attacks if leaves the house     -has driven recently.   Can drive alone     DENIES irritability,      sleep better    somatic complaints (LESS toe curling, cracking neck d/t muscle tension; only heart racing, diaphoreis, feel like going to pass out if panic attack sets in)     DENIES restlessness,      SOMETIMES fatigue;      SOMETIMES avoidant of social situations     SORT OF fear of judgement \"when anxiety hits\", MORE SO WORRIED ABOUT PANIC ATTACKS     DENIES fear of doing/saying wrong things,      OCD:  DENIES Repetitive actions or rituals, excessive hand washing, contamination fears, need for symmetry, violent thoughts, hoarding, fear of being harmed, mental or verbal repetition of words or phrases and counting       Panic:  5-10/week, Starting to become spontaneous. But always when out. Situational. Typically large crowds, going places on my own, being far from home or situations with certain people. Some better. Had one while driving recently, when had been fine for while driving    Duration:  15-45 mins. anxious feeling under it can last all day      Endorses:  Shortness of breath, dizziness, diaphoresis, palpitations, hot flashes, chest discomfort and fear of dying     Phobias:  Recent fear of crowds/stores since stopping nicotine/caffeine/cannabis     Psychosis: DENIES A/VH, paranoia, delusions      ADHD:  Dx as adult, \"led myself to that diagnosis. Everything I looked up matched. \"    - symptoms improved with vyvanse but currently only lasting 6-7 hours. Denies se's.   adhd symptoms return mid-day once wears off     + difficulty sustaining attention      + easily distracted   + makes careless mistakes   + difficulty with task completion  + avoids or dislikes tasks requiring sustained mental effort    + forgetful in daily activities    + loses things necessary for tasks   + difficulty organizing       + fails to give close attention to details            + fidgets can never sit still, biting nails         + talks excessively   + difficulty waiting turn   + interrupts/intrudes          + history of ADHD symptoms or signs in elementary school            +history of academic performance problems            + history of educational psychology testing  (very young was told borderline, recommended retesting but never did)         PTSD: + trouble concentrating,    denies recent nightmares,      DENIES flashbacks, hypervigilance, easily startled, reliving the event, avoiding situations that remind you of trauma, physical and mental paralysis when reminded of the experience, same despair, easily angry or irritable,  fear for safety,  Numbness of emotions, feeling of detachment     Eating disorders:  DENIES      QING 7 SCORE 10/11/2019   QING-7 Total Score 6     Interpretation of QING-7 score: 5-9 = mild anxiety, 10-14 = moderate anxiety,   15+ = severe anxiety. Recommend referral to behavioral health for scores 10 or greater. No data recorded  Interpretation of PHQ-9 score:  1-4 = minimal depression, 5-9 = mild depression,   10-14 = moderate depression; 15-19 = moderately severe depression, 20-27 = severe depression    Mood Disorder Questionnaire 1/14/21 (by pcp)    Positive Responses:  7/13  (7 or more  Yes responses to question 1, and Yes response to #2 and moderate to serious response to #3 considered positive screen for Bipolar Disorder)     Have several of these events happened during the same period of time? Yes     How much of a problem did any of these cause you? Moderate Problem        ASRS Scores 10/11/19 (by this provider):  ADHD screener results were positive. Inattentive:Part A - Total: 22  0-16 Unlikely  17-23 Likely  24+ Highly likely     Hyperactive/Impulsive: Part B - Total: 28  0-16 Unlikely  17-23 Likely  24+ Highly likely           Past Psychiatric History:               Prior hospitalizations: age [age] \"unruly, crazy with mom and stuff, lot of anger issues\"              Prior diagnoses: BAD (disagrees with this); ADHD              Outpatient Treatment:                           Psychiatrist: maybe one                          Therapist:  5-6, none currently              Suicide Attempts:  denies              Hx SH:  Cutting when younger, admits was attention setting; denies any recent or urges     Past Psychopharmacologic Trials (including response/reactions):     1.  Vraylar:  Increased anxiety  2. Vyvanse: Worked great when first took it. Switched to adderall d/t insurance change  3. Adderall:  Increased anxiety, Had panic attack  4. Prozac: At present. 5.  Clonazepam:  At present  6. Tramadol  7.   Wellbutrin:  Only used brief time       Past Medical/Surgical History:   Past Medical History:   Diagnosis Date    Anxiety     Bronchitis     Depression     Insomnia     Panic attacks     Pneumonia 2009    Spondylosis      Past Surgical History:   Procedure Laterality Date    APPENDECTOMY     Doe File Left     Dr. Presley Garcia. Medial meniscus tear    WISDOM TOOTH EXTRACTION         Family History   Problem Relation Age of Onset    Depression Mother     Liver Disease Mother         WRIGHT    Depression Sister     High Blood Pressure Other          PCP: HAMLET Ray CNP      Allergies: Allergies   Allergen Reactions    Tramadol-Acetaminophen Nausea And Vomiting         Current Medications:   Current Outpatient Medications on File Prior to Visit   Medication Sig Dispense Refill    lisinopril (PRINIVIL;ZESTRIL) 5 MG tablet Take 1 tablet by mouth daily 90 tablet 1     No current facility-administered medications on file prior to visit. Controlled Substance Monitoring:    Acute and Chronic Pain Monitoring:   RX Monitoring 7/12/2021   Attestation -   Periodic Controlled Substance Monitoring Potential drug abuse or diversion identified, see note documentation.      05/11/2021  4   05/10/2021  Clonazepam 1 MG Tablet  60.00  30 Ch Wet   [id number]   Elisa ([id number])   0  4.00 LME  [place]   05/10/2021  4   05/09/2021  Vyvanse 10 MG Capsule  30.00  30 Ch Wet   [id number]   Kro ([id number])   0   [place]   05/10/2021  4   03/12/2021  Clonazepam 1 MG Tablet  60.00  30 Ch Wet   [id number]   Kro ([id number])   0  4.00 LME  [place]   04/12/2021  4   04/05/2021  Clonazepam 1 MG Tablet  60.00  30 Ch Wet   [id number]   Elisa ([id number])   0  4.00 LME  [place]   03/14/2021  4   03/12/2021  Clonazepam 1 MG Tablet  60.00  30 Ch Wet   [id number]   Elisa ([id number])   0  4.00 LME  [place]   03/03/2021  4   03/03/2021  Clonazepam 1 MG Tablet  28.00  14 Ch Wet   [id number]   Kro ([id number])   0  4.00 LME  [place]   02/01/2021  5   02/01/2021 Clonazepam 0.5 MG Tablet  28.00  14 Mi Christian   [id number]   Kro ([id number])   0  2.00 LME  [place]   01/21/2021  5   01/21/2021  Clonazepam 1 MG Tablet  28.00  14 Cl Libby   [id number]   Kro ([id number])   0  4.00 LME  [place]   12/07/2020  4   12/07/2020  Dextroamp-Amphetamin 20 MG Tab  60.00  30 Mi Christian   [id number]   Kro ([id number])   0   [place]   11/02/2020  4   09/02/2020  Dextroamp-Amphetamin 20 MG Tab  60.00  30 Mi Christian   [id number]   Kro ([id number])   0   [place]   10/01/2020  4   09/02/2020  Dextroamp-Amphetamin 20 MG Tab  60.00  30 Mi Christian   [id number]   Kro ([id number])   0   [place]   09/02/2020  4   09/02/2020  Dextroamp-Amphetamin 20 MG Tab  60.00  30 Mi Christian   [id number]   Kro ([id number])   0   [place]   08/03/2020  4   06/04/2020  Dextroamp-Amphetamin 20 MG Tab  60.00  30 Mi Christian   [id number]   Wal ([id number])   0   [place]   07/03/2020  4   06/04/2020  Dextroamp-Amphetamin 20 MG Tab  60.00  30 [facility]   [id number]   Wal ([id number])   0   [place]   06/04/2020  4   06/04/2020  Dextroamp-Amphetamin 20 MG Tab  60.00  30 [facility]   [id number]   Wal ([id number])   0   [place]   05/07/2020  4   05/04/2020  Dextroamp-Amphetamin 20 MG Tab  60.00  30 [facility]   [id number]   Kro ([id number])   0   [place]   04/09/2020  4   04/09/2020  Dextroamp-Amphetamin 20 MG Tab  60.00  30 Mi Christian   [id number]   Kro ([id number])   0   [place]   04/02/2020  3   04/02/2020  Dextroamp-Amphetamin 20 MG Tab  14.00  7 Da Ran   [id number]   Kro ([id number])   0   [place]   03/04/2020  2   03/04/2020  Dextroamp-Amphetamin 20 MG Tab  60.00  30 Mi Christian   [id number]   Wal (2005)   0   [place]   02/04/2020  3   12/02/2019  Dextroamp-Amphetamin 20 MG Tab  60.00  30 [facility]   [id number]   Kro ([id number])   0   [place]   01/04/2020  3   12/02/2019  Dextroamp-Amphetamin 20 MG Tab  60.00  30 [facility]   [id number]   Kro ([id number])   0   [place]   12/05/2019  3   12/02/2019  Dextroamp-Amphetamin 20 MG Tab  60.00  30 [facility]   [id number]   Kro ([id number])   0   Comm Ins   [place]   11/07/2019  2 05/10/2021 99.7     Cholesterol, Total 05/10/2021 236*    Triglycerides 05/10/2021 101     HDL 05/10/2021 62*    LDL Calculated 05/10/2021 154*    VLDL Cholesterol Calcula* 05/10/2021 20     Vit D, 25-Hydroxy 05/10/2021 27.9*    TSH Reflex FT4 05/10/2021 1.55     Sodium 05/10/2021 140     Potassium 05/10/2021 4.4     Chloride 05/10/2021 103     CO2 05/10/2021 23     Anion Gap 05/10/2021 14     Glucose 05/10/2021 95     BUN 05/10/2021 12     CREATININE 05/10/2021 0.8*    GFR Non- 05/10/2021 >60     GFR  05/10/2021 >60     Calcium 05/10/2021 9.6     Total Protein 05/10/2021 7.2     Albumin 05/10/2021 5.0     Albumin/Globulin Ratio 05/10/2021 2.3*    Total Bilirubin 05/10/2021 <0.2     Alkaline Phosphatase 05/10/2021 83     ALT 05/10/2021 27     AST 05/10/2021 23     Globulin 05/10/2021 2.2    Hospital Outpatient Visit on 03/29/2021   Component Date Value    Hookup Date 03/29/2021 Mar 29 2021      Hookup Time 03/29/2021 11:42:48     Acquisition Duration 03/29/2021 [id number]     Number Of QRS Complexes 03/29/2021 [id number]     Number Of Ventricular Ec* 03/29/2021 5     Number Of Ventricular Bi* 03/29/2021 0     Number Of Ventricular Co* 03/29/2021 0     Number Of Superventricul* 03/29/2021 1     Number Of Suptaventricul* 03/29/2021 1     Number Of Supraventricul* 03/29/2021 0     Average Heart Rate 03/29/2021 88     Holter Max Heart Rate 03/29/2021 163     Min Heart Rate 03/29/2021 56     Max Heart Rate Time/Date 03/29/2021 [id number]     Min Heart Rate Time/Date 03/29/2021 [id number]     Diagnosis 03/29/2021 This 24 hour test was scanned by Peña Hoff 3/31/2021 07:55. Tech comments:1. The rhythm was sinus. Average WI interval 0.16 average QRS duration 0.08.2. Diary returned with entry of \"panic attack while vaping nicotine, took clonazepam\" no ectopy noted. Confirmed by Josué [address] [place] [address] ([id number]) on 4/1/2021 3:16:10 PM    Hospital Outpatient Visit on 03/29/2021   Component Date Value    Left Ventricular Ejectio* 03/29/2021 58     LVEF MODALITY 03/29/2021 ECHO    Admission on 01/19/2021, Discharged on 01/19/2021   Component Date Value    Ventricular Rate 01/19/2021 113     Atrial Rate 01/19/2021 113     P-R Interval 01/19/2021 116     QRS Duration 01/19/2021 90     Q-T Interval 01/19/2021 304     QTc Calculation (Bazett) 01/19/2021 416     P Axis 01/19/2021 57     R Axis 01/19/2021 92     T Axis 01/19/2021 52     Diagnosis 01/19/2021 Sinus tachycardiaRightward axisOtherwise normal ECGWhen compared with ECG of 27-DEC-2020 18:40,No significant change was foundConfirmed by Florian rFost MD, [facility] ([id number]) on 1/20/2021 6:45:20 PM     WBC 01/19/2021 9.7     RBC 01/19/2021 5.34     Hemoglobin 01/19/2021 16.2     Hematocrit 01/19/2021 47.7     MCV 01/19/2021 89.3     MCH 01/19/2021 30.3     MCHC 01/19/2021 34.0     RDW 01/19/2021 13.7     Platelets 38/77/6685 309     MPV 01/19/2021 8.4     Neutrophils % 01/19/2021 55.3     Lymphocytes % 01/19/2021 31.5     Monocytes % 01/19/2021 11.1     Eosinophils % 01/19/2021 1.2     Basophils % 01/19/2021 0.9     Neutrophils Absolute 01/19/2021 5.4     Lymphocytes Absolute 01/19/2021 3.1     Monocytes Absolute 01/19/2021 1.1     Eosinophils Absolute 01/19/2021 0.1     Basophils Absolute 01/19/2021 0.1     Sodium 01/19/2021 134*    Potassium reflex Magnesi* 01/19/2021 4.5     Chloride 01/19/2021 95*    CO2 01/19/2021 26     Anion Gap 01/19/2021 13     Glucose 01/19/2021 89     BUN 01/19/2021 20     CREATININE 01/19/2021 0.8*    GFR Non- 01/19/2021 >60     GFR  01/19/2021 >60     Calcium 01/19/2021 10.2     Troponin 01/19/2021 <0.01     D-Dimer, Quant 01/19/2021 <200        Last Drug screen:  Lab Results   Component Value Date    LABAMPH neg 01/28/2016    LABBARB neg 01/28/2016    LABBENZ neg 01/28/2016    COCAIMETSCRU neg 01/28/2016    THC pos 01/28/2016    MDMA Likely tried to increase dose too quickly, and could have gone much slower + add propanolol to see if this helped anxiety symptoms    - has had positive response to prozac + zyprexa.      -  continue prozac 20 mg/day for now. Family members have had positive response to this     -reduce zyprexa to 2.5mg nightly for mood/anxiety/sleep. Titrate as tolerated. Family member has had positive response to this. Struggles with hypersomnia and daytime fatigue on 5mg/hs dose     - WILL NOT continue to write for controlled substances. Pt had klonopin 1mg bid prn anxiety filled at two separate pharmacies (confirmed with each pharmacy) on 5/10/21 and 5/11/21. At visit in june, admited he did  both scripts but denies knowing this was problematic. Pt does have h/o SA issues but he reports no non-prescribed meds in several months    Advised him 6/7/2021  liliya not continue to prescribe controlled subs which he acknowledges and agrees to. Will look for new prescriber. Resources sent as below. Not yet able to find new provider     - [employer] testing results reviewed previously.        B)  ADHD  - historically has only agreed with this diagnosis. Though states that adderall increased anxiety and thinks contributed to first panic attack     - mood/anxiety symptoms have stabilized since starting prozac/zyprexa. - will not continue any controlled subs (see above)    DID HAVE partial response to vyvanse 10mg/day. - increase strattera  To 80mg/day. Titrate as tolerated     -Labs: reviewed in Epic NEEDS fasting lipids, hgba1c (starting zyprexa), would also add vit d and tsh and repeat cmp (prior mild hyponatremia and started ssri).   Can be done at next in person visit              1/19/21:  Cbc wnl; bmp (mild hyponatremia)     -Recommend outpt therapy.     -OARRS reviewed, c/w history  -R/b/se/a d/w pt who consents.     3. Medical  -Following with HAMLET King - CNP  - was to have echo & 24 hour holter monitor 2/23/21, not completed.         4. Substance   -h/o cannabis use since age 13.  quit few months ago. H/o various other substances throughout lifetime     5. RTC - 6 weeks 8/23 @ 1230       Kathleen Randolph, [address], [place] Nurse Practitioner        Given previously:  Medicaid Insurance:  [place]: Intake/walk-in hours are M-F, 8:00am-12:00pm. Bring photo ID, proof of health coverage (if any), and proof of income to your first visit. Main office phone: [phone].  Main Office: [address], 2nd floor, [place], [place], [address] Sam[address] Meyer Office: [facility], [address], [address] [place] [address] [facility] Offices: [facility], [address] [facility] Office: [address], [facility] 187 707 [facility],  [address] Office: [address], [address] [place] [address] Office: [address], [address] [id number] [address] Meyer Office: [address] [facility], [address] [facility] [address]      Juaquin Gomez Metcalf/[place]: Case management, mental health prevention, substance abuse therapy, housing assistance for Britt Madden, [place], [place], & [employer]. [facility] 430, [address] [facility] [address]. [phone]. [facility]: Clinical assessment and counseling, including individual/group therapy, couples/family therapy, psychological testing, case management ([address] residents), and psychiatric medication services. For Medicaid patients only.  Call [phone] first to get started.    [address], [address] [facility] [address]  -Call: [phone]  -Multiple Locations  - [facility]   [address] [facility] [address] [place] [address], [facility] [address], [place], [address] [facility]  [address] [facility] [address], Jonatan Vilchis. [address]  [phone]

## 2021-07-22 ENCOUNTER — NURSE TRIAGE (OUTPATIENT)
Dept: OTHER | Facility: CLINIC | Age: 26
End: 2021-07-22

## 2021-07-22 NOTE — PROGRESS NOTES
Restrictions on close contact interactions as recommended by CDC and health authorities, the patient's visit was conducted via telemedicine in lieu of a face to face visit. Patient verbally consented and agreed to proceed  Owen Cassidy (:  1995) is a 27 y.o. male,Established patient, here for evaluation of the following chief complaint(s): Other (follow up)    Followed by Brittani Ponce for ADHD, bipolar disorder. He has adjusted his medications and is looking for a new Psychiatrist. Most recently he  decreased Zyprex a to 2.5 mg 2 weeks ago, then  stopped Zyprexa 5-6 days ago. His Strattera was increased to 80 mg 4 days ago by Brittani Ponce CNP (Psychiatry) after which he began feeling flushed and diaphoretic. Yesterday and today he decreased the Strattera to 40 mg and the sweating has decreased and he reports feeling better. He also reports a high heart rate since January according to his Apple watch. Does not drink a lot of caffeine. Has decreased vaping. Drinks 1 soda/day. Reports after today he will stop taking Strattera due to side effects. ASSESSMENT/PLAN:  1. Essential hypertension  -Continue taking Lisinopril 5 mg daily    2. QING (generalized anxiety disorder)  -Follow up with Psychiatry  -Continue Prozac 20 mg daily    3. Attention deficit hyperactivity disorder (ADHD), predominantly inattentive type- Strattera can cause increase in heart rate and sweating  -discontinue Strattera  -Make appointment with Psychiatry    I am having Owen Cassidy maintain his lisinopril, atomoxetine, and FLUoxetine. No follow-ups on file. Subjective   SUBJECTIVE/OBJECTIVE:  HPI  Depression/ Anxiety  history of depression prescribed Prozac. He describes his anxiety as panic attacks, associated with shortness of breath and palpitations. He reports his mother has a history of Bipolar depression and ADHD.  Evaluated in the emergency department in January with panic attack, heart rate ranging from 110's-130's. He reports isolation, which has increased since pandemic. He denies homicidal/suicidal ideations. He admits to taking the Prozac which he states is effective. Followed by Jasper Mota, CNP for mood disorder, was being prescribed Klonopin 1 mg as needed for anxiety. Per his report he picked up prescription early and she has discontinued prescribing Klonopin as well as Vyvanse.        ADHD  Reports Jasper Mota has increased his Strattera. Mother has a history of ADHD, prescribed medication. He is in the process of searching for a new Psychiatrist, having difficulty with those accepting his insurance.      He is presently working from home 9-5:30 as a . He is able to care his daughter, less gas cost.     Hypertension  Blood pressure well controlled on  Lisinopril. Denies headache, dizziness, chest pain or shortness of breath. reports his mother has a history of hypertension.     Review of Systems   Constitutional: Negative for activity change. HENT: Negative for congestion. Respiratory: Negative for chest tightness, shortness of breath and wheezing. Cardiovascular:        Hypertension   Gastrointestinal: Negative for abdominal distention. Endocrine: Negative for cold intolerance. Genitourinary: Negative for difficulty urinating. Musculoskeletal: Negative for arthralgias. Neurological: Negative for dizziness. Psychiatric/Behavioral: Negative for agitation. ADHD  Bipolar          Objective   Physical Exam  Constitutional:       Appearance: Normal appearance. He is well-developed and well-groomed. HENT:      Head: Normocephalic. Eyes:      General: Lids are normal. Vision grossly intact. Pulmonary:      Effort: Pulmonary effort is normal.   Neurological:      General: No focal deficit present. Mental Status: He is alert and oriented to person, place, and time.    Psychiatric:         Attention and Perception: Attention normal. Mood and Affect: Mood normal.         Speech: Speech normal.         Behavior: Behavior is cooperative.      -Physical exam limited due to virtual visit       On this date 7/23/2021 I have spent 20 minutes reviewing previous notes, test results and face to face with the patient discussing the diagnosis and importance of compliance with the treatment plan as well as documenting on the day of the visit. Tigre Hernandez, was evaluated through a synchronous (real-time) audio-video encounter. The patient (or guardian if applicable) is aware that this is a billable service. Verbal consent to proceed has been obtained within the past 12 months. The visit was conducted pursuant to the emergency declaration under the [facility], [address] [place] [address] authority and the thePlatform and [employer] General Act. Patient identification was verified, and a caregiver was present when appropriate. The patient was located in a state where the provider was credentialed to provide care. Total time spent for this encounter: Not billed by time    --HAMLET Ray - CNP on 7/28/2021 at 5:27 PM    An electronic signature was used to authenticate this note. An electronic signature was used to authenticate this note. Reviewed patient's pertinent medical history, relevant laboratory results, imaging studies, and health maintenance. Medications have been reviewed and discussed with the patient, refills otherwise up-to-date. Discussed the importance of adhering to current medication regimen. Advised:  (1) continue to work on eating a healthy balanced diet; (2) stay active by exercising within your personal limits. Patient was advised to keep future appointments with their respective specialty care team(s). Questions and concerns addressed, care plan reviewed and patient is agreeable with the care plan following today's visit.   Tigre Hernandez, was evaluated through a synchronous (real-time) audio-video encounter. The patient (or guardian if applicable) is aware that this is a billable service. Verbal consent to proceed has been obtained within the past 12 months. The visit was conducted pursuant to the emergency declaration under the [facility], [address] [place] [address] authority and the [employer] and [employer] General Act. Patient identification was verified, and a caregiver was present when appropriate. The patient was located in a state where the provider was credentialed to provide care. Total time spent for this encounter: Not billed by time    --HAMLET Brar CNP on 7/28/2021 at 5:19 PM    An electronic signature was used to authenticate this note.         --HAMLET Brar CNP

## 2021-07-22 NOTE — TELEPHONE ENCOUNTER
Reason for Disposition   [1] MODERATE sweating (e.g., interferes with normal activities like work or school) AND [2] possibly related to new medication or change in medication dosage   Patient sounds very sick or weak to the triager    Additional Information   Negative: Visible sweat on face or sweat dripping down face     Pt is having sweating but is on medications that can cause sweating. Pt also informed writer after discussing his HR that he was seen for this in January and it is not a new symptom, but just concerns the pt. Pt has anxiety and it is believed to be contributing to the increased HR. Answer Assessment - Initial Assessment Questions  1. ONSET: \"When did the sweating start? \"         4-5 days     2. LOCATION: \"What part of your body has excessive sweating? \" (e.g., entire body; just face, underarms, palms, or soles of feet). Feels like it is more all over, but his forehead seems the worst and is always damp and needs wiped     3. SEVERITY: \"How bad is the sweating? \"    (Scale 1-10; or mild, moderate, severe)    -  MILD (1-3): doesn't interfere with normal activities     -  MODERATE (4-7): interferes with normal activities (e.g., work or school) or awakens from sleep; causes embarrassment in social situations     -  SEVERE (8-10): drenching sweats and has to change bed clothes or bed linens. 7/10- not debilitating but it is annoying     4. CAUSE: \"What do you think is causing the sweating? \"         Pt was taking Strattera 40mg and then was told to take 80mg and pt started feeling this way so he dropped it backed to 40mg. Pt also takes zyprexa 5mg at night and stopped that a week ago due to not liking the way it made him feel. He states he did wean himself down prior to stopping. 5. FEVER: \"Have you been having fevers? \"           hasn't checked but doesn't really feel sick - Pt checked it and it was 97.5 while speaking to writer     6.  OTHER SYMPTOMS: \"Do you have any other think is causing the palpitations? \"           Unsure- pt was seen in January and things are the same but still high, pt suffers from anxiety and has been told that is what could cause his HR to be high. 9. CARDIAC HISTORY: \"Do you have any history of heart disease? \" (e.g., heart attack, angina, bypass surgery, angioplasty, arrhythmia)             Just the above issue with HR but no heart disease     10. OTHER SYMPTOMS: \"Do you have any other symptoms? \" (e.g., dizziness, chest pain, sweating, difficulty breathing)            Sweating, feet tingling     11. PREGNANCY: \"Is there any chance you are pregnant? \" \"When was your last menstrual period? \"              NA    Protocols used: SWEATING-ADULT-AH, HEART RATE AND HEARTBEAT QUESTIONS-ADULT-[place]    Received call from [address] [facility]  at [facility] with Red Flag Complaint. Brief description of triage: see above     Triage indicates for patient to go to [address] [place] [address] for HR and sweating. Writer called PCP and spoke to Gilmer Narayanan NP with a recommendation of pt seeing her tomorrow via a VV to discuss symptoms. Pt was advised, verbalized understanding and was agreeable to plan. Care advice provided, patient verbalizes understanding; denies any other questions or concerns; instructed to call back for any new or worsening symptoms. Writer provided warm transfer to Rolley Duverney   at [facility] for appointment scheduling per the recommendation of Gilmer Narayanan NP. Attention Provider: Thank you for allowing me to participate in the care of your patient. The patient was connected to triage in response to information provided to the [facility]. Please do not respond through this encounter as the response is not directed to a shared pool.

## 2021-07-23 ENCOUNTER — VIRTUAL VISIT (OUTPATIENT)
Dept: PRIMARY CARE CLINIC | Age: 26
End: 2021-07-23
Payer: COMMERCIAL

## 2021-07-23 DIAGNOSIS — F90.0 ATTENTION DEFICIT HYPERACTIVITY DISORDER (ADHD), PREDOMINANTLY INATTENTIVE TYPE: ICD-10-CM

## 2021-07-23 DIAGNOSIS — F41.1 GAD (GENERALIZED ANXIETY DISORDER): ICD-10-CM

## 2021-07-23 DIAGNOSIS — I10 ESSENTIAL HYPERTENSION: Primary | ICD-10-CM

## 2021-07-23 PROCEDURE — 4004F PT TOBACCO SCREEN RCVD TLK: CPT | Performed by: NURSE PRACTITIONER

## 2021-07-23 PROCEDURE — G8419 CALC BMI OUT NRM PARAM NOF/U: HCPCS | Performed by: NURSE PRACTITIONER

## 2021-07-23 PROCEDURE — 99214 OFFICE O/P EST MOD 30 MIN: CPT | Performed by: NURSE PRACTITIONER

## 2021-07-23 PROCEDURE — G8427 DOCREV CUR MEDS BY ELIG CLIN: HCPCS | Performed by: NURSE PRACTITIONER

## 2021-07-28 ASSESSMENT — ENCOUNTER SYMPTOMS
ABDOMINAL DISTENTION: 0
WHEEZING: 0
CHEST TIGHTNESS: 0
SHORTNESS OF BREATH: 0

## 2021-07-28 ASSESSMENT — VISUAL ACUITY: OU: 1

## 2021-08-04 ASSESSMENT — ENCOUNTER SYMPTOMS
SHORTNESS OF BREATH: 0
CHEST TIGHTNESS: 0
ABDOMINAL PAIN: 0
CONSTIPATION: 0
DIARRHEA: 0

## 2021-08-04 NOTE — PROGRESS NOTES
Irlanda Persaud (:  1995) is a 27 y.o. male,Established patient, here for evaluation of the following chief complaint(s): Other (follow up heart rate,blood pressure)         ASSESSMENT/PLAN:  1. Essential hypertension-controlled  -Continue Lisinopril 5 mg daily    2. Attention deficit hyperactivity disorder (ADHD), predominantly inattentive type  -Not taking medication at this time  -Follow up with Rayford Bernheim    3. Mixed hyperlipidemia- Admits to unhealthy eating habits previously, while taking Zyprexa  -Decrease fatty, fried, fast foods  -Have Lipid level drawn in 3 months  -     Lipid Panel; Future    4. Need for hepatitis C screening test  -     Hepatitis C Antibody; Future    5. Tachycardia- HR 84 today, has resolved since discontinuing medication and life style changes  Has decreased caffeine intake  -Has discontinued ADHD medication    I have discontinued Luke Harrison's atomoxetine. I am also having him maintain his lisinopril and FLUoxetine. Return in about 4 months (around 2021) for HTN, ADHD. Healthcare Maintenance:  Covid vaccine: past due  Pneumonia Vaccine: past due    Subjective   SUBJECTIVE/OBJECTIVE:  HPI  Followed by Rayford Bernheim for ADHD, bipolar disorder. He has adjusted his medications and is looking for a new Psychiatrist. Most recently he  decreased Zyprex a to 2.5 mg 2 weeks ago, then  stopped Zyprexa 5-6 days ago. His Strattera was increased to 80 mg 4 days ago by Rayford Bernheim, CNP (Psychiatry) after which he began feeling flushed and diaphoretic. Yesterday and today he decreased the Strattera to 40 mg and the sweating has decreased and he reports feeling better. He also reports a high heart rate since January according to his Apple watch. He has stopped taking Caffeine, all ADHD medication, and Zyprexa. States work is going well, although not taking medication. Continues to Vape. Depression/ Anxiety  history of depression prescribed Prozac. He describes his anxiety as panic attacks, associated with shortness of breath and palpitations. He reports his mother has a history of Bipolar depression and ADHD. Evaluated in the emergency department in January with panic attack, heart rate ranging from 110's-130's. He reports isolation, which has increased since pandemic. He denies homicidal/suicidal ideations. He admits to taking the Prozac which he states is effective. Followed by Valdemar Lanier CNP for mood disorder, was being prescribed Klonopin 1 mg as needed for anxiety. Per his report he picked up prescription early and she has discontinued prescribing Klonopin as well as Vyvanse.      He is presently working from home 9-5:30 as a . He is able to care his daughter, less gas cost.     Hypertension  Blood pressure well controlled on  Lisinopril. Denies headache, dizziness, chest pain or shortness of breath. reports his mother has a history of hypertension. Tachycardia has resolved     Review of Systems   Constitutional: Negative for activity change and fever. HENT: Negative for congestion. Eyes: Negative for visual disturbance. Respiratory: Negative for chest tightness and shortness of breath. Cardiovascular: Negative for chest pain, palpitations and leg swelling. Hypertension   Gastrointestinal: Negative for abdominal pain, constipation and diarrhea. Endocrine: Negative for polyuria. Genitourinary: Negative for dysuria. Musculoskeletal: Negative for arthralgias and myalgias. Skin: Negative for rash. Neurological: Negative for dizziness, light-headedness and headaches. Psychiatric/Behavioral: Negative for agitation, decreased concentration and sleep disturbance. The patient is not nervous/anxious. Anxiety/depression  Panic attacks         height is 5' 7\" (1.702 m) and weight is 161 lb (73 kg). His temporal temperature is 97.2 °F (36.2 °C). His blood pressure is 128/86 and his pulse is 84. His oxygen saturation is 96%. Past Medical History:   Diagnosis Date    Anxiety     Bronchitis     Depression     Insomnia     Panic attacks     Pneumonia 2009    Spondylosis      Past Surgical History:   Procedure Laterality Date    APPENDECTOMY     Sher Cortez Left     Dr. Harris Yan. Medial meniscus tear    WISDOM TOOTH EXTRACTION       Family History   Problem Relation Age of Onset    Depression Mother     Liver Disease Mother         WRIGHT    Depression Sister     High Blood Pressure Other      Objective   Physical Exam  Vitals reviewed. Constitutional:       Appearance: Normal appearance. He is well-developed and well-groomed. HENT:      Head: Normocephalic. Right Ear: Hearing normal.      Left Ear: Hearing normal.   Eyes:      General: Lids are normal. Vision grossly intact. Cardiovascular:      Rate and Rhythm: Normal rate and regular rhythm. Heart sounds: Normal heart sounds, S1 normal and S2 normal.   Pulmonary:      Effort: Pulmonary effort is normal.      Breath sounds: Normal breath sounds. Skin:     General: Skin is warm and dry. Neurological:      Mental Status: He is alert. Psychiatric:         Attention and Perception: Attention normal.         Mood and Affect: Mood normal.         Speech: Speech normal.         Behavior: Behavior is cooperative. Cognition and Memory: Cognition and memory normal.            On this date 8/5/2021 I have spent 15 minutes reviewing previous notes, test results and face to face with the patient discussing the diagnosis and importance of compliance with the treatment plan as well as documenting on the day of the visit. An electronic signature was used to authenticate this note.     --HAMLET Robins - CNP

## 2021-08-05 ENCOUNTER — OFFICE VISIT (OUTPATIENT)
Dept: PRIMARY CARE CLINIC | Age: 26
End: 2021-08-05
Payer: COMMERCIAL

## 2021-08-05 VITALS
TEMPERATURE: 97.2 F | SYSTOLIC BLOOD PRESSURE: 128 MMHG | WEIGHT: 161 LBS | HEIGHT: 67 IN | BODY MASS INDEX: 25.27 KG/M2 | HEART RATE: 84 BPM | DIASTOLIC BLOOD PRESSURE: 86 MMHG | OXYGEN SATURATION: 96 %

## 2021-08-05 DIAGNOSIS — Z11.59 NEED FOR HEPATITIS C SCREENING TEST: ICD-10-CM

## 2021-08-05 DIAGNOSIS — I10 ESSENTIAL HYPERTENSION: Primary | ICD-10-CM

## 2021-08-05 DIAGNOSIS — R00.0 TACHYCARDIA: ICD-10-CM

## 2021-08-05 DIAGNOSIS — E78.2 MIXED HYPERLIPIDEMIA: ICD-10-CM

## 2021-08-05 DIAGNOSIS — F90.0 ATTENTION DEFICIT HYPERACTIVITY DISORDER (ADHD), PREDOMINANTLY INATTENTIVE TYPE: ICD-10-CM

## 2021-08-05 PROCEDURE — G8419 CALC BMI OUT NRM PARAM NOF/U: HCPCS | Performed by: NURSE PRACTITIONER

## 2021-08-05 PROCEDURE — 99214 OFFICE O/P EST MOD 30 MIN: CPT | Performed by: NURSE PRACTITIONER

## 2021-08-05 PROCEDURE — 4004F PT TOBACCO SCREEN RCVD TLK: CPT | Performed by: NURSE PRACTITIONER

## 2021-08-05 PROCEDURE — G8427 DOCREV CUR MEDS BY ELIG CLIN: HCPCS | Performed by: NURSE PRACTITIONER

## 2021-08-05 ASSESSMENT — VISUAL ACUITY: OU: 1

## 2021-08-05 NOTE — PATIENT INSTRUCTIONS
Have cholesterol level drawn in November before Thanksgiving  Patient Education        High Blood Pressure: Care Instructions  Overview     It's normal for blood pressure to go up and down throughout the day. But if it stays up, you have high blood pressure. Another name for high blood pressure is hypertension. Despite what a lot of people think, high blood pressure usually doesn't cause headaches or make you feel dizzy or lightheaded. It usually has no symptoms. But it does increase your risk of stroke, heart attack, and other problems. You and your doctor will talk about your risks of these problems based on your blood pressure. Your doctor will give you a goal for your blood pressure. Your goal will be based on your health and your age. Lifestyle changes, such as eating healthy and being active, are always important to help lower blood pressure. You might also take medicine to reach your blood pressure goal.  Follow-up care is a key part of your treatment and safety. Be sure to make and go to all appointments, and call your doctor if you are having problems. It's also a good idea to know your test results and keep a list of the medicines you take. How can you care for yourself at home? Medical treatment  · If you stop taking your medicine, your blood pressure will go back up. You may take one or more types of medicine to lower your blood pressure. Be safe with medicines. Take your medicine exactly as prescribed. Call your doctor if you think you are having a problem with your medicine. · Talk to your doctor before you start taking aspirin every day. Aspirin can help certain people lower their risk of a heart attack or stroke. But taking aspirin isn't right for everyone, because it can cause serious bleeding. · See your doctor regularly. You may need to see the doctor more often at first or until your blood pressure comes down.   · If you are taking blood pressure medicine, talk to your doctor before you arms.  ? Lightheadedness or sudden weakness. ? A fast or irregular heartbeat. 1. You have symptoms of a stroke. These may include:  ? Sudden numbness, tingling, weakness, or loss of movement in your face, arm, or leg, especially on only one side of your body. ? Sudden vision changes. ? Sudden trouble speaking. ? Sudden confusion or trouble understanding simple statements. ? Sudden problems with walking or balance. ? A sudden, severe headache that is different from past headaches. · You have severe back or belly pain. Do not wait until your blood pressure comes down on its own. Get help right away. Call your doctor now or seek immediate care if:    · Your blood pressure is much higher than normal (such as 180/120 or higher), but you don't have symptoms. 1. You think high blood pressure is causing symptoms, such as:  ? Severe headache.  ? Blurry vision. Watch closely for changes in your health, and be sure to contact your doctor if:    · Your blood pressure measures higher than your doctor recommends at least 2 times. That means the top number is higher or the bottom number is higher, or both. · You think you may be having side effects from your blood pressure medicine. Where can you learn more? Go to https://"[employer].Cardia. org and sign in to your "[employer]" account. Enter Y919 in the 6sicuro.it box to learn more about \"High Blood Pressure: Care Instructions. \"     If you do not have an account, please click on the \"Sign Up Now\" link. Current as of: August 31, 2020               Content Version: 12.8  © 2006-2021 Healthwise, Incorporated. Care instructions adapted under license by [facility] ([facility]). If you have questions about a medical condition or this instruction, always ask your healthcare professional. [place] [address] any warranty or liability for your use of this information.        Make life style changes:  Exercise for at least 30 minutes 3 times weekly. Decrease fatty, fried, fast and processed foods. The medication list included in this document is our record of what you are currently taking, including any changes that were made at today's visit.  If you find any differences when compared to your medications at home, or have any questions that were not answered at your visit, please contact the office. Patient Education        Learning About Benefits From Quitting Smoking  How does quitting smoking make you healthier? If you're thinking about quitting smoking, you may have a few reasons to be smoke-free. Your health may be one of them. · When you quit smoking, you lower your risks for cancer, lung disease, heart attack, stroke, blood vessel disease, and blindness from macular degeneration. · When you're smoke-free, you get sick less often, and you heal faster. You are less likely to get colds, flu, bronchitis, and pneumonia. · As a nonsmoker, you may find that your mood is better and you are less stressed. When and how will you feel healthier? Quitting has real health benefits that start from day 1 of being smoke-free. And the longer you stay smoke-free, the healthier you get and the better you feel. The first hours  · After just 20 minutes, your blood pressure and heart rate go down. That means there's less stress on your heart and blood vessels. · Within 12 hours, the level of carbon monoxide in your blood drops back to normal. That makes room for more oxygen. With more oxygen in your body, you may notice that you have more energy than when you smoked. After 2 weeks  · Your lungs start to work better. · Your risk of heart attack starts to drop. After 1 month  · When your lungs are clear, you cough less and breathe deeper, so it's easier to be active. · Your sense of taste and smell return. That means you can enjoy food more than you have since you started smoking.   Over the years  · Over the years, your risks of heart disease, heart attack, and stroke are lower. · After 10 years, your risk of dying from lung cancer is cut by about half. And your risk for many other types of cancer is lower too. How would quitting help others in your life? When you quit smoking, you improve the health of everyone who now breathes in your smoke. · Their heart, lung, and cancer risks drop, much like yours. · They are sick less. For babies and small children, living smoke-free means they're less likely to have ear infections, pneumonia, and bronchitis. · If you're a woman who is or will be pregnant someday, quitting smoking means a healthier . · Children who are close to you are less likely to become adult smokers. Where can you learn more? Go to https://Anevia.Reciclata. org and sign in to your [employer] account. Enter [id number] in the [employer]Rx box to learn more about \"Learning About Benefits From Quitting Smoking. \"     If you do not have an account, please click on the \"Sign Up Now\" link. Current as of: 2021               Content Version: 12.9  © 6438-4704 [employer]. Care instructions adapted under license by [facility] ([facility]). If you have questions about a medical condition or this instruction, always ask your healthcare professional. [address] any warranty or liability for your use of this information. Patient Education        COVID-19 Vaccine: Care Instructions  Overview     The COVID-19 vaccine can help you avoid getting COVID-19, a disease caused by a type of coronavirus. COVID-19 can cause pneumonia and even death. You may need 1 or 2 doses of the vaccine. And you might need \"booster\" doses later to help you stay protected. It takes two weeks after your last dose for the vaccine to protect you from COVID-19. The vaccine prevents most cases of COVID-19.  But if you do still catch COVID-19, your symptoms will probably be less severe than if you hadn't gotten the vaccine. You can't get COVID-19 from the vaccine. What are the side effects of the COVID-19 vaccine? You might not have any side effects. But if you do, they'll probably be like side effects of other vaccines, including:  · Fever. · Soreness. · Feeling very tired. This is normal. Your body is building protection against COVID-19. You may also have other side effects, including:  · Chills. · Headache. · Pain, redness, or swelling in the arm where you had the vaccine. · Swollen lymph nodes in the armpit of the arm where you had the vaccine. Your side effects will likely go away in a few days. Until then, it may be harder to do things like work, school, or exercise. You may need 1 or 2 doses. If you get two doses, you may notice side effects more after the second dose. If you think you've been exposed to COVID-19 or have symptoms like a cough, trouble breathing, or a new loss of smell or taste, call your doctor. You might need a COVID test.  Follow-up care is a key part of your treatment and safety. Be sure to make and go to all appointments, and call your doctor if you are having problems. It's also a good idea to know your test results and keep a list of the medicines you take. How can you care for yourself at home? · If you have a sore arm or a fever after getting the COVID-19 vaccine, you can take an over-the-counter pain medicine, such as acetaminophen or ibuprofen. Read and follow all instructions on the label. Do not give aspirin to anyone younger than 20. It has been linked to Reye syndrome, a serious illness. · Put ice or a cold pack on the sore area for 10 to 20 minutes at a time. Put a thin cloth between the ice and your skin. · If you have side effects, such as a fever, be sure to get enough rest and drink plenty of fluids.   · Until everyone is fully vaccinated, continue to practice COVID-19 safety guidelines like social distancing, wearing a mask, and following all the steps for good hand-washing. · You don't need to wear a mask when you are around other fully vaccinated people. And you don't need one around unvaccinated people who are all from one other household, as long as no one in that household is at high risk of severe illness from COVID-19. When should you call for help? Call 911  anytime you think you may need emergency care. For example, call if after getting the COVID-19 vaccine:    · You have symptoms of a severe reaction to the vaccine. Symptoms of a severe reaction may include:  ? Severe difficulty breathing. ? Sudden raised, red areas (hives) all over your body. ? Severe lightheadedness. Watch closely for changes in your health, and be sure to contact your doctor if you have any problems. Where can you learn more? Go to https://EZ-TicketpeAppercodeeb.Carmine. org and sign in to your [employer] account. Enter C126 in the [employer] box to learn more about \"COVID-19 Vaccine: Care Instructions. \"     If you do not have an account, please click on the \"Sign Up Now\" link. Current as of: March 26, 2021               Content Version: 12.9  © 2006-2021 [employer]. Care instructions adapted under license by [facility] ([facility]). If you have questions about a medical condition or this instruction, always ask your healthcare professional. [place] [address] any warranty or liability for your use of this information. Patient Education        pneumococcal polysaccharides vaccine (PPSV), 23-valent  Pronunciation:  LAMONT Islas  Brand:  Pneumovax 23  What is the most important information I should know about this vaccine? PPSV should be given at least 2 weeks before the start of any treatment that can weaken your immune system. PPSV is also given at least 2 weeks before you undergo a splenectomy (surgical removal of the spleen). The timing of this vaccination is very important for it to be effective.  Follow your doctor's instructions. You can still receive a vaccine if you have a cold or fever. In the case of a more severe illness with a fever or any type of infection, wait until you get better before receiving this vaccine. You should not receive a booster vaccine if you had a life-threatening allergic reaction after the first shot. Keep track of any and all side effects you have after receiving this vaccine. If you ever need to receive a booster dose, you will need to tell your doctor if the previous shot caused any side effects. Becoming infected with pneumococcal disease (such as pneumonia or meningitis) is much more dangerous to your health than receiving this vaccine. However, like any medicine, this vaccine can cause side effects but the risk of serious side effects is extremely low. What is pneumococcal polysaccharides vaccine (PPSV)? Pneumococcal disease is a serious infection caused by a bacteria. Pneumococcal bacteria can infect the sinuses and inner ear. It can also infect the lungs, blood, and brain and these conditions can be fatal.  Pneumococcal polysaccharides vaccine (PPSV) is used to prevent infection caused by pneumococcal bacteria. PPSV contains 23 of the most common types of pneumococcal bacteria. PPSV works by exposing you to a small dose of the bacteria or a protein from the bacteria, which causes your body to develop immunity to the disease. PPSV will not treat an active infection that has already developed in the body. PPSV is for use only in adults and children who are at least 3years old. For children younger than 3years old, another vaccine called Prevnar (pneumococcal conjugate vaccine [PCV] 7-valent) is used, usually given between the ages of 2 months and 15 months. Like any vaccine, PPSV may not provide protection from disease in every person. What should I discuss with my healthcare provider before receiving this vaccine?   You should not receive this vaccine if you have ever had a life-threatening allergic reaction to any pneumococcal polysaccharides vaccine. Before receiving this vaccine, tell your doctor if you are allergic to any drugs, or if you have a bleeding or blood clotting disorder such as hemophilia, or easy bruising. The timing and number of PPSV doses you receive will depend on whether you have any of these other conditions:  · cancer, leukemia, lymphoma, or multiple myeloma;  · HIV or AIDS;  · sickle cell disease;  · a kidney condition called nephrotic syndrome;  · a history of organ or bone marrow transplant;  · if you are receiving chemotherapy;  · if you have been using steroid medication for a long period of time;  · if you are scheduled to have your spleen removed (splenectomy); or  · if you have received a pneumococcal vaccine within the past 3 to 5 years. You can still receive a vaccine if you have a cold or fever. In the case of a more severe illness with a fever or any type of infection, wait until you get better before receiving this vaccine. Vaccines may be harmful to an unborn baby and generally should not be given to a pregnant woman. However, not vaccinating the mother could be more harmful to the baby if the mother becomes infected with a disease that this vaccine could prevent. Your doctor will decide whether you should receive this vaccine, especially if you have a high risk of infection with pneumococcal disease. It is not known whether PPSV passes into breast milk or if it could harm a nursing baby. Do not use this medication without telling your doctor if you are breast-feeding a baby. How is this vaccine given? PPSV is given as an injection (shot) under the skin or into a muscle of your arm or thigh. You will receive this injection in a doctor's office or other clinic setting. PPSV is usually given as a routine vaccination in adults who are 72 years and older.   PPSV may also be given to people between the ages 3and 59years old who have:  · heart disease, lung disease, or diabetes;  · a cerebrospinal fluid leak, or a cochlear implant (an electronic hearing device);  · alcoholism or liver disease (including cirrhosis);  · sickle cell disease or a disorder of the spleen;  · a weak immune system caused by HIV, AIDS, cancer, kidney failure, organ transplantation, or a damaged spleen; or  · a weak immune system caused by taking steroids or receiving chemotherapy or radiation treatment. PPSV may also be given to people between the ages 23and 59years old who smoke or have asthma. PPSV should be given at least 2 weeks before the start of any treatment that can weaken your immune system. PPSV is also given at least 2 weeks before you undergo a splenectomy (surgical removal of the spleen). The timing of this vaccination is very important for it to be effective. Follow your doctor's instructions. Your doctor may recommend treating fever and pain with an aspirin-free pain reliever such as acetaminophen (Tylenol) or ibuprofen (Motrin, Advil, and others) when the shot is given and for the next 24 hours. Follow the label directions or your doctor's instructions about how much of this medicine to take. If your doctor has prescribed an antibiotic (such as penicillin) to help prevent infection with pneumococcal bacteria, do not stop using the antibiotic after you receive the PPSV. Take the antibiotic for the entire length of time prescribed by your doctor. Most people receive only one PPSV shot during their lifetime. However, people in certain age groups or with certain disease conditions that put them at risk of infection may need to receive more than one vaccine. Before receiving this vaccine, tell your doctor if you have received a pneumococcal vaccine within the past 3 to 5 years. What happens if I miss a dose? Since PPSV is usually given only one time, you will most likely not be on a dosing schedule.  If you are receiving a repeat PPSV shot, be sure to tell your doctor if it has been less than 5 years since you last received a pneumococcal vaccine. What happens if I overdose? An overdose of this vaccine is not likely to occur. What should I avoid before or after receiving this vaccine ? Follow your doctor's instructions about any restrictions on food, beverages, or activity. What are the possible side effects of this vaccine? You should not receive a booster vaccine if you had a life-threatening allergic reaction after the first shot. Keep track of any and all side effects you have after receiving this vaccine. If you ever need to receive a booster dose, you will need to tell your doctor if the previous shot caused any side effects. Becoming infected with pneumococcal disease (such as pneumonia or meningitis) is much more dangerous to your health than receiving this vaccine. However, like any medicine, this vaccine can cause side effects but the risk of serious side effects is extremely low. Get emergency medical help if you have any of these signs of an allergic reaction: hives; difficulty breathing; swelling of your face, lips, tongue, or throat. Call your doctor at once if you have a serious side effect such as:  · high fever (103 degrees or higher);  · easy bruising or bleeding;  · swollen glands with skin rash or itching, joint pain, and general ill feeling;  · pale or yellowed skin, dark colored urine, confusion or weakness;  · numbness or tingly feeling in your feet and spreading upward, severe lower back pain;  · changes in behavior, problems with vision, speech, swallowing, or bladder and bowel functions; or  · slow heart rate, trouble breathing, feeling like you might pass out.   Less serious side effects are more likely to occur, such as:  · low fever (102 degrees or less), chills, tired feeling;  · swelling, pain, tenderness, or redness anywhere on your body;  · headache, nausea, vomiting;  · joint or muscle pain;  · swelling or stiffness in the arm or leg the vaccine was injected into;  · mild skin rash; or  · mild soreness, warmth, redness, swelling, or a hard lump where the shot was given. This is not a complete list of side effects and others may occur. Call your doctor for medical advice about side effects. You may report vaccine side effects to the [address] [place] [address] and [facility] at [phone]. What other drugs will affect pneumococcal polysaccharides vaccine (PPSV)? Before receiving this vaccine, tell the doctor about all other vaccines you have recently received. Also tell the doctor if you have recently received drugs or treatments that can weaken the immune system, including:  · an oral, nasal, inhaled, or injectable steroid medicine;  · medications to treat psoriasis, rheumatoid arthritis, or other autoimmune disorders, such as azathioprine (Imuran), etanercept (Enbrel), leflunomide ([address]), and others; or  · medicines to treat or prevent organ transplant rejection, such as basiliximab (Simulect), cyclosporine (Sandimmune, Neoral, Gengraf), muromonab-CD3 (Orthoclone), mycophenolate mofetil (CellCept), sirolimus (Rapamune), or tacrolimus (Prograf). If you are using any of these medications, you may not be able to receive the vaccine, or may need to wait until the other treatments are finished. This list is not complete and other drugs may interact with PPSV. Tell your doctor about all medications you use. This includes prescription, over-the-counter, vitamin, and herbal products. Do not start a new medication without telling your doctor. Where can I get more information? Your doctor or pharmacist may have additional information about pneumococcal polysaccharides vaccine. You may also find additional information from your local health department or the Centers for Disease Control and Prevention.   Remember, keep this and all other medicines out of the reach of children, never share your medicines with others, and use this medication only for the indication prescribed. Every effort has been made to ensure that the information provided by [address] is accurate, up-to-date, and complete, but no guarantee is made to that effect. Drug information contained herein may be time sensitive. [facility] information has been compiled for use by healthcare practitioners and consumers in the [place] and therefore [facility] does not warrant that uses outside of the [place] are appropriate, unless specifically indicated otherwise. [facility]'s drug information does not endorse drugs, diagnose patients or recommend therapy. [facility]'s drug information is an informational resource designed to assist licensed healthcare practitioners in caring for their patients and/or to serve consumers viewing this service as a supplement to, and not a substitute for, the expertise, skill, knowledge and judgment of healthcare practitioners. The absence of a warning for a given drug or drug combination in no way should be construed to indicate that the drug or drug combination is safe, effective or appropriate for any given patient. [facility] does not assume any responsibility for any aspect of healthcare administered with the aid of information [facility] provides. The information contained herein is not intended to cover all possible uses, directions, precautions, warnings, drug interactions, allergic reactions, or adverse effects. If you have questions about the drugs you are taking, check with your doctor, nurse or pharmacist.  Copyright 5702-5181 [address]: 5.02. Revision date: 10/17/2012. Care instructions adapted under license by [facility] ([facility]). If you have questions about a medical condition or this instruction, always ask your healthcare professional. [place] [address] any warranty or liability for your use of this information.

## 2021-10-24 NOTE — TELEPHONE ENCOUNTER
----- Message from [facility] sent at 2/1/2021 12:11 PM EST -----  Subject: Medication Problem    QUESTIONS  Name of Medication? cariprazine hcl (VRAYLAR) 3 MG CAPS capsule  Patient-reported dosage and instructions? 3MG one a day  What question or problem do you have with the medication? Patient feels   this medication is having all negative side effects. He feels that he is   getting anxious from the medication. He feels that his anxiety is   worsening. Thinks he has panic disorder and had a panic attack on 2/1/21. Is going to book a VV. Preferred Pharmacy? [facility] [address] [facility]  Pharmacy phone number (if available)? [phone]  Additional Information for Provider?   ---------------------------------------------------------------------------  --------------  CALL BACK INFO  What is the best way for the office to contact you? OK to leave message on   voicemail  Preferred Call Back Phone Number? [phone]  ---------------------------------------------------------------------------  --------------  SCRIPT ANSWERS  Relationship to Patient?  Self
Enrique Ramirez MD   [address]  [facility] [address]       Courtney Oh, CNP  [address] [facility] [address]  [place] [address] [phone]      Dear Dr. Ramirez,     I had the pleasure of seeing Kimberly Sousa for a neurosurgical evaluation. Please see my notes and plan below, which includes obtaining a lumbar MRI. She will be obtaining imaging today I look forward to following your patient along with you.     Please do not hesitate to contact me with any questions or concerns.     Thank you,  Courtney Oh, CNP       
Please follow up with your pediatrician in 1-2 days after your child leaves the hospital.     Croup, Pediatric  Croup is an infection that causes swelling and narrowing of the upper airway. It is seen mainly in children. Croup usually lasts several days, and it is generally worse at night. It is characterized by a barking cough.    What are the causes?  This condition is most often caused by a virus. Your child can catch a virus by:    Breathing in droplets from an infected person's cough or sneeze.  Touching something that was recently contaminated with the virus and then touching his or her mouth, nose, or eyes.    What increases the risk?  This condition is more like to develop in:    Children between the ages of 3 months old and 5 years old.  Boys.  Children who have at least one parent with allergies or asthma.    What are the signs or symptoms?  Symptoms of this condition include:    A barking cough.  Low-grade fever.  A harsh vibrating sound that is heard during breathing (stridor).    How is this diagnosed?  This condition is diagnosed based on:    Your child's symptoms.  A physical exam.  An X-ray of the neck.    How is this treated?  Treatment for this condition depends on the severity of the symptoms. If the symptoms are mild, croup may be treated at home. If the symptoms are severe, it will be treated in the hospital. Treatment may include:    Using a cool mist vaporizer or humidifier.  Keeping your child hydrated.  Medicines, such as:    Medicines to control your child's fever.  Steroid medicines.  Medicine to help with breathing. This may be given through a mask.    Receiving oxygen.  Fluids given through an IV tube.  A ventilator. This may be used to assist with breathing in severe cases.    Follow these instructions at home:  Eating and drinking     Have your child drink enough fluid to keep his or her urine clear or pale yellow.  Do not give food or fluids to your child during a coughing spell, or when breathing seems difficult.  Calming your child     Calm your child during an attack. This will help his or her breathing. To calm your child:    Stay calm.  Gently hold your child to your chest and rub his or her back.  Talk soothingly and calmly to your child.    General instructions     Take your child for a walk at night if the air is cool. Dress your child warmly.  Give over-the-counter and prescription medicines only as told by your child's health care provider. Do not give aspirin because of the association with Reye syndrome.  Place a cool mist vaporizer, humidifier, or steamer in your child's room at night. If a steamer is not available, try having your child sit in a steam-filled room.    To create a steam-filled room, run hot water from your shower or tub and close the bathroom door.  Sit in the room with your child.    Monitor your child's condition carefully. Croup may get worse. An adult should stay with your child in the first few days of this illness.  Keep all follow-up visits as told by your child's health care provider. This is important.  How is this prevented?  ImageHave your child wash his or her hands often with soap and water. If soap and water are not available, use hand . If your child is young, wash his or her hands for her or him.  Have your child avoid contact with people who are sick.  Make sure your child is eating a healthy diet, getting plenty of rest, and drinking plenty of fluids.  Keep your child's immunizations current.  Contact a health care provider if:  Croup lasts more than 7 days.  Your child has a fever.  Get help right away if:  Your child is having trouble breathing or swallowing.  Your child is leaning forward to breathe or is drooling and cannot swallow.  Your child cannot speak or cry.  Your child's breathing is very noisy.  Your child makes a high-pitched or whistling sound when breathing.  The skin between your child's ribs or on the top of your child's chest or neck is being sucked in when your child breathes in.  Your child's chest is being pulled in during breathing.  Your child's lips, fingernails, or skin look bluish (cyanosis).  Your child who is younger than 3 months has a temperature of 100°F (38°C) or higher.  Your child who is one year or younger shows signs of not having enough fluid or water in the body (dehydration), such as:    A sunken soft spot on his or her head.  No wet diapers in 6 hours.  Increased fussiness.    Your child who is one year or older shows signs of dehydration, such as:    No urine in 8–12 hours.  Cracked lips.  Not making tears while crying.  Dry mouth.  Sunken eyes.  Sleepiness.  Weakness.    This information is not intended to replace advice given to you by your health care provider. Make sure you discuss any questions you have with your health care provider.

## 2021-11-05 ENCOUNTER — VIRTUAL VISIT (OUTPATIENT)
Dept: PRIMARY CARE CLINIC | Age: 26
End: 2021-11-05
Payer: COMMERCIAL

## 2021-11-05 DIAGNOSIS — I10 ESSENTIAL HYPERTENSION: ICD-10-CM

## 2021-11-05 DIAGNOSIS — Z13.1 SCREENING FOR DIABETES MELLITUS: ICD-10-CM

## 2021-11-05 DIAGNOSIS — Z11.59 NEED FOR HEPATITIS C SCREENING TEST: Primary | ICD-10-CM

## 2021-11-05 PROCEDURE — 99213 OFFICE O/P EST LOW 20 MIN: CPT | Performed by: NURSE PRACTITIONER

## 2021-11-05 PROCEDURE — G8419 CALC BMI OUT NRM PARAM NOF/U: HCPCS | Performed by: NURSE PRACTITIONER

## 2021-11-05 PROCEDURE — 4004F PT TOBACCO SCREEN RCVD TLK: CPT | Performed by: NURSE PRACTITIONER

## 2021-11-05 PROCEDURE — G8484 FLU IMMUNIZE NO ADMIN: HCPCS | Performed by: NURSE PRACTITIONER

## 2021-11-05 PROCEDURE — G8427 DOCREV CUR MEDS BY ELIG CLIN: HCPCS | Performed by: NURSE PRACTITIONER

## 2021-11-05 RX ORDER — CREATINE MONOHYDRATE 100 %
POWDER (GRAM) MISCELLANEOUS
COMMUNITY
End: 2022-05-25

## 2021-11-05 RX ORDER — CHLORAL HYDRATE 500 MG
3000 CAPSULE ORAL 2 TIMES DAILY
COMMUNITY
End: 2022-05-25

## 2021-11-05 ASSESSMENT — ENCOUNTER SYMPTOMS
ABDOMINAL PAIN: 0
CHEST TIGHTNESS: 0
SHORTNESS OF BREATH: 0
CONSTIPATION: 0
ORTHOPNEA: 0
DIARRHEA: 0

## 2021-11-05 ASSESSMENT — VISUAL ACUITY: OU: 1

## 2021-11-05 NOTE — PROGRESS NOTES
Rayne Martins (:  1995) is a 27 y.o. male,Established patient, here for evaluation of the following chief complaint(s): Hypertension and Medication Check (would like to discuss different OTC supplements he has been taking)         ASSESSMENT/PLAN:  1. Need for hepatitis C screening test  -     Hepatitis C Antibody; Future  2. Screening for diabetes mellitus  -     COMPREHENSIVE METABOLIC PANEL; Future  -     URINALYSIS; Future  -     Protein / Creatinine Ratio, Urine; Future  3. Essential hypertension  -     COMPREHENSIVE METABOLIC PANEL; Future    Taking supplements, will check kidney and liver function. No follow-ups on file. SUBJECTIVE/OBJECTIVE:  Hypertension  This is a chronic problem. The current episode started more than 1 month ago. The problem is controlled. Pertinent negatives include no anxiety, chest pain, headaches, malaise/fatigue, orthopnea, palpitations or shortness of breath. Risk factors for coronary artery disease include smoking/tobacco exposure. Past treatments include calcium channel blockers and lifestyle changes. The current treatment provides significant improvement. Compliance problems include diet. Started taking supplements 4-5 days ago. Taking Creatine 5 gm three days/week, on the days he works out. Does fitness one day/week and Karate 2 days/week. Taking Whey powder 24 gm twice daily on work out days. Super food greens blend ( 2 serv of fruit, vegetable, vit K, C) one scoop/day. Taking fish oil 1,000 mg BID to help treat ADHD. 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 He is concerned because his apple watch is reading a low V02 while exercising. Believe it is related to the volume of oxygen he is taking in while exercising. Advised him to make sure he does not hold his breath while exercising.     His daughter is taking Karate, piano, and girl scouts. Review of Systems   Constitutional: Negative for activity change, fever and malaise/fatigue. HENT: Negative for congestion. Eyes: Negative for visual disturbance. Respiratory: Negative for chest tightness and shortness of breath. Cardiovascular: Negative for chest pain, palpitations, orthopnea and leg swelling. Hypertension   Gastrointestinal: Negative for abdominal pain, constipation and diarrhea. Endocrine: Negative for polyuria. Genitourinary: Negative for dysuria. Musculoskeletal: Negative for arthralgias and myalgias. Skin: Negative for rash. Neurological: Negative for dizziness, light-headedness and headaches. Psychiatric/Behavioral: Negative for agitation, decreased concentration and sleep disturbance. The patient is not nervous/anxious. Anxiety/depression  Panic attacks       Patient-Reported Vitals 11/5/2021   Patient-Reported Weight 150lb   Patient-Reported Height 5'7      Physical exam limited due to virtual visit  Physical Exam  Constitutional:       Appearance: Normal appearance. He is well-developed and well-groomed. HENT:      Head: Normocephalic. Right Ear: Hearing normal.      Left Ear: Hearing normal.   Eyes:      General: Lids are normal. Vision grossly intact. Pulmonary:      Effort: Pulmonary effort is normal.   Neurological:      General: No focal deficit present. Mental Status: He is alert and oriented to person, place, and time. GCS: GCS eye subscore is 4. GCS verbal subscore is 5. GCS motor subscore is 6. Psychiatric:         Attention and Perception: Attention normal.         Mood and Affect: Mood normal.         Speech: Speech normal.         Behavior: Behavior is cooperative.              On this date 11/5/2021 I have spent 25 minutes reviewing previous notes, test results and face to face (virtual) with the patient discussing the diagnosis and importance of compliance with the treatment plan as well as documenting on the day of the visit. Emily Montano, was evaluated through a synchronous (real-time) audio-video encounter. The patient (or guardian if applicable) is aware that this is a billable service. Verbal consent to proceed has been obtained within the past 12 months. The visit was conducted pursuant to the emergency declaration under the [address] [place] [address] authority and the [employer] General Act. Patient identification was verified, and a caregiver was present when appropriate. The patient was located in a state where the provider was credentialed to provide care. An electronic signature was used to authenticate this note.     --Norvel Cheadle, HAMLET - CNP

## 2021-12-09 RX ORDER — LISINOPRIL 5 MG/1
TABLET ORAL
Qty: 90 TABLET | Refills: 1 | Status: SHIPPED | OUTPATIENT
Start: 2021-12-09 | End: 2022-05-25 | Stop reason: SDUPTHER

## 2021-12-09 NOTE — TELEPHONE ENCOUNTER
Medication:   Requested Prescriptions     Pending Prescriptions Disp Refills    lisinopril (PRINIVIL;ZESTRIL) 5 MG tablet [Pharmacy Med Name: LISINOPRIL 5 MG TABLET] 90 tablet 1     Sig: TAKE ONE TABLET BY MOUTH DAILY        Last Filled:      Patient Phone Number: [phone] (home)     Last appt: 11/5/2021   Next appt: 1/6/2022    Last OARRS:   RX Monitoring 8/23/2021   Attestation -   Periodic Controlled Substance Monitoring No signs of potential drug abuse or diversion identified.

## 2022-05-25 ENCOUNTER — OFFICE VISIT (OUTPATIENT)
Dept: PRIMARY CARE CLINIC | Age: 27
End: 2022-05-25
Payer: COMMERCIAL

## 2022-05-25 VITALS
OXYGEN SATURATION: 98 % | WEIGHT: 175.2 LBS | TEMPERATURE: 98.3 F | SYSTOLIC BLOOD PRESSURE: 139 MMHG | HEART RATE: 102 BPM | DIASTOLIC BLOOD PRESSURE: 84 MMHG | BODY MASS INDEX: 27.44 KG/M2

## 2022-05-25 DIAGNOSIS — R51.9 NONINTRACTABLE EPISODIC HEADACHE, UNSPECIFIED HEADACHE TYPE: Primary | ICD-10-CM

## 2022-05-25 PROCEDURE — G8427 DOCREV CUR MEDS BY ELIG CLIN: HCPCS | Performed by: NURSE PRACTITIONER

## 2022-05-25 PROCEDURE — G8419 CALC BMI OUT NRM PARAM NOF/U: HCPCS | Performed by: NURSE PRACTITIONER

## 2022-05-25 PROCEDURE — 4004F PT TOBACCO SCREEN RCVD TLK: CPT | Performed by: NURSE PRACTITIONER

## 2022-05-25 PROCEDURE — 99213 OFFICE O/P EST LOW 20 MIN: CPT | Performed by: NURSE PRACTITIONER

## 2022-05-25 RX ORDER — LISINOPRIL 10 MG/1
TABLET ORAL
Qty: 90 TABLET | Refills: 1 | Status: SHIPPED | OUTPATIENT
Start: 2022-05-25

## 2022-05-25 ASSESSMENT — PATIENT HEALTH QUESTIONNAIRE - PHQ9
9. THOUGHTS THAT YOU WOULD BE BETTER OFF DEAD, OR OF HURTING YOURSELF: 0
2. FEELING DOWN, DEPRESSED OR HOPELESS: 0
SUM OF ALL RESPONSES TO PHQ QUESTIONS 1-9: 3
8. MOVING OR SPEAKING SO SLOWLY THAT OTHER PEOPLE COULD HAVE NOTICED. OR THE OPPOSITE, BEING SO FIGETY OR RESTLESS THAT YOU HAVE BEEN MOVING AROUND A LOT MORE THAN USUAL: 1
4. FEELING TIRED OR HAVING LITTLE ENERGY: 0
SUM OF ALL RESPONSES TO PHQ QUESTIONS 1-9: 3
5. POOR APPETITE OR OVEREATING: 0
SUM OF ALL RESPONSES TO PHQ QUESTIONS 1-9: 3
3. TROUBLE FALLING OR STAYING ASLEEP: 0
6. FEELING BAD ABOUT YOURSELF - OR THAT YOU ARE A FAILURE OR HAVE LET YOURSELF OR YOUR FAMILY DOWN: 0
7. TROUBLE CONCENTRATING ON THINGS, SUCH AS READING THE NEWSPAPER OR WATCHING TELEVISION: 2
SUM OF ALL RESPONSES TO PHQ QUESTIONS 1-9: 3
10. IF YOU CHECKED OFF ANY PROBLEMS, HOW DIFFICULT HAVE THESE PROBLEMS MADE IT FOR YOU TO DO YOUR WORK, TAKE CARE OF THINGS AT HOME, OR GET ALONG WITH OTHER PEOPLE: 1

## 2022-05-25 NOTE — PROGRESS NOTES
Fernando Manjarrez (:  1995) is a 27 y.o. male,Established patient, here for evaluation of the following chief complaint(s):  Check-Up (HTN)         ASSESSMENT/PLAN:  1. Nonintractable episodic headache, unspecified headache type- blood pressure well controlled  -     Tj Ravi MD, Neurology, [facility]  -Continue current medications. No follow-ups on file. 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 Living in [facility] [place] [facility] with daughter, doing well in [facility]     Was seeing a Provider at [facility] seeking assistance for headache and [facility]. Referred  to several places but does not take his insurance. Prescribed topomax at night, had a really bad reaction, in the middle of the night did not know if he was awake or sleeping, very confused. Recommended Elavil but mother has told him he had a bad reaction as a child. Taking breaks from computer screen and hydration is somewhat effective in decreasing headaches. Eye exam was normal.  Concerned hypertension may be contributing factor. Involved in study regarding headaches as a teen. Taking Tylenol extra strength now. Headahces are less frequent. Top of head, c/o presure. Has a problem with obtaining a Psychiatrist to prescribe ADHD medication, prefers Vyvanse. Was previously established with Shala Borrero CNP. Review of Systems   Constitutional: Negative for activity change and fever. HENT: Negative for congestion. Eyes: Negative for visual disturbance. Respiratory: Negative for chest tightness and shortness of breath. Cardiovascular: Negative for chest pain, palpitations and leg swelling. Hypertension   Gastrointestinal: Negative for abdominal pain, constipation and diarrhea. Endocrine: Negative for polyuria. Genitourinary: Negative for dysuria. Musculoskeletal: Negative for arthralgias and myalgias. Skin: Negative for rash.    Neurological: Negative for dizziness, light-headedness and headaches. Psychiatric/Behavioral: Negative for agitation, decreased concentration and sleep disturbance. The patient is not nervous/anxious. Anxiety/depression  Panic attacks          Objective     weight is 175 lb 3.2 oz (79.5 kg). His temporal temperature is 98.3 °F (36.8 °C). His blood pressure is 139/84 and his pulse is 102 (abnormal). His oxygen saturation is 98%. Past Medical History:   Diagnosis Date    Anxiety     Bronchitis     Depression     Insomnia     Panic attacks     Pneumonia 2009    Spondylosis      Past Surgical History:   Procedure Laterality Date    APPENDECTOMY     [facility] Left     Dr. Blaze Fitzgerald. Medial meniscus tear    WISDOM TOOTH EXTRACTION       Physical Exam  Vitals reviewed. Constitutional:       Appearance: He is well-developed. He is not diaphoretic. HENT:      Head: Normocephalic. Right Ear: Hearing and external ear normal. No tenderness. Left Ear: Hearing and external ear normal. No tenderness. Nose: Nose normal.   Eyes:      General: Lids are normal.   Cardiovascular:      Rate and Rhythm: Normal rate and regular rhythm. Heart sounds: Normal heart sounds, S1 normal and S2 normal.   Pulmonary:      Effort: Pulmonary effort is normal.      Breath sounds: Normal breath sounds. Musculoskeletal:         General: Normal range of motion. Lymphadenopathy:      Head:      Right side of head: No submental or submandibular adenopathy. Left side of head: No submental or submandibular adenopathy. Cervical:      Right cervical: No superficial cervical adenopathy. Left cervical: No superficial cervical adenopathy. Skin:     General: Skin is warm and dry. Findings: No rash. Nails: There is no clubbing. Neurological:      Mental Status: He is alert and oriented to person, place, and time. GCS: GCS eye subscore is 4. GCS verbal subscore is 5. GCS motor subscore is 6.    Psychiatric: Speech: Speech normal.         Behavior: Behavior normal. Behavior is cooperative. Judgment: Judgment normal.            On this date 5/25/2022 I have spent 15 minutes reviewing previous notes, test results and face to face with the patient discussing the diagnosis and importance of compliance with the treatment plan as well as documenting on the day of the visit. An electronic signature was used to authenticate this note.     --HAMLET Dsouza - CNP

## 2022-05-30 ASSESSMENT — ENCOUNTER SYMPTOMS
DIARRHEA: 0
CONSTIPATION: 0
ABDOMINAL PAIN: 0
CHEST TIGHTNESS: 0
SHORTNESS OF BREATH: 0

## 2022-10-04 ENCOUNTER — OFFICE VISIT (OUTPATIENT)
Dept: PRIMARY CARE CLINIC | Age: 27
End: 2022-10-04
Payer: COMMERCIAL

## 2022-10-04 VITALS
HEIGHT: 67 IN | HEART RATE: 86 BPM | OXYGEN SATURATION: 98 % | DIASTOLIC BLOOD PRESSURE: 79 MMHG | WEIGHT: 189 LBS | TEMPERATURE: 97.9 F | BODY MASS INDEX: 29.66 KG/M2 | SYSTOLIC BLOOD PRESSURE: 122 MMHG

## 2022-10-04 DIAGNOSIS — Z13.0 SCREENING FOR DEFICIENCY ANEMIA: ICD-10-CM

## 2022-10-04 DIAGNOSIS — R73.03 PREDIABETES: ICD-10-CM

## 2022-10-04 DIAGNOSIS — K21.9 GASTROESOPHAGEAL REFLUX DISEASE WITHOUT ESOPHAGITIS: ICD-10-CM

## 2022-10-04 DIAGNOSIS — I10 ESSENTIAL HYPERTENSION: ICD-10-CM

## 2022-10-04 DIAGNOSIS — I10 ESSENTIAL HYPERTENSION: Primary | ICD-10-CM

## 2022-10-04 LAB
A/G RATIO: 2.1 (ref 1.1–2.2)
ALBUMIN SERPL-MCNC: 4.9 G/DL (ref 3.4–5)
ALP BLD-CCNC: 91 U/L (ref 40–129)
ALT SERPL-CCNC: 37 U/L (ref 10–40)
ANION GAP SERPL CALCULATED.3IONS-SCNC: 12 MMOL/L (ref 3–16)
AST SERPL-CCNC: 24 U/L (ref 15–37)
BASOPHILS ABSOLUTE: 0.1 K/UL (ref 0–0.2)
BASOPHILS RELATIVE PERCENT: 1 %
BILIRUB SERPL-MCNC: 0.3 MG/DL (ref 0–1)
BUN BLDV-MCNC: 14 MG/DL (ref 7–20)
CALCIUM SERPL-MCNC: 9.5 MG/DL (ref 8.3–10.6)
CHLORIDE BLD-SCNC: 99 MMOL/L (ref 99–110)
CO2: 26 MMOL/L (ref 21–32)
CREAT SERPL-MCNC: 0.8 MG/DL (ref 0.9–1.3)
EOSINOPHILS ABSOLUTE: 0.1 K/UL (ref 0–0.6)
EOSINOPHILS RELATIVE PERCENT: 2 %
GFR AFRICAN AMERICAN: >60
GFR NON-AFRICAN AMERICAN: >60
GLUCOSE BLD-MCNC: 85 MG/DL (ref 70–99)
HCT VFR BLD CALC: 41.7 % (ref 40.5–52.5)
HEMOGLOBIN: 14.1 G/DL (ref 13.5–17.5)
LIPASE: 23 U/L (ref 13–60)
LYMPHOCYTES ABSOLUTE: 2 K/UL (ref 1–5.1)
LYMPHOCYTES RELATIVE PERCENT: 32.1 %
MCH RBC QN AUTO: 30 PG (ref 26–34)
MCHC RBC AUTO-ENTMCNC: 33.7 G/DL (ref 31–36)
MCV RBC AUTO: 88.9 FL (ref 80–100)
MONOCYTES ABSOLUTE: 0.8 K/UL (ref 0–1.3)
MONOCYTES RELATIVE PERCENT: 11.9 %
NEUTROPHILS ABSOLUTE: 3.4 K/UL (ref 1.7–7.7)
NEUTROPHILS RELATIVE PERCENT: 53 %
PDW BLD-RTO: 14.4 % (ref 12.4–15.4)
PLATELET # BLD: 323 K/UL (ref 135–450)
PMV BLD AUTO: 9.1 FL (ref 5–10.5)
POTASSIUM SERPL-SCNC: 4.6 MMOL/L (ref 3.5–5.1)
RBC # BLD: 4.7 M/UL (ref 4.2–5.9)
SODIUM BLD-SCNC: 137 MMOL/L (ref 136–145)
TOTAL PROTEIN: 7.2 G/DL (ref 6.4–8.2)
WBC # BLD: 6.4 K/UL (ref 4–11)

## 2022-10-04 PROCEDURE — G8419 CALC BMI OUT NRM PARAM NOF/U: HCPCS | Performed by: NURSE PRACTITIONER

## 2022-10-04 PROCEDURE — 99214 OFFICE O/P EST MOD 30 MIN: CPT | Performed by: NURSE PRACTITIONER

## 2022-10-04 PROCEDURE — 1036F TOBACCO NON-USER: CPT | Performed by: NURSE PRACTITIONER

## 2022-10-04 PROCEDURE — G8484 FLU IMMUNIZE NO ADMIN: HCPCS | Performed by: NURSE PRACTITIONER

## 2022-10-04 PROCEDURE — G8427 DOCREV CUR MEDS BY ELIG CLIN: HCPCS | Performed by: NURSE PRACTITIONER

## 2022-10-04 RX ORDER — OMEPRAZOLE 20 MG/1
20 CAPSULE, DELAYED RELEASE ORAL
Qty: 90 CAPSULE | Refills: 1 | Status: SHIPPED | OUTPATIENT
Start: 2022-10-04

## 2022-10-04 SDOH — ECONOMIC STABILITY: FOOD INSECURITY: WITHIN THE PAST 12 MONTHS, YOU WORRIED THAT YOUR FOOD WOULD RUN OUT BEFORE YOU GOT MONEY TO BUY MORE.: NEVER TRUE

## 2022-10-04 SDOH — ECONOMIC STABILITY: FOOD INSECURITY: WITHIN THE PAST 12 MONTHS, THE FOOD YOU BOUGHT JUST DIDN'T LAST AND YOU DIDN'T HAVE MONEY TO GET MORE.: NEVER TRUE

## 2022-10-04 ASSESSMENT — PATIENT HEALTH QUESTIONNAIRE - PHQ9
1. LITTLE INTEREST OR PLEASURE IN DOING THINGS: 0
SUM OF ALL RESPONSES TO PHQ QUESTIONS 1-9: 0
SUM OF ALL RESPONSES TO PHQ9 QUESTIONS 1 & 2: 0
SUM OF ALL RESPONSES TO PHQ QUESTIONS 1-9: 0
2. FEELING DOWN, DEPRESSED OR HOPELESS: 0

## 2022-10-04 ASSESSMENT — ENCOUNTER SYMPTOMS
DIARRHEA: 0
CONSTIPATION: 0
ABDOMINAL PAIN: 0
WATER BRASH: 1
HEARTBURN: 1
COUGH: 0
SHORTNESS OF BREATH: 0
CHEST TIGHTNESS: 0
NAUSEA: 0
BELCHING: 0

## 2022-10-04 ASSESSMENT — SOCIAL DETERMINANTS OF HEALTH (SDOH): HOW HARD IS IT FOR YOU TO PAY FOR THE VERY BASICS LIKE FOOD, HOUSING, MEDICAL CARE, AND HEATING?: NOT HARD AT ALL

## 2022-10-04 NOTE — PROGRESS NOTES
Matthew Thomas (:  1995) is a 32 y.o. male,Established patient, here for evaluation of the following chief complaint(s):  Heartburn (Has been going on for a long time but has been bothering him lately) and Hypertension         ASSESSMENT/PLAN:  1. Essential hypertension  -     Comprehensive Metabolic Panel; Future  -continue Lisinopril  2. Gastroesophageal reflux disease without esophagitis  -     Lipase; Future  - start    omeprazole (PRILOSEC) 20 MG delayed release capsule; Take 1 capsule by mouth every morning (before breakfast), Disp-90 capsule, R-1Normal  -Remain sitting or 1 hour after eating  -consider referral to GI if symptoms do not improve  3. Screening for deficiency anemia  -     CBC with Auto Differential; Future  4. Prediabetes  -     Hemoglobin A1C; Future    No follow-ups on file. Subjective   SUBJECTIVE/OBJECTIVE:  Gastroesophageal Reflux  He complains of heartburn and water brash. He reports no abdominal pain, no belching, no chest pain, no coughing, no nausea or no tooth decay. This is a chronic problem. The current episode started more than 1 year ago. The problem occurs frequently. The problem has been gradually worsening. The heartburn is of moderate intensity. The heartburn wakes him from sleep. The heartburn does not limit his activity. The heartburn changes with position. The symptoms are aggravated by certain foods. He has tried a histamine-2 antagonist for the symptoms. The treatment provided moderate relief. Today ate Blueberry muffin, 2 hard boiled eggs and glass of water, having symptoms today. Awaking with symptoms, sleeping slightly sitting up. Has always had the symptoms since teen but worsening. Zantac is effective. 40 days without nicotine. Hypertension  This is a chronic problem. The current episode started more than 1 month ago. The problem is controlled.  Pertinent negatives include no anxiety, chest pain, headaches, malaise/fatigue, orthopnea, palpitations or shortness of breath. Risk factors for coronary artery disease include smoking/tobacco exposure. Past treatments include calcium channel blockers and lifestyle changes. The current treatment provides significant improvement. Compliance problems include diet. Review of Systems   Constitutional:  Negative for activity change and fever. HENT:  Negative for congestion. Eyes:  Negative for visual disturbance. Respiratory:  Negative for cough, chest tightness and shortness of breath. Cardiovascular:  Negative for chest pain, palpitations and leg swelling. Hypertension   Gastrointestinal:  Positive for heartburn. Negative for abdominal pain, constipation, diarrhea and nausea. Endocrine: Negative for polyuria. Genitourinary:  Negative for dysuria. Musculoskeletal:  Negative for arthralgias and myalgias. Skin:  Negative for rash. Neurological:  Negative for dizziness, light-headedness and headaches. Psychiatric/Behavioral:  Negative for agitation, decreased concentration and sleep disturbance. The patient is not nervous/anxious. Anxiety/depression  Panic attacks        Objective    height is 5' 7\" (1.702 m) and weight is 189 lb (85.7 kg). His temperature is 97.9 °F (36.6 °C). His blood pressure is 122/79 and his pulse is 86. His oxygen saturation is 98%. BP Readings from Last 3 Encounters:   10/04/22 122/79   05/25/22 139/84   08/05/21 128/86      Wt Readings from Last 3 Encounters:   10/04/22 189 lb (85.7 kg)   05/25/22 175 lb 3.2 oz (79.5 kg)   08/05/21 161 lb (73 kg)      Past Medical History:   Diagnosis Date    Anxiety     Bronchitis     Depression     Insomnia     Panic attacks     Pneumonia 2009    Spondylosis       Past Surgical History:   Procedure Laterality Date    [facility] Left     Dr. Yany Garcia.  Medial meniscus tear    WISDOM TOOTH EXTRACTION        Family History   Problem Relation Age of Onset    Depression Mother     Liver Disease Mother WRIGHT    GERD Mother     GERD Father     Depression Sister     GERD Maternal Grandmother     High Blood Pressure Other       Social History     Tobacco Use    Smoking status: Former     Packs/day: 0.25     Years: 8.00     Pack years: 2.00     Types: Cigarettes     Start date: 7/5/2007     Quit date: 7/3/2019     Years since quitting: 3.2    Smokeless tobacco: Never    Tobacco comments:     using E cig   Vaping Use    Vaping Use: Former   Substance Use Topics    Alcohol use: No     Alcohol/week: 0.0 standard drinks     Comment: occ    Drug use: Not Currently     Types: Marijuana Josseline Forte)     Comment: last use of marijuana and pain pill 3 years ago      Outpatient Encounter Medications as of 10/4/2022   Medication Sig Dispense Refill    omeprazole (PRILOSEC) 20 MG delayed release capsule Take 1 capsule by mouth every morning (before breakfast) 90 capsule 1    lisinopril (PRINIVIL;ZESTRIL) 10 MG tablet TAKE ONE TABLET BY MOUTH DAILY 90 tablet 1     No facility-administered encounter medications on file as of 10/4/2022. Physical Exam  Vitals reviewed. Constitutional:       Appearance: He is well-developed. He is not diaphoretic. HENT:      Head: Normocephalic. Right Ear: Hearing and external ear normal. No tenderness. Left Ear: Hearing and external ear normal. No tenderness. Nose: Nose normal.   Eyes:      General: Lids are normal.   Cardiovascular:      Rate and Rhythm: Normal rate and regular rhythm. Heart sounds: Normal heart sounds, S1 normal and S2 normal.   Pulmonary:      Effort: Pulmonary effort is normal.      Breath sounds: Normal breath sounds. Abdominal:      General: Bowel sounds are normal. There is no distension. Palpations: Abdomen is soft. There is no mass. Tenderness: There is no right CVA tenderness, left CVA tenderness or rebound. Hernia: No hernia is present. Musculoskeletal:         General: Normal range of motion.    Lymphadenopathy:      Head: Right side of head: No submental or submandibular adenopathy. Left side of head: No submental or submandibular adenopathy. Cervical:      Right cervical: No superficial cervical adenopathy. Left cervical: No superficial cervical adenopathy. Skin:     General: Skin is warm and dry. Findings: No rash. Nails: There is no clubbing. Neurological:      Mental Status: He is alert and oriented to person, place, and time. GCS: GCS eye subscore is 4. GCS verbal subscore is 5. GCS motor subscore is 6. Psychiatric:         Speech: Speech normal.         Behavior: Behavior normal. Behavior is cooperative. Judgment: Judgment normal.          On this date 10/4/2022 I have spent 15 minutes reviewing previous notes, test results and face to face with the patient discussing the diagnosis and importance of compliance with the treatment plan as well as documenting on the day of the visit. An electronic signature was used to authenticate this note.     --HAMLET Flores - CNP

## 2022-10-05 LAB
ESTIMATED AVERAGE GLUCOSE: 114 MG/DL
HBA1C MFR BLD: 5.6 %

## 2022-12-29 ENCOUNTER — TELEPHONE (OUTPATIENT)
Dept: PRIMARY CARE CLINIC | Age: 27
End: 2022-12-29

## 2023-01-24 RX ORDER — LISINOPRIL 10 MG/1
TABLET ORAL
Qty: 90 TABLET | Refills: 0 | Status: SHIPPED | OUTPATIENT
Start: 2023-01-24

## 2023-03-08 ENCOUNTER — OFFICE VISIT (OUTPATIENT)
Dept: PRIMARY CARE CLINIC | Age: 28
End: 2023-03-08
Payer: COMMERCIAL

## 2023-03-08 VITALS
TEMPERATURE: 97.7 F | WEIGHT: 179.4 LBS | HEART RATE: 104 BPM | DIASTOLIC BLOOD PRESSURE: 83 MMHG | BODY MASS INDEX: 28.1 KG/M2 | SYSTOLIC BLOOD PRESSURE: 159 MMHG | OXYGEN SATURATION: 97 %

## 2023-03-08 DIAGNOSIS — F90.2 ADHD (ATTENTION DEFICIT HYPERACTIVITY DISORDER), COMBINED TYPE: Primary | ICD-10-CM

## 2023-03-08 DIAGNOSIS — F41.0 ANXIETY ATTACK: ICD-10-CM

## 2023-03-08 DIAGNOSIS — E78.2 MIXED HYPERLIPIDEMIA: ICD-10-CM

## 2023-03-08 PROCEDURE — G8427 DOCREV CUR MEDS BY ELIG CLIN: HCPCS | Performed by: FAMILY MEDICINE

## 2023-03-08 PROCEDURE — 3079F DIAST BP 80-89 MM HG: CPT | Performed by: FAMILY MEDICINE

## 2023-03-08 PROCEDURE — G8419 CALC BMI OUT NRM PARAM NOF/U: HCPCS | Performed by: FAMILY MEDICINE

## 2023-03-08 PROCEDURE — 1036F TOBACCO NON-USER: CPT | Performed by: FAMILY MEDICINE

## 2023-03-08 PROCEDURE — 99214 OFFICE O/P EST MOD 30 MIN: CPT | Performed by: FAMILY MEDICINE

## 2023-03-08 PROCEDURE — 3077F SYST BP >= 140 MM HG: CPT | Performed by: FAMILY MEDICINE

## 2023-03-08 PROCEDURE — G8484 FLU IMMUNIZE NO ADMIN: HCPCS | Performed by: FAMILY MEDICINE

## 2023-03-08 RX ORDER — BUSPIRONE HYDROCHLORIDE 5 MG/1
5 TABLET ORAL 3 TIMES DAILY
Qty: 90 TABLET | Refills: 0 | Status: SHIPPED | OUTPATIENT
Start: 2023-03-08 | End: 2023-04-07

## 2023-03-08 SDOH — ECONOMIC STABILITY: HOUSING INSECURITY
IN THE LAST 12 MONTHS, WAS THERE A TIME WHEN YOU DID NOT HAVE A STEADY PLACE TO SLEEP OR SLEPT IN A SHELTER (INCLUDING NOW)?: PATIENT REFUSED

## 2023-03-08 SDOH — HEALTH STABILITY: PHYSICAL HEALTH: ON AVERAGE, HOW MANY MINUTES DO YOU ENGAGE IN EXERCISE AT THIS LEVEL?: 150+ MIN

## 2023-03-08 SDOH — ECONOMIC STABILITY: INCOME INSECURITY: HOW HARD IS IT FOR YOU TO PAY FOR THE VERY BASICS LIKE FOOD, HOUSING, MEDICAL CARE, AND HEATING?: PATIENT DECLINED

## 2023-03-08 SDOH — HEALTH STABILITY: PHYSICAL HEALTH: ON AVERAGE, HOW MANY DAYS PER WEEK DO YOU ENGAGE IN MODERATE TO STRENUOUS EXERCISE (LIKE A BRISK WALK)?: 4 DAYS

## 2023-03-08 SDOH — ECONOMIC STABILITY: FOOD INSECURITY: WITHIN THE PAST 12 MONTHS, THE FOOD YOU BOUGHT JUST DIDN'T LAST AND YOU DIDN'T HAVE MONEY TO GET MORE.: PATIENT DECLINED

## 2023-03-08 SDOH — ECONOMIC STABILITY: FOOD INSECURITY: WITHIN THE PAST 12 MONTHS, YOU WORRIED THAT YOUR FOOD WOULD RUN OUT BEFORE YOU GOT MONEY TO BUY MORE.: PATIENT DECLINED

## 2023-03-08 ASSESSMENT — PATIENT HEALTH QUESTIONNAIRE - PHQ9
DEPRESSION UNABLE TO ASSESS: PT REFUSES
SUM OF ALL RESPONSES TO PHQ QUESTIONS 1-9: 0
1. LITTLE INTEREST OR PLEASURE IN DOING THINGS: 0
3. TROUBLE FALLING OR STAYING ASLEEP: 0
5. POOR APPETITE OR OVEREATING: 0
6. FEELING BAD ABOUT YOURSELF - OR THAT YOU ARE A FAILURE OR HAVE LET YOURSELF OR YOUR FAMILY DOWN: 0
7. TROUBLE CONCENTRATING ON THINGS, SUCH AS READING THE NEWSPAPER OR WATCHING TELEVISION: 0
4. FEELING TIRED OR HAVING LITTLE ENERGY: 0
SUM OF ALL RESPONSES TO PHQ QUESTIONS 1-9: 0
8. MOVING OR SPEAKING SO SLOWLY THAT OTHER PEOPLE COULD HAVE NOTICED. OR THE OPPOSITE, BEING SO FIGETY OR RESTLESS THAT YOU HAVE BEEN MOVING AROUND A LOT MORE THAN USUAL: 0
SUM OF ALL RESPONSES TO PHQ QUESTIONS 1-9: 0
SUM OF ALL RESPONSES TO PHQ9 QUESTIONS 1 & 2: 0
2. FEELING DOWN, DEPRESSED OR HOPELESS: 0
10. IF YOU CHECKED OFF ANY PROBLEMS, HOW DIFFICULT HAVE THESE PROBLEMS MADE IT FOR YOU TO DO YOUR WORK, TAKE CARE OF THINGS AT HOME, OR GET ALONG WITH OTHER PEOPLE: 0
9. THOUGHTS THAT YOU WOULD BE BETTER OFF DEAD, OR OF HURTING YOURSELF: 0
SUM OF ALL RESPONSES TO PHQ QUESTIONS 1-9: 0

## 2023-03-08 ASSESSMENT — ENCOUNTER SYMPTOMS
DIARRHEA: 0
VOMITING: 0
NAUSEA: 0
CONSTIPATION: 0
COUGH: 0
SHORTNESS OF BREATH: 0

## 2023-03-08 NOTE — PROGRESS NOTES
Dayne Abel (:  1995) is a 32 y.o. male,Established patient, here for evaluation of the following chief complaint(s):  Established New Doctor      SUBJECTIVE:  3.8.23: new to this physician    Having issues with anxiety. Previously was on Vyvanse 20-40 mg (he is unsure which) and was taking twice per day. But he had to stop that medication whenhe switched insurances. He states that whenhe was on that medicine it felt like a fog was lifted and he had much less anxiety than he does now. Went back to smoking/vaping, after not being on it for a while. D/c'ed caffeine first, and then now working on once again quitting vaping    Recently he went from working from home to working as an [employer] . Working 9 hours per day, 4 days per week. He walks a lot and it is very stressful for him. Family hx: Mother taking blood pressure med and bipolar medicine, father takes cholesterol lowering medicine    Patient was told he could be bipolar and tried a bipolar medication, tried prozac, tried zyprexa -- either felt like a zombie or like he could not sleep. Was found to not be bipolar, but to have adhd and anxiety with panic attacks. Plan for today:  Start vyvanse again at 20 mg daily  Start buspar 5 mg TID PRN  Follow in 3 weeks to see if we need to increase the vyvanse, and to potentially add back on the Prozac which he was taking in the past with good results      Heartburn (Has been going on for a long time but has been bothering him lately) and Hypertension  -- continue omeprazole    Prediabetes -- no meds           I have reviewed the chart notes available from myself and other providers.  I have reviewed and addressed all active problems and created or updated the problems list in detail, as needed    I have extensively reviewed and reconciled the medication list, discontinued medications not taking or no longer appropriate, and updated the active meds list    OBJECTIVE:  Review of Systems   Constitutional:  Negative for chills and fever. Respiratory:  Negative for cough and shortness of breath. Cardiovascular:  Negative for leg swelling. Gastrointestinal:  Negative for constipation, diarrhea, nausea and vomiting. Endocrine: Negative for polyuria. Genitourinary:  Negative for frequency. Skin:  Negative for rash. Psychiatric/Behavioral:  The patient is nervous/anxious. Vitals:    03/08/23 1239   BP: (!) 159/83   Site: Right Upper Arm   Position: Sitting   Cuff Size: Medium Adult   Pulse: (!) 104   Temp: 97.7 °F (36.5 °C)   SpO2: 97%   Weight: 179 lb 6.4 oz (81.4 kg)      Body mass index is 28.1 kg/m². Physical Exam  Constitutional:       Appearance: Normal appearance. Cardiovascular:      Rate and Rhythm: Normal rate and regular rhythm. Pulses: Normal pulses. Heart sounds: Normal heart sounds. Pulmonary:      Effort: Pulmonary effort is normal.      Breath sounds: Normal breath sounds. Musculoskeletal:      Right lower leg: No edema. Left lower leg: No edema. Neurological:      General: No focal deficit present. Mental Status: He is alert. Mental status is at baseline. Psychiatric:         Mood and Affect: Mood normal. Affect is tearful. Speech: Speech is rapid and pressured. Behavior: Behavior normal.         Thought Content:  Thought content normal.         Lab Results   Component Value Date    LABA1C 5.6 10/04/2022     Lab Results   Component Value Date    WBC 6.4 10/04/2022    HGB 14.1 10/04/2022    HCT 41.7 10/04/2022    MCV 88.9 10/04/2022     10/04/2022      Lab Results   Component Value Date/Time     10/04/2022 10:43 AM    K 4.6 10/04/2022 10:43 AM    K 4.5 01/19/2021 11:22 AM    CL 99 10/04/2022 10:43 AM    CO2 26 10/04/2022 10:43 AM    BUN 14 10/04/2022 10:43 AM    CREATININE 0.8 10/04/2022 10:43 AM    GLUCOSE 85 10/04/2022 10:43 AM    CALCIUM 9.5 10/04/2022 10:43 AM       Lab Results   Component Value Date    CHOL 236 (H) 05/10/2021     Lab Results   Component Value Date    TRIG 101 05/10/2021     Lab Results   Component Value Date    HDL 62 (H) 05/10/2021     Lab Results   Component Value Date    LDLCALC 154 (H) 05/10/2021     Lab Results   Component Value Date    LABVLDL 20 05/10/2021     No results found for: CHOLHDLRATIO     The ASCVD Risk score (Remigio EVANS, et al., 2019) failed to calculate for the following reasons: The 2019 ASCVD risk score is only valid for ages 36 to 78     Patient received counseling and, if relevant, printed instructions for all symptoms listed in CC and HPI, as well as for all diagnoses brought onto today's visit note below. Typical counseling includes, but is not limited to, non-pharmacologic measures to manage listed symptoms and conditions; appropriate use, risks and benefits for all prescribed medications; potential interactions between medications both prescribed and OTC; diet; exercise; healthy behaviors; and goalsetting to improve health. Patient or responsible party was involved in shared decision making and had opportunity to have all questions answered. Except as noted below, all chronic problems have been reviewed and are stable to continue medications or other therapy as previously documented in the patient's chart, with changes per orders or documentation below:    1. ADHD (attention deficit hyperactivity disorder), combined type  -     Lisdexamfetamine Dimesylate (VYVANSE) 20 MG CAPS; Take 1 capsule by mouth daily for 30 days. Max Daily Amount: 20 mg, Disp-30 capsule, R-0Normal  2. Mixed hyperlipidemia  -     Lipid Panel; Future  3. Anxiety attack  -     busPIRone (BUSPAR) 5 MG tablet;  Take 1 tablet by mouth 3 times daily, Disp-90 tablet, R-0Normal        Problem List          Unprioritized    Mixed hyperlipidemia    Relevant Medications    lisinopril (PRINIVIL;ZESTRIL) 10 MG tablet    Other Relevant Orders    Lipid Panel     Orders Placed This Encounter   Procedures Lipid Panel     Standing Status:   Future     Standing Expiration Date:   3/8/2024       Return in about 3 weeks (around 3/29/2023) for adhd follow up, lab check up. Dr. Nereida Magana and [facility]        Usual doctor's hours are:       Monday 7:00 am to 5:30 pm  Wednesday 7:00 am to 4:30 pm  Thursday 7:00 am to 4:30 pm  Friday 7:00 am to 3:30 pm  Saturdays, Sundays, and after hours: E-Visits are available    We observe most federal holidays and Good Friday. We ask that you only contact the office one time per issue or question, and please allow one full business day for a call back. Calling us back multiple times keeps us from being able to complete the work efficiently for you and our other patients. For medication renewals, please call your pharmacist to contact us, and be sure to allow at least 3 business days for processing before you need to  your medication. If you are sick or need an appointment that hasn't been planned, same day appointments are available every day the office is open: Monday, Tuesday, Wednesday, Thursday, and Friday. Call during office hours to schedule, even if it may not be with your regular physician. You may also call the office after 8 am on office days if you need to be seen from an issue the night before. During hours when the office is not normally open, your call will go to the messaging service which cannot provide any service other than paging the doctor. No prescriptions or other nonurgent matters will be handled and no voicemail is available, so please call back during office hours for these matters.        Electronically signed by Eugenio Mercer DO on 3/8/2023 at 4:18 PM.

## 2023-03-09 ENCOUNTER — TELEPHONE (OUTPATIENT)
Dept: PRIMARY CARE CLINIC | Age: 28
End: 2023-03-09

## 2023-03-10 NOTE — TELEPHONE ENCOUNTER
Patient had a question about Vyanse and he took it way early and now he will take it a little later patient states that he was going to take two  but decided not he read the instruction so, he will just take one and follow up with Dr Leslie De La Cruz  on 3/29/23

## 2023-03-15 ENCOUNTER — TELEMEDICINE (OUTPATIENT)
Dept: PRIMARY CARE CLINIC | Age: 28
End: 2023-03-15
Payer: COMMERCIAL

## 2023-03-15 DIAGNOSIS — R73.03 PREDIABETES: Chronic | ICD-10-CM

## 2023-03-15 DIAGNOSIS — R42 SPELL OF DIZZINESS: Primary | ICD-10-CM

## 2023-03-15 DIAGNOSIS — F41.0 ANXIETY ATTACK: Chronic | ICD-10-CM

## 2023-03-15 PROCEDURE — G8427 DOCREV CUR MEDS BY ELIG CLIN: HCPCS | Performed by: FAMILY MEDICINE

## 2023-03-15 PROCEDURE — 99214 OFFICE O/P EST MOD 30 MIN: CPT | Performed by: FAMILY MEDICINE

## 2023-03-15 ASSESSMENT — ENCOUNTER SYMPTOMS
VOMITING: 0
CONSTIPATION: 0
SHORTNESS OF BREATH: 0
NAUSEA: 0
DIARRHEA: 0
COUGH: 0

## 2023-03-15 NOTE — LETTER
March 15, 2023       Ricky Carlin YOB: 1995   [id number] Date of Visit:  3/15/2023       To Whom It May Concern: It is my medical opinion that Ricky Carlin may return to work on 3.21.23 with no restrictions. His first day off work was 3.14.23, and the time off was and is due to side effects from prescribed medications. If you have any questions or concerns, please do not hesitate to call.     Sincerely,        Wade Kline, DO

## 2023-03-15 NOTE — PROGRESS NOTES
Kavin Lopez (:  1995) is a Established patient, here for evaluation of the following:    Assessment & Plan   Below is the assessment and plan developed based on review of pertinent history, physical exam, labs, studies, and medications. 1. Spell of dizziness  Comments:  due to side effect of buspar. 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 Anxiety attack  Comments:  continue buspar  3. Prediabetes  Comments:  will check A1c  Orders:  -     Hemoglobin A1C; Future  Return in about 2 weeks (around 3/29/2023). Subjective   Patient had episodes of dizziness while he was working, which coincided with the first few days that he had started his Vyvanse and Buspar. Had previously taken Vyvanse and did not have this side effect, so he believes the Buspar was the cause. Had felt the buspar working right away on day one, feels like it is working still and also feels like the dizziness episodes are now improving. 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 His first day off work was 3.14.23, and he may return to work on 3.21.23 without any reservations. Review of Systems   Constitutional:  Negative for chills and fever. Respiratory:  Negative for cough and shortness of breath. Cardiovascular:  Negative for leg swelling. Gastrointestinal:  Negative for constipation, diarrhea, nausea and vomiting. Endocrine: Negative for polyuria. Genitourinary:  Negative for frequency. Skin:  Negative for rash. Neurological:  Positive for dizziness.         Objective   Patient-Reported Vitals  No data recorded     Physical Exam  [INSTRUCTIONS:  \"[x]\" Indicates a positive item  \"[]\" Indicates a negative item  -- DELETE ALL ITEMS NOT EXAMINED]    Constitutional: [x] Appears well-developed and well-nourished [x] No apparent distress      [] Abnormal -     Mental status: [x] Alert and awake [x] Oriented to person/place/time [x] Able to follow commands    [] Abnormal -     Eyes:   EOM    [x]  Normal    [] Abnormal -   Sclera  [x]  Normal    [] Abnormal -          Discharge [x]  None visible   [] Abnormal -     HENT: [x] Normocephalic, atraumatic  [] Abnormal -   [x] Mouth/Throat: Mucous membranes are moist    External Ears [x] Normal  [] Abnormal -    Neck: [x] No visualized mass [] Abnormal -     Pulmonary/Chest: [x] Respiratory effort normal   [x] No visualized signs of difficulty breathing or respiratory distress        [] Abnormal -      Musculoskeletal:   [x] Normal gait with no signs of ataxia         [x] Normal range of motion of neck        [] Abnormal -     Neurological:        [x] No Facial Asymmetry (Cranial nerve 7 motor function) (limited exam due to video visit)          [x] No gaze palsy        [] Abnormal -          Skin:        [x] No significant exanthematous lesions or discoloration noted on facial skin         [] Abnormal -            Psychiatric:       [x] Normal Affect [] Abnormal -        [x] No Hallucinations    Other pertinent observable physical exam findings:-             Tracy Gross, was evaluated through a synchronous (real-time) audio-video encounter. The patient (or guardian if applicable) is aware that this is a billable service, which includes applicable co-pays. This Virtual Visit was conducted with patient's (and/or legal guardian's) consent. The visit was conducted pursuant to the emergency declaration under the [address] [place] [address], [address] [place] [address] and the [employer] and [employer] General Act. Patient identification was verified, and a caregiver was present when appropriate.    The patient was located [facility] [address] [place] [phone]  Provider was located at [facility] (Appt Dept): [address] [phone], DO

## 2023-03-29 ENCOUNTER — OFFICE VISIT (OUTPATIENT)
Dept: PRIMARY CARE CLINIC | Age: 28
End: 2023-03-29
Payer: COMMERCIAL

## 2023-03-29 VITALS
HEART RATE: 88 BPM | WEIGHT: 175.4 LBS | TEMPERATURE: 97.5 F | SYSTOLIC BLOOD PRESSURE: 142 MMHG | DIASTOLIC BLOOD PRESSURE: 85 MMHG | BODY MASS INDEX: 27.47 KG/M2 | OXYGEN SATURATION: 98 %

## 2023-03-29 DIAGNOSIS — F90.2 ADHD (ATTENTION DEFICIT HYPERACTIVITY DISORDER), COMBINED TYPE: Primary | Chronic | ICD-10-CM

## 2023-03-29 DIAGNOSIS — E78.2 MODERATE MIXED HYPERLIPIDEMIA NOT REQUIRING STATIN THERAPY: ICD-10-CM

## 2023-03-29 DIAGNOSIS — I10 ESSENTIAL HYPERTENSION: Chronic | ICD-10-CM

## 2023-03-29 DIAGNOSIS — F41.0 ANXIETY ATTACK: Chronic | ICD-10-CM

## 2023-03-29 PROBLEM — R00.0 TACHYCARDIA: Status: RESOLVED | Noted: 2021-08-05 | Resolved: 2023-03-29

## 2023-03-29 PROCEDURE — 1036F TOBACCO NON-USER: CPT | Performed by: FAMILY MEDICINE

## 2023-03-29 PROCEDURE — 3074F SYST BP LT 130 MM HG: CPT | Performed by: FAMILY MEDICINE

## 2023-03-29 PROCEDURE — 3078F DIAST BP <80 MM HG: CPT | Performed by: FAMILY MEDICINE

## 2023-03-29 PROCEDURE — G8419 CALC BMI OUT NRM PARAM NOF/U: HCPCS | Performed by: FAMILY MEDICINE

## 2023-03-29 PROCEDURE — G8484 FLU IMMUNIZE NO ADMIN: HCPCS | Performed by: FAMILY MEDICINE

## 2023-03-29 PROCEDURE — 99214 OFFICE O/P EST MOD 30 MIN: CPT | Performed by: FAMILY MEDICINE

## 2023-03-29 PROCEDURE — G8427 DOCREV CUR MEDS BY ELIG CLIN: HCPCS | Performed by: FAMILY MEDICINE

## 2023-03-29 RX ORDER — BUSPIRONE HYDROCHLORIDE 5 MG/1
5 TABLET ORAL 3 TIMES DAILY
Qty: 90 TABLET | Refills: 0 | Status: SHIPPED | OUTPATIENT
Start: 2023-03-29 | End: 2023-04-28

## 2023-03-29 RX ORDER — LISINOPRIL 20 MG/1
TABLET ORAL
Qty: 90 TABLET | Refills: 1 | Status: SHIPPED | OUTPATIENT
Start: 2023-03-29

## 2023-03-29 ASSESSMENT — ENCOUNTER SYMPTOMS
SHORTNESS OF BREATH: 0
CONSTIPATION: 0
COUGH: 0
DIARRHEA: 0
NAUSEA: 0
VOMITING: 0

## 2023-03-29 NOTE — PROGRESS NOTES
pm  Saturdays, Sundays, and after hours: E-Visits are available    We observe most federal holidays and Good Friday. We ask that you only contact the office one time per issue or question, and please allow one full business day for a call back. Calling us back multiple times keeps us from being able to complete the work efficiently for you and our other patients. For medication renewals, please call your pharmacist to contact us, and be sure to allow at least 3 business days for processing before you need to  your medication. If you are sick or need an appointment that hasn't been planned, same day appointments are available every day the office is open: Monday, Tuesday, Wednesday, Thursday, and Friday. Call during office hours to schedule, even if it may not be with your regular physician. You may also call the office after 8 am on office days if you need to be seen from an issue the night before. During hours when the office is not normally open, your call will go to the messaging service which cannot provide any service other than paging the doctor. No prescriptions or other nonurgent matters will be handled and no voicemail is available, so please call back during office hours for these matters.        Electronically signed by Eugenio Mercer DO on 3/29/2023 at 1:30 PM.

## 2023-04-05 PROBLEM — R00.2 INTERMITTENT PALPITATIONS: Chronic | Status: ACTIVE | Noted: 2023-04-05

## 2023-04-05 PROBLEM — G43.009 MIGRAINE WITHOUT AURA AND WITHOUT STATUS MIGRAINOSUS, NOT INTRACTABLE: Chronic | Status: ACTIVE | Noted: 2023-04-05

## 2023-04-20 ENCOUNTER — OFFICE VISIT (OUTPATIENT)
Dept: PRIMARY CARE CLINIC | Age: 28
End: 2023-04-20

## 2023-04-20 VITALS
BODY MASS INDEX: 26.63 KG/M2 | WEIGHT: 170 LBS | SYSTOLIC BLOOD PRESSURE: 130 MMHG | TEMPERATURE: 97.2 F | HEART RATE: 80 BPM | DIASTOLIC BLOOD PRESSURE: 84 MMHG | OXYGEN SATURATION: 99 %

## 2023-04-20 DIAGNOSIS — I10 ESSENTIAL HYPERTENSION: Chronic | ICD-10-CM

## 2023-04-20 DIAGNOSIS — F41.0 ANXIETY ATTACK: Chronic | ICD-10-CM

## 2023-04-20 DIAGNOSIS — F90.2 ADHD (ATTENTION DEFICIT HYPERACTIVITY DISORDER), COMBINED TYPE: Chronic | ICD-10-CM

## 2023-04-20 DIAGNOSIS — R00.2 INTERMITTENT PALPITATIONS: Primary | Chronic | ICD-10-CM

## 2023-04-20 DIAGNOSIS — K21.9 GASTROESOPHAGEAL REFLUX DISEASE WITHOUT ESOPHAGITIS: ICD-10-CM

## 2023-04-20 RX ORDER — BUSPIRONE HYDROCHLORIDE 5 MG/1
5 TABLET ORAL 3 TIMES DAILY
Qty: 90 TABLET | Refills: 0 | Status: SHIPPED | OUTPATIENT
Start: 2023-04-20 | End: 2023-05-20

## 2023-04-20 RX ORDER — PROPRANOLOL HCL 60 MG
60 CAPSULE, EXTENDED RELEASE 24HR ORAL DAILY
Qty: 90 CAPSULE | Refills: 0 | Status: SHIPPED | OUTPATIENT
Start: 2023-04-20

## 2023-04-20 RX ORDER — OMEPRAZOLE 20 MG/1
20 CAPSULE, DELAYED RELEASE ORAL
Qty: 90 CAPSULE | Refills: 1 | Status: SHIPPED | OUTPATIENT
Start: 2023-04-20

## 2023-04-20 SDOH — ECONOMIC STABILITY: FOOD INSECURITY: WITHIN THE PAST 12 MONTHS, THE FOOD YOU BOUGHT JUST DIDN'T LAST AND YOU DIDN'T HAVE MONEY TO GET MORE.: NEVER TRUE

## 2023-04-20 SDOH — ECONOMIC STABILITY: FOOD INSECURITY: WITHIN THE PAST 12 MONTHS, YOU WORRIED THAT YOUR FOOD WOULD RUN OUT BEFORE YOU GOT MONEY TO BUY MORE.: NEVER TRUE

## 2023-04-20 SDOH — ECONOMIC STABILITY: INCOME INSECURITY: HOW HARD IS IT FOR YOU TO PAY FOR THE VERY BASICS LIKE FOOD, HOUSING, MEDICAL CARE, AND HEATING?: PATIENT DECLINED

## 2023-04-20 ASSESSMENT — ENCOUNTER SYMPTOMS
CONSTIPATION: 0
NAUSEA: 0
DIARRHEA: 0
SHORTNESS OF BREATH: 0
COUGH: 0
VOMITING: 0

## 2023-04-20 NOTE — PROGRESS NOTES
frequency. Skin:  Negative for rash. Vitals:    04/20/23 1642   BP: 130/84   Site: Right Upper Arm   Position: Sitting   Cuff Size: Medium Adult   Pulse: 80   Temp: 97.2 °F (36.2 °C)   SpO2: 99%   Weight: 170 lb (77.1 kg)        Body mass index is 26.63 kg/m². Physical Exam  Constitutional:       Appearance: Normal appearance. Cardiovascular:      Rate and Rhythm: Normal rate and regular rhythm. Pulses: Normal pulses. Heart sounds: Normal heart sounds. Pulmonary:      Effort: Pulmonary effort is normal.      Breath sounds: Normal breath sounds. Musculoskeletal:      Right lower leg: No edema. Left lower leg: No edema. Neurological:      General: No focal deficit present. Mental Status: He is alert. Mental status is at baseline. Psychiatric:         Attention and Perception: Attention and perception normal.         Mood and Affect: Mood normal.         Speech: Speech normal.         Behavior: Behavior normal. Behavior is cooperative. Thought Content:  Thought content normal.         Cognition and Memory: Cognition normal.         Judgment: Judgment normal.         Lab Results   Component Value Date    LABA1C 5.6 10/04/2022     Lab Results   Component Value Date    WBC 6.4 10/04/2022    HGB 14.1 10/04/2022    HCT 41.7 10/04/2022    MCV 88.9 10/04/2022     10/04/2022      Lab Results   Component Value Date/Time     10/04/2022 10:43 AM    K 4.6 10/04/2022 10:43 AM    K 4.5 01/19/2021 11:22 AM    CL 99 10/04/2022 10:43 AM    CO2 26 10/04/2022 10:43 AM    BUN 14 10/04/2022 10:43 AM    CREATININE 0.8 10/04/2022 10:43 AM    GLUCOSE 85 10/04/2022 10:43 AM    CALCIUM 9.5 10/04/2022 10:43 AM       Lab Results   Component Value Date    CHOL 236 (H) 05/10/2021     Lab Results   Component Value Date    TRIG 101 05/10/2021     Lab Results   Component Value Date    HDL 62 (H) 05/10/2021     Lab Results   Component Value Date    LDLCALC 154 (H) 05/10/2021     Lab Results

## 2023-04-21 ENCOUNTER — TELEPHONE (OUTPATIENT)
Dept: PRIMARY CARE CLINIC | Age: 28
End: 2023-04-21

## 2023-04-21 DIAGNOSIS — F90.2 ADHD (ATTENTION DEFICIT HYPERACTIVITY DISORDER), COMBINED TYPE: Primary | Chronic | ICD-10-CM

## 2023-04-30 DIAGNOSIS — F41.0 ANXIETY ATTACK: Chronic | ICD-10-CM

## 2023-05-02 DIAGNOSIS — G43.009 MIGRAINE WITHOUT AURA AND WITHOUT STATUS MIGRAINOSUS, NOT INTRACTABLE: Chronic | ICD-10-CM

## 2023-05-02 DIAGNOSIS — R00.2 INTERMITTENT PALPITATIONS: Chronic | ICD-10-CM

## 2023-05-04 RX ORDER — BUSPIRONE HYDROCHLORIDE 5 MG/1
TABLET ORAL
Qty: 90 TABLET | Refills: 0 | OUTPATIENT
Start: 2023-05-04

## 2023-05-04 RX ORDER — PROPRANOLOL HYDROCHLORIDE 120 MG/1
CAPSULE, EXTENDED RELEASE ORAL
Qty: 30 CAPSULE | Refills: 0 | OUTPATIENT
Start: 2023-05-04

## 2023-05-11 ENCOUNTER — OFFICE VISIT (OUTPATIENT)
Dept: PRIMARY CARE CLINIC | Age: 28
End: 2023-05-11
Payer: COMMERCIAL

## 2023-05-11 VITALS
TEMPERATURE: 97.6 F | HEART RATE: 78 BPM | SYSTOLIC BLOOD PRESSURE: 131 MMHG | BODY MASS INDEX: 26.56 KG/M2 | OXYGEN SATURATION: 99 % | DIASTOLIC BLOOD PRESSURE: 82 MMHG | WEIGHT: 169.6 LBS

## 2023-05-11 DIAGNOSIS — R00.2 INTERMITTENT PALPITATIONS: Chronic | ICD-10-CM

## 2023-05-11 DIAGNOSIS — F90.2 ADHD (ATTENTION DEFICIT HYPERACTIVITY DISORDER), COMBINED TYPE: Primary | Chronic | ICD-10-CM

## 2023-05-11 DIAGNOSIS — F90.2 ADHD (ATTENTION DEFICIT HYPERACTIVITY DISORDER), COMBINED TYPE: ICD-10-CM

## 2023-05-11 PROCEDURE — 3075F SYST BP GE 130 - 139MM HG: CPT | Performed by: FAMILY MEDICINE

## 2023-05-11 PROCEDURE — 1036F TOBACCO NON-USER: CPT | Performed by: FAMILY MEDICINE

## 2023-05-11 PROCEDURE — G8427 DOCREV CUR MEDS BY ELIG CLIN: HCPCS | Performed by: FAMILY MEDICINE

## 2023-05-11 PROCEDURE — 99214 OFFICE O/P EST MOD 30 MIN: CPT | Performed by: FAMILY MEDICINE

## 2023-05-11 PROCEDURE — 3079F DIAST BP 80-89 MM HG: CPT | Performed by: FAMILY MEDICINE

## 2023-05-11 PROCEDURE — G8419 CALC BMI OUT NRM PARAM NOF/U: HCPCS | Performed by: FAMILY MEDICINE

## 2023-05-11 RX ORDER — DEXTROAMPHETAMINE SACCHARATE, AMPHETAMINE ASPARTATE, DEXTROAMPHETAMINE SULFATE AND AMPHETAMINE SULFATE 2.5; 2.5; 2.5; 2.5 MG/1; MG/1; MG/1; MG/1
10 TABLET ORAL DAILY
Qty: 30 TABLET | Refills: 0 | Status: SHIPPED | OUTPATIENT
Start: 2023-05-11 | End: 2023-06-10

## 2023-05-11 RX ORDER — PROPRANOLOL HYDROCHLORIDE 80 MG/1
80 CAPSULE, EXTENDED RELEASE ORAL DAILY
Qty: 90 CAPSULE | Refills: 1 | Status: SHIPPED | OUTPATIENT
Start: 2023-05-11

## 2023-05-11 SDOH — ECONOMIC STABILITY: INCOME INSECURITY: HOW HARD IS IT FOR YOU TO PAY FOR THE VERY BASICS LIKE FOOD, HOUSING, MEDICAL CARE, AND HEATING?: PATIENT DECLINED

## 2023-05-11 SDOH — ECONOMIC STABILITY: FOOD INSECURITY: WITHIN THE PAST 12 MONTHS, YOU WORRIED THAT YOUR FOOD WOULD RUN OUT BEFORE YOU GOT MONEY TO BUY MORE.: NEVER TRUE

## 2023-05-11 SDOH — ECONOMIC STABILITY: FOOD INSECURITY: WITHIN THE PAST 12 MONTHS, THE FOOD YOU BOUGHT JUST DIDN'T LAST AND YOU DIDN'T HAVE MONEY TO GET MORE.: NEVER TRUE

## 2023-05-11 ASSESSMENT — ENCOUNTER SYMPTOMS
VOMITING: 0
NAUSEA: 0
DIARRHEA: 0
SHORTNESS OF BREATH: 0
CONSTIPATION: 0
COUGH: 0

## 2023-05-11 NOTE — PROGRESS NOTES
Angie Owusu (:  1995) is a 32 y.o. male,Established patient, here for evaluation of the following chief complaint(s):  Medication Check      SUBJECTIVE:  23: Blood pressure is well controlled  Will adjust propranolol again to 80 mg, can stop the 60 mg. Feels like the Vyvanse is working well, but it wears off after about 6 hours. Takes at 630-7 am, and the effects wear off by 1230-1 pm each day. He would like to try a short acting second med to help him the rest of the day. -- Will try adderall 10 mg short acting in the afternoon each day        23: Follow up on propranolol (started at last visit) as well as Vyvanse follow up    Palpitations -- Will change dose to 60 mg due to tinnitus with the 120 mg dose. Still doing ok with the 30 mg of vyvanse, and then other days he feels like it ended too soon. 23: Pt feeling well on adhd meds, and pressure is improved  However he feels like he is still having racing heart rate sensations, and would like to try something to bring his heart rate down to normal.    -- propranolol is reasonable and would not only assist with the headaches that he has but also assist with anxiety and with palpitations which he states occur occasionally. 3.29.23: follow up in office for adhd meds    Adhd -- feels like the 20 mg is working but it does not quite last long enough. Will increase dose to 30 mg daily  -- UDS to be done today    Hypertension - has been on 10 mg, but pressure still systolic >121. Will increase to 20 mg daily    Anxiety -- improved on the 5 mg buspar TID        3.15.23: virtual  Patient had episodes of dizziness while he was working, which coincided with the first few days that he had started his Vyvanse and Buspar. Had previously taken Vyvanse and did not have this side effect, so he believes the Buspar was the cause.   Had felt the buspar working right away on day one, feels like it is working still and also feels like

## 2023-05-16 LAB
6MAM UR QL: NOT DETECTED
7AMINOCLONAZEPAM UR QL: NOT DETECTED
A-OH ALPRAZ UR QL: NOT DETECTED
ALPHA-OH-MIDAZOLAM, URINE: NOT DETECTED
ALPRAZ UR QL: NOT DETECTED
AMPHET UR QL SCN: PRESENT
ANNOTATION COMMENT IMP: NORMAL
ANNOTATION COMMENT IMP: NORMAL
BARBITURATES UR QL: NOT DETECTED
BUPRENORPHINE UR QL: NOT DETECTED
BZE UR QL: NOT DETECTED
CARBOXYTHC UR QL: NOT DETECTED
CARISOPRODOL UR QL: NOT DETECTED
CLONAZEPAM UR QL: NOT DETECTED
CODEINE UR QL: NOT DETECTED
CREAT UR-MCNC: 125.9 MG/DL (ref 20–400)
DIAZEPAM UR QL: NOT DETECTED
ETHYL GLUCURONIDE UR QL: NOT DETECTED
FENTANYL UR QL: NOT DETECTED
GABAPENTIN: NOT DETECTED
HYDROCODONE UR QL: NOT DETECTED
HYDROMORPHONE UR QL: NOT DETECTED
LORAZEPAM UR QL: NOT DETECTED
MDA UR QL: NOT DETECTED
MDEA UR QL: NOT DETECTED
MDMA UR QL: NOT DETECTED
MEPERIDINE UR QL: NOT DETECTED
METHADONE UR QL: NOT DETECTED
METHAMPHET UR QL: NOT DETECTED
MIDAZOLAM UR QL SCN: NOT DETECTED
MORPHINE UR QL: NOT DETECTED
NALOXONE: NOT DETECTED
NORBUPRENORPHINE UR QL CFM: NOT DETECTED
NORDIAZEPAM UR QL: NOT DETECTED
NORFENTANYL UR QL: NOT DETECTED
NORHYDROCODONE UR QL CFM: NOT DETECTED
NOROXYCODONE UR QL CFM: NOT DETECTED
NOROXYMORPHONE, URINE: NOT DETECTED
OXAZEPAM UR QL: NOT DETECTED
OXYCODONE UR QL: NOT DETECTED
OXYMORPHONE UR QL: NOT DETECTED
PATHOLOGY STUDY: NORMAL
PCP UR QL: NOT DETECTED
PHENTERMINE UR QL: NOT DETECTED
PPAA UR QL: NOT DETECTED
PREGABALIN: NOT DETECTED
SERVICE CMNT-IMP: NORMAL
TAPENTADOL UR QL SCN: NOT DETECTED
TAPENTADOL-O-SULFATE, URINE: NOT DETECTED
TEMAZEPAM UR QL: NOT DETECTED
TRAMADOL UR QL: NOT DETECTED
ZOLPIDEM UR QL: NOT DETECTED

## 2023-05-23 DIAGNOSIS — F90.2 ADHD (ATTENTION DEFICIT HYPERACTIVITY DISORDER), COMBINED TYPE: Chronic | ICD-10-CM

## 2023-05-23 DIAGNOSIS — F41.0 ANXIETY ATTACK: Chronic | ICD-10-CM

## 2023-05-23 RX ORDER — BUSPIRONE HYDROCHLORIDE 5 MG/1
TABLET ORAL
Qty: 90 TABLET | Refills: 0 | Status: SHIPPED | OUTPATIENT
Start: 2023-05-23

## 2023-05-23 NOTE — TELEPHONE ENCOUNTER
Patient requesting a medication refill.   Pharmacy: [facility]  Next office visit: Visit date not found  Last regular office visit: 5/11/2023

## 2023-05-25 ENCOUNTER — OFFICE VISIT (OUTPATIENT)
Dept: PRIMARY CARE CLINIC | Age: 28
End: 2023-05-25
Payer: COMMERCIAL

## 2023-05-25 VITALS
TEMPERATURE: 97.3 F | OXYGEN SATURATION: 99 % | DIASTOLIC BLOOD PRESSURE: 87 MMHG | WEIGHT: 170.8 LBS | HEART RATE: 90 BPM | SYSTOLIC BLOOD PRESSURE: 133 MMHG | BODY MASS INDEX: 26.75 KG/M2

## 2023-05-25 DIAGNOSIS — F90.2 ADHD (ATTENTION DEFICIT HYPERACTIVITY DISORDER), COMBINED TYPE: Primary | ICD-10-CM

## 2023-05-25 PROCEDURE — G8419 CALC BMI OUT NRM PARAM NOF/U: HCPCS | Performed by: FAMILY MEDICINE

## 2023-05-25 PROCEDURE — 99214 OFFICE O/P EST MOD 30 MIN: CPT | Performed by: FAMILY MEDICINE

## 2023-05-25 PROCEDURE — 3079F DIAST BP 80-89 MM HG: CPT | Performed by: FAMILY MEDICINE

## 2023-05-25 PROCEDURE — G8427 DOCREV CUR MEDS BY ELIG CLIN: HCPCS | Performed by: FAMILY MEDICINE

## 2023-05-25 PROCEDURE — 3075F SYST BP GE 130 - 139MM HG: CPT | Performed by: FAMILY MEDICINE

## 2023-05-25 PROCEDURE — 1036F TOBACCO NON-USER: CPT | Performed by: FAMILY MEDICINE

## 2023-05-25 ASSESSMENT — ENCOUNTER SYMPTOMS
COUGH: 0
CONSTIPATION: 0
DIARRHEA: 0
NAUSEA: 0
VOMITING: 0
SHORTNESS OF BREATH: 0

## 2023-05-25 NOTE — PROGRESS NOTES
Yuri Jorgensen (:  1995) is a 32 y.o. male,Established patient, here for evaluation of the following chief complaint(s):  Medication Check      SUBJECTIVE:  23:     Does not feel like the 10 mg of adderall seems to be strong enough, also feels like the 30 my vyvanse is not strong enough    Taking propranolol 80 mg in the morning, this is helping a bit more    -- Will increase vyvanse to 40 mg daily; on next visit if this is helping can consider increasing adderall to 20 mg in the afternoons      .23: Blood pressure is well controlled  Will adjust propranolol again to 80 mg, can stop the 60 mg. Feels like the Vyvanse is working well, but it wears off after about 6 hours. Takes at 630-7 am, and the effects wear off by 1230-1 pm each day. He would like to try a short acting second med to help him the rest of the day. -- Will try adderall 10 mg short acting in the afternoon each day        23: Follow up on propranolol (started at last visit) as well as Vyvanse follow up    Palpitations -- Will change dose to 60 mg due to tinnitus with the 120 mg dose. Still doing ok with the 30 mg of vyvanse, and then other days he feels like it ended too soon. .23: Pt feeling well on adhd meds, and pressure is improved  However he feels like he is still having racing heart rate sensations, and would like to try something to bring his heart rate down to normal.    -- propranolol is reasonable and would not only assist with the headaches that he has but also assist with anxiety and with palpitations which he states occur occasionally. 3.29.23: follow up in office for adhd meds    Adhd -- feels like the 20 mg is working but it does not quite last long enough. Will increase dose to 30 mg daily  -- UDS to be done today    Hypertension - has been on 10 mg, but pressure still systolic >859.  Will increase to 20 mg daily    Anxiety -- improved on the 5 mg buspar TID        3.15.23:

## 2023-05-31 RX ORDER — DEXTROAMPHETAMINE SACCHARATE, AMPHETAMINE ASPARTATE, DEXTROAMPHETAMINE SULFATE AND AMPHETAMINE SULFATE 2.5; 2.5; 2.5; 2.5 MG/1; MG/1; MG/1; MG/1
10 TABLET ORAL DAILY
Qty: 30 TABLET | Refills: 0 | OUTPATIENT
Start: 2023-05-31 | End: 2023-06-30

## 2023-06-08 ENCOUNTER — TELEMEDICINE (OUTPATIENT)
Dept: PRIMARY CARE CLINIC | Age: 28
End: 2023-06-08
Payer: COMMERCIAL

## 2023-06-08 DIAGNOSIS — F90.2 ADHD (ATTENTION DEFICIT HYPERACTIVITY DISORDER), COMBINED TYPE: Chronic | ICD-10-CM

## 2023-06-08 PROCEDURE — G8427 DOCREV CUR MEDS BY ELIG CLIN: HCPCS | Performed by: FAMILY MEDICINE

## 2023-06-08 PROCEDURE — 99214 OFFICE O/P EST MOD 30 MIN: CPT | Performed by: FAMILY MEDICINE

## 2023-06-08 RX ORDER — DEXTROAMPHETAMINE SACCHARATE, AMPHETAMINE ASPARTATE, DEXTROAMPHETAMINE SULFATE AND AMPHETAMINE SULFATE 5; 5; 5; 5 MG/1; MG/1; MG/1; MG/1
20 TABLET ORAL DAILY
Qty: 16 TABLET | Refills: 0 | Status: SHIPPED | OUTPATIENT
Start: 2023-06-08 | End: 2023-06-24

## 2023-06-08 ASSESSMENT — ENCOUNTER SYMPTOMS
COUGH: 0
NAUSEA: 0
VOMITING: 0
DIARRHEA: 0
CONSTIPATION: 0
SHORTNESS OF BREATH: 0

## 2023-06-08 NOTE — PROGRESS NOTES
Gomez Reynolds (:  1995) is a Established patient, presenting virtually for evaluation of the following:    Assessment & Plan   Below is the assessment and plan developed based on review of pertinent history, physical exam, labs, studies, and medications. 1. ADHD (attention deficit hyperactivity disorder), combined type  Comments:  continue vyvanse 40 mg daily; add on adderall 20 mg in the afternoons. Follow in 3 weeks  Orders:  -     amphetamine-dextroamphetamine (ADDERALL, 20MG,) 20 MG tablet; Take 1 tablet by mouth daily for 16 days. Max Daily Amount: 20 mg, Disp-16 tablet, R-0Normal    Return in about 15 days (around 2023) for follow up ADHD vyvanse, adderall. Subjective   6.8.23: Follow on vyvanse 40 mg  Consider incraseding adderall to 20 in pm      5.23:     Does not feel like the 10 mg of adderall seems to be strong enough, also feels like the 30 my vyvanse is not strong enough    Taking propranolol 80 mg in the morning, this is helping a bit more    -- Will increase vyvanse to 40 mg daily; on next visit if this is helping can consider increasing adderall to 20 mg in the afternoons      5.23: Blood pressure is well controlled  Will adjust propranolol again to 80 mg, can stop the 60 mg. Feels like the Vyvanse is working well, but it wears off after about 6 hours. Takes at 630-7 am, and the effects wear off by 1230-1 pm each day. He would like to try a short acting second med to help him the rest of the day. -- Will try adderall 10 mg short acting in the afternoon each day        .23: Follow up on propranolol (started at last visit) as well as Vyvanse follow up    Palpitations -- Will change dose to 60 mg due to tinnitus with the 120 mg dose. Still doing ok with the 30 mg of vyvanse, and then other days he feels like it ended too soon. ..23:   Pt feeling well on adhd meds, and pressure is improved  However he feels like he is still having racing heart rate

## 2023-06-19 DIAGNOSIS — F41.0 ANXIETY ATTACK: Chronic | ICD-10-CM

## 2023-06-19 DIAGNOSIS — F90.2 ADHD (ATTENTION DEFICIT HYPERACTIVITY DISORDER), COMBINED TYPE: Chronic | ICD-10-CM

## 2023-06-19 RX ORDER — DEXTROAMPHETAMINE SACCHARATE, AMPHETAMINE ASPARTATE, DEXTROAMPHETAMINE SULFATE AND AMPHETAMINE SULFATE 5; 5; 5; 5 MG/1; MG/1; MG/1; MG/1
20 TABLET ORAL DAILY
Qty: 16 TABLET | Refills: 0 | Status: CANCELLED | OUTPATIENT
Start: 2023-06-19 | End: 2023-07-05

## 2023-06-19 NOTE — TELEPHONE ENCOUNTER
PLEASE ADVISE- SEE LAST OV NOTE BELOW:    1. ADHD (attention deficit hyperactivity disorder), combined type  Comments:  continue vyvanse 40 mg daily; add on adderall 20 mg in the afternoons. Follow in 3 weeks  Orders:  -     amphetamine-dextroamphetamine (ADDERALL, 20MG,) 20 MG tablet; Take 1 tablet by mouth daily for 16 days. Max Daily Amount: 20 mg, Disp-16 tablet, R-0Normal     Return in about 15 days (around 6/23/2023) for follow up ADHD vyvanse, adderall. Subjective   6.8.23:     Follow on vyvanse 40 mg  Consider incraseding adderall to 20 in pm

## 2023-06-21 ENCOUNTER — TELEMEDICINE (OUTPATIENT)
Dept: PRIMARY CARE CLINIC | Age: 28
End: 2023-06-21
Payer: COMMERCIAL

## 2023-06-21 DIAGNOSIS — I10 ESSENTIAL HYPERTENSION: ICD-10-CM

## 2023-06-21 DIAGNOSIS — F90.2 ADHD (ATTENTION DEFICIT HYPERACTIVITY DISORDER), COMBINED TYPE: Primary | Chronic | ICD-10-CM

## 2023-06-21 DIAGNOSIS — R00.2 INTERMITTENT PALPITATIONS: ICD-10-CM

## 2023-06-21 DIAGNOSIS — F41.0 ANXIETY ATTACK: Chronic | ICD-10-CM

## 2023-06-21 PROCEDURE — 99214 OFFICE O/P EST MOD 30 MIN: CPT | Performed by: FAMILY MEDICINE

## 2023-06-21 PROCEDURE — G8427 DOCREV CUR MEDS BY ELIG CLIN: HCPCS | Performed by: FAMILY MEDICINE

## 2023-06-21 RX ORDER — LISINOPRIL 20 MG/1
TABLET ORAL
Qty: 90 TABLET | Refills: 1 | Status: SHIPPED | OUTPATIENT
Start: 2023-06-21

## 2023-06-21 RX ORDER — DEXTROAMPHETAMINE SACCHARATE, AMPHETAMINE ASPARTATE, DEXTROAMPHETAMINE SULFATE AND AMPHETAMINE SULFATE 5; 5; 5; 5 MG/1; MG/1; MG/1; MG/1
20 TABLET ORAL 2 TIMES DAILY
Qty: 60 TABLET | Refills: 0 | Status: SHIPPED | OUTPATIENT
Start: 2023-06-21 | End: 2023-07-21

## 2023-06-21 RX ORDER — BUSPIRONE HYDROCHLORIDE 5 MG/1
5 TABLET ORAL 3 TIMES DAILY
Qty: 90 TABLET | Refills: 3 | Status: SHIPPED | OUTPATIENT
Start: 2023-06-21

## 2023-06-21 ASSESSMENT — ENCOUNTER SYMPTOMS
CONSTIPATION: 0
DIARRHEA: 1
COUGH: 0
VOMITING: 0
SHORTNESS OF BREATH: 0
NAUSEA: 0

## 2023-06-21 NOTE — PROGRESS NOTES
Negative for constipation, nausea and vomiting. Endocrine: Negative for polyuria. Genitourinary:  Negative for frequency. Skin:  Negative for rash. Objective   Patient-Reported Vitals  No data recorded     Physical Exam  [INSTRUCTIONS:  \"[x]\" Indicates a positive item  \"[]\" Indicates a negative item  -- DELETE ALL ITEMS NOT EXAMINED]    Constitutional: [x] Appears well-developed and well-nourished [x] No apparent distress      [] Abnormal -     Mental status: [x] Alert and awake  [x] Oriented to person/place/time [x] Able to follow commands    [] Abnormal -     Eyes:   EOM    [x]  Normal    [] Abnormal -   Sclera  [x]  Normal    [] Abnormal -          Discharge [x]  None visible   [] Abnormal -     HENT: [x] Normocephalic, atraumatic  [] Abnormal -   [x] Mouth/Throat: Mucous membranes are moist    External Ears [x] Normal  [] Abnormal -    Neck: [x] No visualized mass [] Abnormal -     Pulmonary/Chest: [x] Respiratory effort normal   [x] No visualized signs of difficulty breathing or respiratory distress        [] Abnormal -      Musculoskeletal:   [x] Normal gait with no signs of ataxia         [x] Normal range of motion of neck        [] Abnormal -     Neurological:        [x] No Facial Asymmetry (Cranial nerve 7 motor function) (limited exam due to video visit)          [x] No gaze palsy        [] Abnormal -          Skin:        [x] No significant exanthematous lesions or discoloration noted on facial skin         [] Abnormal -            Psychiatric:       [x] Normal Affect [] Abnormal -        [x] No Hallucinations    Other pertinent observable physical exam findings:-             Gabrielle Casas, was evaluated through a synchronous (real-time) audio-video encounter. The patient (or guardian if applicable) is aware that this is a billable service, which includes applicable co-pays. This Virtual Visit was conducted with patient's (and/or legal guardian's) consent.  Patient identification was verified,

## 2023-07-19 ENCOUNTER — TELEMEDICINE (OUTPATIENT)
Dept: PRIMARY CARE CLINIC | Age: 28
End: 2023-07-19
Payer: COMMERCIAL

## 2023-07-19 DIAGNOSIS — F90.2 ADHD (ATTENTION DEFICIT HYPERACTIVITY DISORDER), COMBINED TYPE: Primary | Chronic | ICD-10-CM

## 2023-07-19 PROCEDURE — G8427 DOCREV CUR MEDS BY ELIG CLIN: HCPCS | Performed by: FAMILY MEDICINE

## 2023-07-19 PROCEDURE — 99214 OFFICE O/P EST MOD 30 MIN: CPT | Performed by: FAMILY MEDICINE

## 2023-07-19 RX ORDER — DEXTROAMPHETAMINE SACCHARATE, AMPHETAMINE ASPARTATE MONOHYDRATE, DEXTROAMPHETAMINE SULFATE AND AMPHETAMINE SULFATE 5; 5; 5; 5 MG/1; MG/1; MG/1; MG/1
20 CAPSULE, EXTENDED RELEASE ORAL EVERY MORNING
Qty: 21 CAPSULE | Refills: 0 | Status: SHIPPED | OUTPATIENT
Start: 2023-07-19 | End: 2023-08-09

## 2023-07-19 RX ORDER — DEXTROAMPHETAMINE SACCHARATE, AMPHETAMINE ASPARTATE, DEXTROAMPHETAMINE SULFATE AND AMPHETAMINE SULFATE 5; 5; 5; 5 MG/1; MG/1; MG/1; MG/1
20 TABLET ORAL DAILY
Qty: 21 TABLET | Refills: 0 | Status: SHIPPED | OUTPATIENT
Start: 2023-07-19 | End: 2023-08-09

## 2023-07-19 ASSESSMENT — ENCOUNTER SYMPTOMS
VOMITING: 0
NAUSEA: 0
CONSTIPATION: 0
COUGH: 0
SHORTNESS OF BREATH: 0

## 2023-07-19 NOTE — PROGRESS NOTES
Manoj David (:  1995) is a Established patient, presenting virtually for evaluation of the following:    Assessment & Plan   Below is the assessment and plan developed based on review of pertinent history, physical exam, labs, studies, and medications. 1. ADHD (attention deficit hyperactivity disorder), combined type  Comments:  start adderall xr 20 mg daily + adderall 20 mg short acting daily. RTO 3 weeks  Orders:  -     amphetamine-dextroamphetamine (ADDERALL XR) 20 MG extended release capsule; Take 1 capsule by mouth every morning for 21 days. Max Daily Amount: 20 mg, Disp-21 capsule, R-0Print  -     amphetamine-dextroamphetamine (ADDERALL, 20MG,) 20 MG tablet; Take 1 tablet by mouth daily for 21 days. Max Daily Amount: 20 mg, Disp-21 tablet, R-0Print    Return in about 3 weeks (around 2023) for med follow up. Subjective   23 - virtual visit  Follow up on starting adderall 20 mg BID. Feels like the 20 mg short acting is wearing off within 4 hours, and this is not giving him enough time to be productive during the daytime. Did try taking 1/2 tab more of his current pills (admittedly) and this was beneficial but still did not help with longevity of productiveness    -- will start adderall xr 20 mg, and also daily adderall short acting 20 mg.    - RTO 3 weeks. Printed rx      23 -- virtual visit    Started the adderall 20 mg in the pm.  Is feeling like this actually helps his symptoms more than the vyvanse 40 mg.   -- will d.c vyvanse, start adderall 20 mg BID x 30 days. -- vyvanse also caused diarrheal side effect, would like to switch off this medicine as it is a known side effect    Hypertension -- well controlled on current lisinopril, propranolol    Palpitations -- states family hx of iron defic, was concerned that this could be exacerbating palpitations if it is causation. Will check iron levels and cbc.         23:     Follow on vyvanse 40 mg  Consider incraseding

## 2023-08-07 ENCOUNTER — TELEMEDICINE (OUTPATIENT)
Dept: PRIMARY CARE CLINIC | Age: 28
End: 2023-08-07
Payer: COMMERCIAL

## 2023-08-07 DIAGNOSIS — F90.2 ADHD (ATTENTION DEFICIT HYPERACTIVITY DISORDER), COMBINED TYPE: Primary | Chronic | ICD-10-CM

## 2023-08-07 PROCEDURE — G8427 DOCREV CUR MEDS BY ELIG CLIN: HCPCS | Performed by: FAMILY MEDICINE

## 2023-08-07 PROCEDURE — 99214 OFFICE O/P EST MOD 30 MIN: CPT | Performed by: FAMILY MEDICINE

## 2023-08-07 RX ORDER — DEXTROAMPHETAMINE SACCHARATE, AMPHETAMINE ASPARTATE MONOHYDRATE, DEXTROAMPHETAMINE SULFATE AND AMPHETAMINE SULFATE 7.5; 7.5; 7.5; 7.5 MG/1; MG/1; MG/1; MG/1
30 CAPSULE, EXTENDED RELEASE ORAL DAILY
Qty: 30 CAPSULE | Refills: 0 | Status: SHIPPED | OUTPATIENT
Start: 2023-08-08 | End: 2023-09-07

## 2023-08-07 RX ORDER — OMEPRAZOLE 20 MG/1
CAPSULE, DELAYED RELEASE ORAL
COMMUNITY

## 2023-08-07 RX ORDER — DEXTROAMPHETAMINE SACCHARATE, AMPHETAMINE ASPARTATE, DEXTROAMPHETAMINE SULFATE AND AMPHETAMINE SULFATE 5; 5; 5; 5 MG/1; MG/1; MG/1; MG/1
20 TABLET ORAL DAILY
Qty: 27 TABLET | Refills: 0 | Status: SHIPPED | OUTPATIENT
Start: 2023-08-11 | End: 2023-09-07

## 2023-08-07 ASSESSMENT — ENCOUNTER SYMPTOMS
CONSTIPATION: 0
NAUSEA: 0
SHORTNESS OF BREATH: 0
COUGH: 0
VOMITING: 0

## 2023-08-07 NOTE — PROGRESS NOTES
increase dose to 30 mg daily  -- UDS to be done today    Hypertension - has been on 10 mg, but pressure still systolic >890. Will increase to 20 mg daily    Anxiety -- improved on the 5 mg buspar TID        3.15.23: virtual  Patient had episodes of dizziness while he was working, which coincided with the first few days that he had started his Vyvanse and Buspar. Had previously taken Vyvanse and did not have this side effect, so he believes the Buspar was the cause. Had felt the buspar working right away on day one, feels like it is working still and also feels like the dizziness episodes are now improving. 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 His first day off work was 3.14.23, and he may return to work on 3.21.23 without any reservations. 3.8.23: new to this physician    Having issues with anxiety. Previously was on Vyvanse 20-40 mg (he is unsure which) and was taking twice per day. But he had to stop that medication whenhe switched insurances. He states that whenhe was on that medicine it felt like a fog was lifted and he had much less anxiety than he does now. Went back to smoking/vaping, after not being on it for a while. D/c'ed caffeine first, and then now working on once again quitting vaping    Recently he went from working from home to working as an [employer] . Working 9 hours per day, 4 days per week. He walks a lot and it is very stressful for him. Family hx: Mother taking blood pressure med and bipolar medicine, father takes cholesterol lowering medicine    Patient was told he could be bipolar and tried a bipolar medication, tried prozac, tried zyprexa -- either felt like a zombie or like he could not sleep. Was found to not be bipolar, but to have adhd and anxiety with panic attacks.      Plan for today:  Start vyvanse again at 20 mg daily  Start buspar 5 mg TID PRN  Follow in 3 weeks

## 2023-08-16 RX ORDER — BUSPIRONE HYDROCHLORIDE 5 MG/1
TABLET ORAL
Qty: 90 TABLET | Refills: 0 | OUTPATIENT
Start: 2023-08-16

## 2023-09-04 DIAGNOSIS — F90.2 ADHD (ATTENTION DEFICIT HYPERACTIVITY DISORDER), COMBINED TYPE: Chronic | ICD-10-CM

## 2023-09-04 RX ORDER — DEXTROAMPHETAMINE SACCHARATE, AMPHETAMINE ASPARTATE MONOHYDRATE, DEXTROAMPHETAMINE SULFATE AND AMPHETAMINE SULFATE 7.5; 7.5; 7.5; 7.5 MG/1; MG/1; MG/1; MG/1
30 CAPSULE, EXTENDED RELEASE ORAL DAILY
Qty: 30 CAPSULE | Refills: 0 | Status: CANCELLED | OUTPATIENT
Start: 2023-09-04 | End: 2023-10-04

## 2023-09-06 DIAGNOSIS — F41.0 ANXIETY ATTACK: Chronic | ICD-10-CM

## 2023-09-06 RX ORDER — BUSPIRONE HYDROCHLORIDE 5 MG/1
TABLET ORAL
Qty: 270 TABLET | Refills: 0 | Status: SHIPPED | OUTPATIENT
Start: 2023-09-06

## 2023-09-06 NOTE — TELEPHONE ENCOUNTER
Medication:   Requested Prescriptions     Pending Prescriptions Disp Refills    busPIRone (BUSPAR) 5 MG tablet [Pharmacy Med Name: busPIRone HCL 5 MG TABLET] 270 tablet      Sig: TAKE ONE TABLET BY MOUTH THREE TIMES A DAY        Last Filled:      Patient Phone Number: [phone] (home)     Last appt: 8/7/2023   Next appt: 9/7/2023    Last OARRS:   RX Monitoring 8/7/2023   Attestation -   Periodic Controlled Substance Monitoring Possible medication side effects, risk of tolerance/dependence & alternative treatments discussed. ;No signs of potential drug abuse or diversion identified.

## 2023-09-07 ENCOUNTER — TELEMEDICINE (OUTPATIENT)
Dept: PRIMARY CARE CLINIC | Age: 28
End: 2023-09-07
Payer: COMMERCIAL

## 2023-09-07 DIAGNOSIS — F90.2 ADHD (ATTENTION DEFICIT HYPERACTIVITY DISORDER), COMBINED TYPE: Primary | Chronic | ICD-10-CM

## 2023-09-07 DIAGNOSIS — R00.2 INTERMITTENT PALPITATIONS: Chronic | ICD-10-CM

## 2023-09-07 PROCEDURE — G8427 DOCREV CUR MEDS BY ELIG CLIN: HCPCS | Performed by: FAMILY MEDICINE

## 2023-09-07 PROCEDURE — 99214 OFFICE O/P EST MOD 30 MIN: CPT | Performed by: FAMILY MEDICINE

## 2023-09-07 RX ORDER — PROPRANOLOL HCL 60 MG
60 CAPSULE, EXTENDED RELEASE 24HR ORAL DAILY
Qty: 90 CAPSULE | Refills: 0 | Status: SHIPPED | OUTPATIENT
Start: 2023-09-07

## 2023-09-07 RX ORDER — DEXTROAMPHETAMINE SACCHARATE, AMPHETAMINE ASPARTATE, DEXTROAMPHETAMINE SULFATE AND AMPHETAMINE SULFATE 5; 5; 5; 5 MG/1; MG/1; MG/1; MG/1
20 TABLET ORAL DAILY
Qty: 30 TABLET | Refills: 0 | Status: SHIPPED | OUTPATIENT
Start: 2023-10-08 | End: 2023-11-07

## 2023-09-07 RX ORDER — DEXTROAMPHETAMINE SACCHARATE, AMPHETAMINE ASPARTATE, DEXTROAMPHETAMINE SULFATE AND AMPHETAMINE SULFATE 5; 5; 5; 5 MG/1; MG/1; MG/1; MG/1
20 TABLET ORAL DAILY
Qty: 30 TABLET | Refills: 0 | Status: SHIPPED | OUTPATIENT
Start: 2023-09-08 | End: 2023-10-08

## 2023-09-07 RX ORDER — DEXTROAMPHETAMINE SACCHARATE, AMPHETAMINE ASPARTATE MONOHYDRATE, DEXTROAMPHETAMINE SULFATE AND AMPHETAMINE SULFATE 7.5; 7.5; 7.5; 7.5 MG/1; MG/1; MG/1; MG/1
30 CAPSULE, EXTENDED RELEASE ORAL DAILY
Qty: 30 CAPSULE | Refills: 0 | Status: SHIPPED | OUTPATIENT
Start: 2023-09-08 | End: 2023-10-08

## 2023-09-07 RX ORDER — DEXTROAMPHETAMINE SACCHARATE, AMPHETAMINE ASPARTATE MONOHYDRATE, DEXTROAMPHETAMINE SULFATE AND AMPHETAMINE SULFATE 7.5; 7.5; 7.5; 7.5 MG/1; MG/1; MG/1; MG/1
30 CAPSULE, EXTENDED RELEASE ORAL DAILY
Qty: 30 CAPSULE | Refills: 0 | Status: SHIPPED | OUTPATIENT
Start: 2023-10-08 | End: 2023-11-07

## 2023-09-07 ASSESSMENT — ENCOUNTER SYMPTOMS
CONSTIPATION: 0
NAUSEA: 0
SHORTNESS OF BREATH: 0
VOMITING: 0
COUGH: 0

## 2023-09-07 NOTE — PROGRESS NOTES
Madison Celis (:  1995) is a Established patient, presenting virtually for evaluation of the following:    Assessment & Plan   Below is the assessment and plan developed based on review of pertinent history, physical exam, labs, studies, and medications. 1. ADHD (attention deficit hyperactivity disorder), combined type  Comments:  continue 30 mg XR adderall, continue short acting 20 mg adderall dosing  Orders:  -     amphetamine-dextroamphetamine (ADDERALL XR) 30 MG extended release capsule; Take 1 capsule by mouth daily for 30 days. Max Daily Amount: 30 mg, Disp-30 capsule, R-0Normal  -     amphetamine-dextroamphetamine (ADDERALL XR) 30 MG extended release capsule; Take 1 capsule by mouth daily for 30 days. Max Daily Amount: 30 mg, Disp-30 capsule, R-0Normal  -     amphetamine-dextroamphetamine (ADDERALL, 20MG,) 20 MG tablet; Take 1 tablet by mouth daily for 30 days. Max Daily Amount: 20 mg, Disp-30 tablet, R-0Normal  -     amphetamine-dextroamphetamine (ADDERALL, 20MG,) 20 MG tablet; Take 1 tablet by mouth daily for 30 days. Max Daily Amount: 20 mg, Disp-30 tablet, R-0Normal  2. Intermittent palpitations  Comments:  adjust propranolol dose to 80 mg daily, d/c 60 mg dose  Orders:  -     propranolol (INDERAL LA) 60 MG extended release capsule; Take 1 capsule by mouth daily, Disp-90 capsule, R-0Normal    Return in about 2 months (around 2023). Subjective   9.7.23: Follow after increasing dose to adderall 30 xr + adderall 20 mg short acting  -- doing well on current dosing    Does feel like he wants to drop down the propranolol dose to 60 mg, as he is more physically active and wants to naturally lower his heart rate. This is reasonable  - will send in new rx for propranolol 60 mg daily.      Follow in 2 mo      8.7.23 - virtual visit  Follow after start of adderall 20 mg xr + adderall 20 mg short acting    Short acting working well, but did not feel the 20 mg XR was very effective as it made him

## 2023-09-11 RX ORDER — DEXTROAMPHETAMINE SACCHARATE, AMPHETAMINE ASPARTATE, DEXTROAMPHETAMINE SULFATE AND AMPHETAMINE SULFATE 5; 5; 5; 5 MG/1; MG/1; MG/1; MG/1
20 TABLET ORAL DAILY
Qty: 27 TABLET | Refills: 0 | OUTPATIENT
Start: 2023-09-11 | End: 2023-10-08

## 2023-11-05 DIAGNOSIS — R00.2 INTERMITTENT PALPITATIONS: Chronic | ICD-10-CM

## 2023-11-06 RX ORDER — PROPRANOLOL HYDROCHLORIDE 80 MG/1
80 CAPSULE, EXTENDED RELEASE ORAL DAILY
Qty: 90 CAPSULE | Refills: 1 | Status: SHIPPED | OUTPATIENT
Start: 2023-11-06

## 2023-11-06 NOTE — TELEPHONE ENCOUNTER
Per VV notes 9/7/23:      2. Intermittent palpitations  Comments:  adjust propranolol dose to 80 mg daily, d/c 60 mg dose  Orders:  -     propranolol (INDERAL LA) 60 MG extended release capsule; Take 1 capsule by mouth daily, Disp-90 capsule, R-0Normal            We have listed Propanolol 60 mg. Pharmacy requesting 80 mg.

## 2023-11-08 DIAGNOSIS — F90.2 ADHD (ATTENTION DEFICIT HYPERACTIVITY DISORDER), COMBINED TYPE: Chronic | ICD-10-CM

## 2023-11-08 NOTE — TELEPHONE ENCOUNTER
Medication:   Requested Prescriptions     Pending Prescriptions Disp Refills    amphetamine-dextroamphetamine (ADDERALL XR) 30 MG extended release capsule 30 capsule 0     Sig: Take 1 capsule by mouth daily for 30 days. Max Daily Amount: 30 mg    amphetamine-dextroamphetamine (ADDERALL, 20MG,) 20 MG tablet 30 tablet 0     Sig: Take 1 tablet by mouth daily for 30 days. Max Daily Amount: 20 mg        Last Filled:      Patient Phone Number: [phone] (home)     Last appt: 9/7/2023   Next appt: Visit date not found    Last OARRS:       8/7/2023     8:46 AM   RX Monitoring   Periodic Controlled Substance Monitoring Possible medication side effects, risk of tolerance/dependence & alternative treatments discussed. ;No signs of potential drug abuse or diversion identified.

## 2023-11-09 ENCOUNTER — HOSPITAL ENCOUNTER (EMERGENCY)
Age: 28
Discharge: HOME | End: 2023-11-09
Payer: COMMERCIAL

## 2023-11-09 VITALS
BODY MASS INDEX: 27.65 KG/M2 | HEIGHT: 67 IN | TEMPERATURE: 97.9 F | DIASTOLIC BLOOD PRESSURE: 82 MMHG | WEIGHT: 176.15 LBS | HEART RATE: 70 BPM | SYSTOLIC BLOOD PRESSURE: 125 MMHG | OXYGEN SATURATION: 100 % | RESPIRATION RATE: 16 BRPM

## 2023-11-09 DIAGNOSIS — K02.9 DENTAL CARIES: Primary | ICD-10-CM

## 2023-11-09 DIAGNOSIS — K08.89 PAIN, DENTAL: ICD-10-CM

## 2023-11-09 PROCEDURE — 2500000003 HC RX 250 WO HCPCS: Performed by: NURSE PRACTITIONER

## 2023-11-09 PROCEDURE — 99283 EMERGENCY DEPT VISIT LOW MDM: CPT

## 2023-11-09 PROCEDURE — 6370000000 HC RX 637 (ALT 250 FOR IP): Performed by: NURSE PRACTITIONER

## 2023-11-09 RX ORDER — PENICILLIN V POTASSIUM 500 MG/1
500 TABLET ORAL 4 TIMES DAILY
Qty: 28 TABLET | Refills: 0 | Status: SHIPPED | OUTPATIENT
Start: 2023-11-09 | End: 2023-11-16

## 2023-11-09 RX ORDER — IBUPROFEN 400 MG/1
400 TABLET ORAL 3 TIMES DAILY PRN
Qty: 15 TABLET | Refills: 0 | Status: SHIPPED | OUTPATIENT
Start: 2023-11-09 | End: 2023-11-14

## 2023-11-09 RX ORDER — PENICILLIN V POTASSIUM 250 MG/1
500 TABLET ORAL ONCE
Status: COMPLETED | OUTPATIENT
Start: 2023-11-09 | End: 2023-11-09

## 2023-11-09 RX ORDER — DEXTROAMPHETAMINE SACCHARATE, AMPHETAMINE ASPARTATE MONOHYDRATE, DEXTROAMPHETAMINE SULFATE AND AMPHETAMINE SULFATE 7.5; 7.5; 7.5; 7.5 MG/1; MG/1; MG/1; MG/1
30 CAPSULE, EXTENDED RELEASE ORAL DAILY
Qty: 30 CAPSULE | Refills: 0 | Status: SHIPPED | OUTPATIENT
Start: 2023-11-09 | End: 2023-12-09

## 2023-11-09 RX ORDER — ACETAMINOPHEN 500 MG
500 TABLET ORAL 4 TIMES DAILY PRN
Qty: 28 TABLET | Refills: 0 | Status: SHIPPED | OUTPATIENT
Start: 2023-11-09 | End: 2023-11-16

## 2023-11-09 RX ORDER — DEXTROAMPHETAMINE SACCHARATE, AMPHETAMINE ASPARTATE, DEXTROAMPHETAMINE SULFATE AND AMPHETAMINE SULFATE 5; 5; 5; 5 MG/1; MG/1; MG/1; MG/1
20 TABLET ORAL DAILY
Qty: 30 TABLET | Refills: 0 | Status: SHIPPED | OUTPATIENT
Start: 2023-11-09 | End: 2023-12-09

## 2023-11-09 RX ORDER — ACETAMINOPHEN 325 MG/1
650 TABLET ORAL ONCE
Status: COMPLETED | OUTPATIENT
Start: 2023-11-09 | End: 2023-11-09

## 2023-11-09 RX ORDER — BUPIVACAINE HYDROCHLORIDE AND EPINEPHRINE 5; 5 MG/ML; UG/ML
1 INJECTION, SOLUTION EPIDURAL; INTRACAUDAL; PERINEURAL ONCE
Status: COMPLETED | OUTPATIENT
Start: 2023-11-09 | End: 2023-11-09

## 2023-11-09 RX ADMIN — BUPIVACAINE HYDROCHLORIDE AND EPINEPHRINE BITARTRATE 1 ML: 5; .005 INJECTION, SOLUTION EPIDURAL; INTRACAUDAL; PERINEURAL at 14:43

## 2023-11-09 RX ADMIN — LIDOCAINE HYDROCHLORIDE 5 ML: 10 INJECTION, SOLUTION EPIDURAL; INFILTRATION; INTRACAUDAL; PERINEURAL at 14:43

## 2023-11-09 RX ADMIN — ACETAMINOPHEN 650 MG: 325 TABLET ORAL at 14:19

## 2023-11-09 RX ADMIN — PENICILLIN V POTASSIUM 500 MG: 250 TABLET, FILM COATED ORAL at 14:18

## 2023-11-09 ASSESSMENT — PAIN DESCRIPTION - ORIENTATION: ORIENTATION: LEFT;LOWER

## 2023-11-09 ASSESSMENT — PAIN DESCRIPTION - DESCRIPTORS: DESCRIPTORS: DULL

## 2023-11-09 ASSESSMENT — PAIN - FUNCTIONAL ASSESSMENT
PAIN_FUNCTIONAL_ASSESSMENT: NONE - DENIES PAIN
PAIN_FUNCTIONAL_ASSESSMENT: 0-10

## 2023-11-09 ASSESSMENT — PAIN DESCRIPTION - LOCATION: LOCATION: TEETH

## 2023-11-09 ASSESSMENT — PAIN SCALES - GENERAL
PAINLEVEL_OUTOF10: 0
PAINLEVEL_OUTOF10: 8

## 2023-11-09 ASSESSMENT — PAIN DESCRIPTION - FREQUENCY: FREQUENCY: CONTINUOUS

## 2023-11-09 ASSESSMENT — PAIN DESCRIPTION - PAIN TYPE: TYPE: ACUTE PAIN

## 2023-11-09 NOTE — ED PROVIDER NOTES
[address] [facility] [address] [address]        Pt Name: Enrique Payne  MRN: [id number]  [address] [facility] 1995  Date of evaluation: 11/9/2023  Provider: HAMLET Mosher - CNP  PCP: Carin Croft DO  Note Started: 2:32 PM EST 11/9/23      {Shared Not Shared:73570}      CHIEF COMPLAINT       Chief Complaint   Patient presents with    Dental Pain     Pt with bottom left tooth pain x2 months. Pain has increased over the last few days. Unable to get into dentist for a few months. HISTORY OF PRESENT ILLNESS: 1 or more Elements     {History from (Optional):92627}    {Limitations to history (Optional):14055}    Enrique Payne is a 25 y.o. male who presents ***    Nursing Notes were all reviewed and agreed with or any disagreements were addressed in the HPI. REVIEW OF SYSTEMS :      Review of Systems    Positives and Pertinent negatives as per HPI. SURGICAL HISTORY     Past Surgical History:   Procedure Laterality Date    Vincent Wang Left     Dr. Leo Braxton. Medial meniscus tear    WISDOM TOOTH EXTRACTION         CURRENTMEDICATIONS       Previous Medications    AMPHETAMINE-DEXTROAMPHETAMINE (ADDERALL XR) 30 MG EXTENDED RELEASE CAPSULE    Take 1 capsule by mouth daily for 30 days. Max Daily Amount: 30 mg    AMPHETAMINE-DEXTROAMPHETAMINE (ADDERALL XR) 30 MG EXTENDED RELEASE CAPSULE    Take 1 capsule by mouth daily for 30 days. Max Daily Amount: 30 mg    AMPHETAMINE-DEXTROAMPHETAMINE (ADDERALL, 20MG,) 20 MG TABLET    Take 1 tablet by mouth daily for 30 days. Max Daily Amount: 20 mg    AMPHETAMINE-DEXTROAMPHETAMINE (ADDERALL, 20MG,) 20 MG TABLET    Take 1 tablet by mouth daily for 30 days.  Max Daily Amount: 20 mg    BUSPIRONE (BUSPAR) 5 MG TABLET    TAKE ONE TABLET BY MOUTH THREE TIMES A DAY    LISINOPRIL (PRINIVIL;ZESTRIL) 20 MG TABLET    TAKE ONE TABLET BY MOUTH DAILY    PROPRANOLOL (INDERAL LA) 60 MG EXTENDED RELEASE CAPSULE    Take 1 capsule by

## 2023-11-09 NOTE — ED TRIAGE NOTES
Pt with bottom left tooth pain x2 months. Pain has increased over the last few days. Unable to get into dentist for a few months.

## 2023-11-09 NOTE — ED NOTES
Discharge and education instructions reviewed. Patient verbalized understanding, teach-back successful. Patient denied questions at this time. No acute distress noted. Patient instructed to follow-up as noted - return to emergency department if symptoms worsen. Patient verbalized understanding. Discharged per EDMD with discharge instructions.        Norbert Mcnulty RN  11/09/23 [id number]

## 2023-11-16 ASSESSMENT — ENCOUNTER SYMPTOMS
VOMITING: 0
EYE PAIN: 0
ABDOMINAL PAIN: 0
VOICE CHANGE: 0
SORE THROAT: 0
TROUBLE SWALLOWING: 0
FACIAL SWELLING: 0
SHORTNESS OF BREATH: 0
BACK PAIN: 0
COUGH: 0
NAUSEA: 0
ANAL BLEEDING: 0

## 2023-12-03 DIAGNOSIS — F41.0 ANXIETY ATTACK: Chronic | ICD-10-CM

## 2023-12-04 RX ORDER — BUSPIRONE HYDROCHLORIDE 5 MG/1
TABLET ORAL
Qty: 270 TABLET | Refills: 0 | Status: SHIPPED | OUTPATIENT
Start: 2023-12-04

## 2023-12-04 NOTE — TELEPHONE ENCOUNTER
Medication:   Requested Prescriptions     Pending Prescriptions Disp Refills    busPIRone (BUSPAR) 5 MG tablet [Pharmacy Med Name: busPIRone HCL 5 MG TABLET] 270 tablet 0     Sig: TAKE ONE TABLET BY MOUTH THREE TIMES A DAY        Last Filled:      Patient Phone Number: [phone] (home)     Last appt: 9/7/2023   Next appt: Visit date not found    Last OARRS:       8/7/2023     8:46 AM   RX Monitoring   Periodic Controlled Substance Monitoring Possible medication side effects, risk of tolerance/dependence & alternative treatments discussed. ;No signs of potential drug abuse or diversion identified.

## 2023-12-05 DIAGNOSIS — F90.2 ADHD (ATTENTION DEFICIT HYPERACTIVITY DISORDER), COMBINED TYPE: Chronic | ICD-10-CM

## 2023-12-05 NOTE — TELEPHONE ENCOUNTER
Medication:   Requested Prescriptions     Pending Prescriptions Disp Refills    amphetamine-dextroamphetamine (ADDERALL, 20MG,) 20 MG tablet 30 tablet 0     Sig: Take 1 tablet by mouth daily for 30 days. Max Daily Amount: 20 mg     Last Filled:  #30 on 11/09/23    Last appt: 9/7/2023   Next appt: 12/11/2023    Last OARRS:       8/7/2023     8:46 AM   RX Monitoring   Periodic Controlled Substance Monitoring Possible medication side effects, risk of tolerance/dependence & alternative treatments discussed.;No signs of potential drug abuse or diversion identified.

## 2023-12-11 ENCOUNTER — TELEMEDICINE (OUTPATIENT)
Dept: PRIMARY CARE CLINIC | Age: 28
End: 2023-12-11
Payer: COMMERCIAL

## 2023-12-11 ENCOUNTER — TELEPHONE (OUTPATIENT)
Dept: PRIMARY CARE CLINIC | Age: 28
End: 2023-12-11

## 2023-12-11 DIAGNOSIS — F90.2 ADHD (ATTENTION DEFICIT HYPERACTIVITY DISORDER), COMBINED TYPE: Primary | ICD-10-CM

## 2023-12-11 DIAGNOSIS — F90.2 ADHD (ATTENTION DEFICIT HYPERACTIVITY DISORDER), COMBINED TYPE: Primary | Chronic | ICD-10-CM

## 2023-12-11 PROCEDURE — 99214 OFFICE O/P EST MOD 30 MIN: CPT | Performed by: FAMILY MEDICINE

## 2023-12-11 PROCEDURE — G8427 DOCREV CUR MEDS BY ELIG CLIN: HCPCS | Performed by: FAMILY MEDICINE

## 2023-12-11 RX ORDER — DEXTROAMPHETAMINE SACCHARATE, AMPHETAMINE ASPARTATE MONOHYDRATE, DEXTROAMPHETAMINE SULFATE AND AMPHETAMINE SULFATE 6.25; 6.25; 6.25; 6.25 MG/1; MG/1; MG/1; MG/1
25 CAPSULE, EXTENDED RELEASE ORAL 2 TIMES DAILY
Qty: 60 CAPSULE | Refills: 0 | Status: SHIPPED | OUTPATIENT
Start: 2023-12-11 | End: 2023-12-11 | Stop reason: SDUPTHER

## 2023-12-11 RX ORDER — DEXTROAMPHETAMINE SACCHARATE, AMPHETAMINE ASPARTATE, DEXTROAMPHETAMINE SULFATE AND AMPHETAMINE SULFATE 7.5; 7.5; 7.5; 7.5 MG/1; MG/1; MG/1; MG/1
30 TABLET ORAL 2 TIMES DAILY
Qty: 60 TABLET | Refills: 0 | Status: SHIPPED | OUTPATIENT
Start: 2023-12-11 | End: 2024-01-10

## 2023-12-11 RX ORDER — DEXTROAMPHETAMINE SACCHARATE, AMPHETAMINE ASPARTATE, DEXTROAMPHETAMINE SULFATE AND AMPHETAMINE SULFATE 5; 5; 5; 5 MG/1; MG/1; MG/1; MG/1
20 TABLET ORAL DAILY
Qty: 30 TABLET | Refills: 0 | OUTPATIENT
Start: 2023-12-11 | End: 2024-01-10

## 2023-12-11 RX ORDER — DEXTROAMPHETAMINE SACCHARATE, AMPHETAMINE ASPARTATE MONOHYDRATE, DEXTROAMPHETAMINE SULFATE AND AMPHETAMINE SULFATE 6.25; 6.25; 6.25; 6.25 MG/1; MG/1; MG/1; MG/1
25 CAPSULE, EXTENDED RELEASE ORAL 2 TIMES DAILY
Qty: 60 CAPSULE | Refills: 0 | Status: SHIPPED | OUTPATIENT
Start: 2023-12-11 | End: 2023-12-11

## 2023-12-11 ASSESSMENT — ENCOUNTER SYMPTOMS
CONSTIPATION: 0
COUGH: 0
SHORTNESS OF BREATH: 0
DIARRHEA: 0
VOMITING: 0
NAUSEA: 0

## 2023-12-11 NOTE — TELEPHONE ENCOUNTER
The pharmacy also called He doesn't want XR prefers IR works better for him. Please send new RX. Pharmacist will dispose of the other RX.

## 2023-12-11 NOTE — PROGRESS NOTES
Bridget Love, was evaluated through a synchronous (real-time) audio-video encounter. The patient (or guardian if applicable) is aware that this is a billable service, which includes applicable co-pays. This Virtual Visit was conducted with patient's (and/or legal guardian's) consent. Patient identification was verified, and a caregiver was present when appropriate. The patient was located at Home: [facility] [address] [facility] [address]   Provider was located at Home ([address] [facility]): [address] [facility] (:  1995) is a Established patient, presenting virtually for evaluation of the following:    Assessment & Plan   Below is the assessment and plan developed based on review of pertinent history, physical exam, labs, studies, and medications. 1. ADHD (attention deficit hyperactivity disorder), combined type  -     amphetamine-dextroamphetamine (ADDERALL XR) 25 MG extended release capsule; Take 1 capsule by mouth in the morning and at bedtime for 30 days. Max Daily Amount: 50 mg, Disp-60 capsule, R-0Normal    No follow-ups on file. Subjective   23: Telehealth    Hypertension:  Long discussion regarding decreasing his sodium intake in order to improve blood pressure. Pt wants to eventually get off his blood pressure meds in general.     ADHD - does not feel like the extended release 30 mg Adderall is helping  Will change to 25 mg Adderall short acting BID  Prev was on 20 mg Adderall short acting, but this was not strong enough    Follow in 1 Mo. Review of Systems   Constitutional:  Negative for chills and fever. Respiratory:  Negative for cough and shortness of breath. Cardiovascular:  Negative for leg swelling. Gastrointestinal:  Negative for constipation, diarrhea, nausea and vomiting. Endocrine: Negative for polyuria. Genitourinary:  Negative for frequency. Skin:  Negative for rash.           Objective   Patient-Reported Vitals  No data recorded     Physical

## 2024-01-01 NOTE — TELEPHONE ENCOUNTER
He needs to make an appointment

## 2024-01-08 DIAGNOSIS — F90.2 ADHD (ATTENTION DEFICIT HYPERACTIVITY DISORDER), COMBINED TYPE: ICD-10-CM

## 2024-01-08 RX ORDER — DEXTROAMPHETAMINE SACCHARATE, AMPHETAMINE ASPARTATE, DEXTROAMPHETAMINE SULFATE AND AMPHETAMINE SULFATE 7.5; 7.5; 7.5; 7.5 MG/1; MG/1; MG/1; MG/1
30 TABLET ORAL 2 TIMES DAILY
Qty: 60 TABLET | Refills: 0 | Status: SHIPPED | OUTPATIENT
Start: 2024-01-08 | End: 2024-02-07

## 2024-01-08 NOTE — TELEPHONE ENCOUNTER
Medication:   Requested Prescriptions     Pending Prescriptions Disp Refills    amphetamine-dextroamphetamine (ADDERALL, 30MG,) 30 MG tablet 60 tablet 0     Sig: Take 1 tablet by mouth 2 times daily for 30 days. Max Daily Amount: 60 mg        Last appt: 12/11/2023     Please advise

## 2024-02-05 ENCOUNTER — TELEPHONE (OUTPATIENT)
Dept: PRIMARY CARE CLINIC | Age: 29
End: 2024-02-05

## 2024-02-05 DIAGNOSIS — F90.2 ADHD (ATTENTION DEFICIT HYPERACTIVITY DISORDER), COMBINED TYPE: ICD-10-CM

## 2024-02-05 RX ORDER — DEXTROAMPHETAMINE SACCHARATE, AMPHETAMINE ASPARTATE, DEXTROAMPHETAMINE SULFATE AND AMPHETAMINE SULFATE 7.5; 7.5; 7.5; 7.5 MG/1; MG/1; MG/1; MG/1
30 TABLET ORAL 2 TIMES DAILY
Qty: 60 TABLET | Refills: 0 | Status: SHIPPED | OUTPATIENT
Start: 2024-02-05 | End: 2024-03-06

## 2024-02-05 NOTE — TELEPHONE ENCOUNTER
Called pt to inform him his rx is ready for  but he first needs to leave a urine sample. Pt expressed understanding

## 2024-02-14 DIAGNOSIS — F90.2 ADHD (ATTENTION DEFICIT HYPERACTIVITY DISORDER), COMBINED TYPE: ICD-10-CM

## 2024-02-14 RX ORDER — DEXTROAMPHETAMINE SACCHARATE, AMPHETAMINE ASPARTATE, DEXTROAMPHETAMINE SULFATE AND AMPHETAMINE SULFATE 7.5; 7.5; 7.5; 7.5 MG/1; MG/1; MG/1; MG/1
30 TABLET ORAL 2 TIMES DAILY
Qty: 60 TABLET | Refills: 0 | Status: SHIPPED | OUTPATIENT
Start: 2024-02-14 | End: 2024-03-15

## 2024-02-14 NOTE — PROGRESS NOTES
Patient actually did have a urine screen and may so is due for his next urine screen in May.  OARRS report reviewed and appropriate medication refilled.

## 2024-03-06 DIAGNOSIS — F41.0 ANXIETY ATTACK: Chronic | ICD-10-CM

## 2024-03-06 RX ORDER — BUSPIRONE HYDROCHLORIDE 5 MG/1
TABLET ORAL
Qty: 270 TABLET | Refills: 0 | Status: SHIPPED | OUTPATIENT
Start: 2024-03-06

## 2024-03-06 NOTE — TELEPHONE ENCOUNTER
Medication:   Requested Prescriptions     Pending Prescriptions Disp Refills    busPIRone (BUSPAR) 5 MG tablet [Pharmacy Med Name: busPIRone HCL 5 MG TABLET] 270 tablet 0     Sig: TAKE ONE TABLET BY MOUTH THREE TIMES A DAY        Last appt: 12/11/2023

## 2024-03-07 DIAGNOSIS — F90.2 ADHD (ATTENTION DEFICIT HYPERACTIVITY DISORDER), COMBINED TYPE: ICD-10-CM

## 2024-03-07 NOTE — TELEPHONE ENCOUNTER
Medication:   Requested Prescriptions     Pending Prescriptions Disp Refills    amphetamine-dextroamphetamine (ADDERALL, 30MG,) 30 MG tablet 60 tablet 0     Sig: Take 1 tablet by mouth 2 times daily for 30 days. Give generic Max Daily Amount: 60 mg        Last Filled:      Patient Phone Number: [phone] (home)     Last appt: 12/11/2023   Next appt: Visit date not found    Last OARRS:       8/7/2023     8:46 AM   RX Monitoring   Periodic Controlled Substance Monitoring Possible medication side effects, risk of tolerance/dependence & alternative treatments discussed.;No signs of potential drug abuse or diversion identified.

## 2024-03-10 RX ORDER — DEXTROAMPHETAMINE SACCHARATE, AMPHETAMINE ASPARTATE, DEXTROAMPHETAMINE SULFATE AND AMPHETAMINE SULFATE 7.5; 7.5; 7.5; 7.5 MG/1; MG/1; MG/1; MG/1
30 TABLET ORAL 2 TIMES DAILY
Qty: 60 TABLET | Refills: 0 | Status: SHIPPED | OUTPATIENT
Start: 2024-03-10 | End: 2024-04-09

## 2024-04-06 DIAGNOSIS — F90.2 ADHD (ATTENTION DEFICIT HYPERACTIVITY DISORDER), COMBINED TYPE: ICD-10-CM

## 2024-04-08 RX ORDER — DEXTROAMPHETAMINE SACCHARATE, AMPHETAMINE ASPARTATE, DEXTROAMPHETAMINE SULFATE AND AMPHETAMINE SULFATE 7.5; 7.5; 7.5; 7.5 MG/1; MG/1; MG/1; MG/1
30 TABLET ORAL 2 TIMES DAILY
Qty: 60 TABLET | Refills: 0 | OUTPATIENT
Start: 2024-04-08 | End: 2024-05-08

## 2024-04-10 DIAGNOSIS — I10 ESSENTIAL HYPERTENSION: ICD-10-CM

## 2024-04-10 RX ORDER — LISINOPRIL 20 MG/1
TABLET ORAL
Qty: 90 TABLET | Refills: 1 | Status: SHIPPED | OUTPATIENT
Start: 2024-04-10

## 2024-04-10 NOTE — TELEPHONE ENCOUNTER
Medication:   Requested Prescriptions     Pending Prescriptions Disp Refills    lisinopril (PRINIVIL;ZESTRIL) 20 MG tablet [Pharmacy Med Name: LISINOPRIL 20 MG TABLET] 90 tablet 1     Sig: TAKE 1 TABLET BY MOUTH DAILY        Last Filled:      Patient Phone Number: [phone] (home)     Last appt: 12/11/2023   Next appt: Visit date not found    Last OARRS:       8/7/2023     8:46 AM   RX Monitoring   Periodic Controlled Substance Monitoring Possible medication side effects, risk of tolerance/dependence & alternative treatments discussed.;No signs of potential drug abuse or diversion identified.

## 2024-04-12 DIAGNOSIS — F90.2 ADHD (ATTENTION DEFICIT HYPERACTIVITY DISORDER), COMBINED TYPE: ICD-10-CM

## 2024-04-15 RX ORDER — DEXTROAMPHETAMINE SACCHARATE, AMPHETAMINE ASPARTATE, DEXTROAMPHETAMINE SULFATE AND AMPHETAMINE SULFATE 7.5; 7.5; 7.5; 7.5 MG/1; MG/1; MG/1; MG/1
30 TABLET ORAL 2 TIMES DAILY
Qty: 60 TABLET | Refills: 0 | Status: SHIPPED | OUTPATIENT
Start: 2024-04-15 | End: 2024-05-15

## 2024-05-10 DIAGNOSIS — R00.2 INTERMITTENT PALPITATIONS: Chronic | ICD-10-CM

## 2024-05-10 RX ORDER — PROPRANOLOL HYDROCHLORIDE 80 MG/1
80 CAPSULE, EXTENDED RELEASE ORAL DAILY
Qty: 90 CAPSULE | Refills: 1 | Status: SHIPPED | OUTPATIENT
Start: 2024-05-10

## 2024-05-10 NOTE — TELEPHONE ENCOUNTER
Medication:   Requested Prescriptions     Pending Prescriptions Disp Refills    propranolol (INDERAL LA) 80 MG extended release capsule [Pharmacy Med Name: PROPRANOLOL ER 80 MG CAPSULE] 90 capsule 1     Sig: TAKE 1 CAPSULE BY MOUTH DAILY        Last Filled:      Patient Phone Number: [phone] (home)     Last appt: 12/11/2023   Next appt: Visit date not found    Last OARRS:       8/7/2023     8:46 AM   RX Monitoring   Periodic Controlled Substance Monitoring Possible medication side effects, risk of tolerance/dependence & alternative treatments discussed.;No signs of potential drug abuse or diversion identified.

## 2024-05-13 DIAGNOSIS — R00.2 INTERMITTENT PALPITATIONS: Chronic | ICD-10-CM

## 2024-05-13 DIAGNOSIS — F41.0 ANXIETY ATTACK: Chronic | ICD-10-CM

## 2024-05-13 DIAGNOSIS — F90.2 ADHD (ATTENTION DEFICIT HYPERACTIVITY DISORDER), COMBINED TYPE: ICD-10-CM

## 2024-05-13 DIAGNOSIS — I10 ESSENTIAL HYPERTENSION: ICD-10-CM

## 2024-05-13 RX ORDER — DEXTROAMPHETAMINE SACCHARATE, AMPHETAMINE ASPARTATE, DEXTROAMPHETAMINE SULFATE AND AMPHETAMINE SULFATE 7.5; 7.5; 7.5; 7.5 MG/1; MG/1; MG/1; MG/1
30 TABLET ORAL 2 TIMES DAILY
Qty: 60 TABLET | Refills: 0 | Status: CANCELLED | OUTPATIENT
Start: 2024-05-13 | End: 2024-06-12

## 2024-05-13 RX ORDER — BUSPIRONE HYDROCHLORIDE 5 MG/1
5 TABLET ORAL 3 TIMES DAILY
Qty: 270 TABLET | Refills: 0 | Status: SHIPPED | OUTPATIENT
Start: 2024-05-13

## 2024-05-13 RX ORDER — LISINOPRIL 20 MG/1
TABLET ORAL
Qty: 90 TABLET | Refills: 1 | Status: SHIPPED | OUTPATIENT
Start: 2024-05-13

## 2024-05-13 RX ORDER — PROPRANOLOL HYDROCHLORIDE 80 MG/1
80 CAPSULE, EXTENDED RELEASE ORAL DAILY
Qty: 90 CAPSULE | Refills: 1 | OUTPATIENT
Start: 2024-05-13

## 2024-05-13 NOTE — TELEPHONE ENCOUNTER
Medication:   Requested Prescriptions     Pending Prescriptions Disp Refills    busPIRone (BUSPAR) 5 MG tablet 270 tablet 0     Sig: Take 1 tablet by mouth 3 times daily    lisinopril (PRINIVIL;ZESTRIL) 20 MG tablet 90 tablet 1     Sig: TAKE 1 TABLET BY MOUTH DAILY    propranolol (INDERAL LA) 80 MG extended release capsule 90 capsule 1     Sig: Take 1 capsule by mouth daily        Last Filled:      Patient Phone Number: [phone] (home)     Last appt: 12/11/2023   Next appt: Visit date not found    Last OARRS:       8/7/2023     8:46 AM   RX Monitoring   Periodic Controlled Substance Monitoring Possible medication side effects, risk of tolerance/dependence & alternative treatments discussed.;No signs of potential drug abuse or diversion identified.

## 2024-05-15 ENCOUNTER — TELEMEDICINE (OUTPATIENT)
Dept: PRIMARY CARE CLINIC | Age: 29
End: 2024-05-15
Payer: COMMERCIAL

## 2024-05-15 DIAGNOSIS — F90.2 ADHD (ATTENTION DEFICIT HYPERACTIVITY DISORDER), COMBINED TYPE: Primary | ICD-10-CM

## 2024-05-15 DIAGNOSIS — I10 ESSENTIAL HYPERTENSION: ICD-10-CM

## 2024-05-15 DIAGNOSIS — F41.0 ANXIETY ATTACK: ICD-10-CM

## 2024-05-15 DIAGNOSIS — R00.2 INTERMITTENT PALPITATIONS: ICD-10-CM

## 2024-05-15 PROCEDURE — 99214 OFFICE O/P EST MOD 30 MIN: CPT | Performed by: FAMILY MEDICINE

## 2024-05-15 PROCEDURE — G8419 CALC BMI OUT NRM PARAM NOF/U: HCPCS | Performed by: FAMILY MEDICINE

## 2024-05-15 PROCEDURE — G8427 DOCREV CUR MEDS BY ELIG CLIN: HCPCS | Performed by: FAMILY MEDICINE

## 2024-05-15 PROCEDURE — 1036F TOBACCO NON-USER: CPT | Performed by: FAMILY MEDICINE

## 2024-05-15 PROCEDURE — G2211 COMPLEX E/M VISIT ADD ON: HCPCS | Performed by: FAMILY MEDICINE

## 2024-05-15 RX ORDER — DEXTROAMPHETAMINE SACCHARATE, AMPHETAMINE ASPARTATE, DEXTROAMPHETAMINE SULFATE AND AMPHETAMINE SULFATE 7.5; 7.5; 7.5; 7.5 MG/1; MG/1; MG/1; MG/1
30 TABLET ORAL 2 TIMES DAILY
Qty: 60 TABLET | Refills: 0 | Status: SHIPPED | OUTPATIENT
Start: 2024-05-15 | End: 2024-06-14

## 2024-05-15 RX ORDER — DEXTROAMPHETAMINE SACCHARATE, AMPHETAMINE ASPARTATE, DEXTROAMPHETAMINE SULFATE AND AMPHETAMINE SULFATE 7.5; 7.5; 7.5; 7.5 MG/1; MG/1; MG/1; MG/1
30 TABLET ORAL 2 TIMES DAILY
Qty: 60 TABLET | Refills: 0 | Status: SHIPPED | OUTPATIENT
Start: 2024-06-14 | End: 2024-07-14

## 2024-05-15 RX ORDER — DEXTROAMPHETAMINE SACCHARATE, AMPHETAMINE ASPARTATE, DEXTROAMPHETAMINE SULFATE AND AMPHETAMINE SULFATE 7.5; 7.5; 7.5; 7.5 MG/1; MG/1; MG/1; MG/1
30 TABLET ORAL 2 TIMES DAILY
Qty: 60 TABLET | Refills: 0 | Status: SHIPPED | OUTPATIENT
Start: 2024-07-14 | End: 2024-08-13

## 2024-05-15 SDOH — ECONOMIC STABILITY: FOOD INSECURITY: WITHIN THE PAST 12 MONTHS, THE FOOD YOU BOUGHT JUST DIDN'T LAST AND YOU DIDN'T HAVE MONEY TO GET MORE.: NEVER TRUE

## 2024-05-15 SDOH — ECONOMIC STABILITY: INCOME INSECURITY: HOW HARD IS IT FOR YOU TO PAY FOR THE VERY BASICS LIKE FOOD, HOUSING, MEDICAL CARE, AND HEATING?: NOT HARD AT ALL

## 2024-05-15 SDOH — ECONOMIC STABILITY: FOOD INSECURITY: WITHIN THE PAST 12 MONTHS, YOU WORRIED THAT YOUR FOOD WOULD RUN OUT BEFORE YOU GOT MONEY TO BUY MORE.: NEVER TRUE

## 2024-05-15 SDOH — ECONOMIC STABILITY: HOUSING INSECURITY
IN THE LAST 12 MONTHS, WAS THERE A TIME WHEN YOU DID NOT HAVE A STEADY PLACE TO SLEEP OR SLEPT IN A SHELTER (INCLUDING NOW)?: NO

## 2024-05-15 ASSESSMENT — ENCOUNTER SYMPTOMS
COUGH: 0
VOMITING: 0
CONSTIPATION: 0
DIARRHEA: 0
NAUSEA: 0

## 2024-05-15 ASSESSMENT — PATIENT HEALTH QUESTIONNAIRE - PHQ9
SUM OF ALL RESPONSES TO PHQ9 QUESTIONS 1 & 2: 0
SUM OF ALL RESPONSES TO PHQ QUESTIONS 1-9: 0
2. FEELING DOWN, DEPRESSED OR HOPELESS: NOT AT ALL
SUM OF ALL RESPONSES TO PHQ QUESTIONS 1-9: 0

## 2024-05-15 NOTE — PROGRESS NOTES
Luke Harrison, was evaluated through a synchronous (real-time) audio-video encounter. The patient (or guardian if applicable) is aware that this is a billable service, which includes applicable co-pays. This Virtual Visit was conducted with patient's (and/or legal guardian's) consent. Patient identification was verified, and a caregiver was present when appropriate.   The patient was located at Home: [address] [place] [address].  [id number]  [facility] [address]  Provider was located at Facility (Appt Dept): [address] [place] [id number]  Confirm you are appropriately licensed, registered, or certified to deliver care in the state where the patient is located as indicated above. If you are not or unsure, please re-schedule the visit: Yes, I confirm.     Luke Harrison (:  1995) is a Established patient, presenting virtually for evaluation of the following:    Assessment & Plan   Below is the assessment and plan developed based on review of pertinent history, physical exam, labs, studies, and medications.  1. ADHD (attention deficit hyperactivity disorder), combined type  Assessment & Plan:   At goal, continue current medications  continues on 30 mg Adderall BID  Orders:  -     amphetamine-dextroamphetamine (ADDERALL) 30 MG tablet; Take 1 tablet by mouth 2 times daily for 30 days. Max Daily Amount: 60 mg, Disp-60 tablet, R-0Normal  -     amphetamine-dextroamphetamine (ADDERALL) 30 MG tablet; Take 1 tablet by mouth 2 times daily for 30 days. Max Daily Amount: 60 mg, Disp-60 tablet, R-0Normal  -     amphetamine-dextroamphetamine (ADDERALL) 30 MG tablet; Take 1 tablet by mouth 2 times daily for 30 days. Max Daily Amount: 60 mg, Disp-60 tablet, R-0Normal  2. Essential hypertension  Assessment & Plan:  Stable - cont lisinopril   3. Intermittent palpitations  Assessment & Plan:   At goal, continue current medications.  Cont propranolol 80, buspar for anxiety  4. Anxiety attack    Return in 3 months (on

## 2024-05-22 NOTE — TELEPHONE ENCOUNTER
Didi Murillo MA   Cc: Jaqueline Kelley MA             Pleased to report that the Holter study showed no arrhythmia underlying your palpitations and panic attack.  For the panic attack while vaping nicotine, the heart rhythm was normal.     Heart rate ranged from , average 88, for your information.  At 9 PM, you got your heart rate up to 160, at 9 AM you showed the other increase in heart rate to around 130-140.  I presume this was when you were vaping nicotine? [facility] Progress Note:    Patient: Alissa Humphreys Date: 2024   : 1975 Attending: Alberta Kowalski MD         Admission date: 2024    ICU admit date:  2024  Intubation date:  N/A    Chief Complaint: Falls, weakness    Alissa Humphreys is a 49 year old female  with history of seizure disorder, ETOH, tobacco abuse and substance abuse who presented to the ED with c/o falls and weakness. Patient was reportedly found down by her landlord the day prior to admission, unclear how long she had been down for.  Patient endorsed hitting her head when she fell but is unsure if she lost consciousness. Labs in ED showed Na 126, , ALT 70, , ETOH negative. (Initial K 5.5 but thought to be hemolyzed, on repeat K 3.7.) CT head demonstrated probable subacute left-sided subdural hematoma with associated 15 mm left to right midline shift, some left-sided uncal herniation and left to right subfalcine herniation with effacement of the right lateral ventricle and mass effect on underlying right parenchyma. CT C-spine negative for acute fractures. Patient was loaded with 1 gm Keppra for seizure prophylaxis and started on nicardipine and subsequently transferred to [facility] for further evaluation. NSGY consulted. Underwent left sided ruy hole for evacuation of SDH and placement of subdural drain on . CT  with improving left subdural hematoma after ruy hole/drain placement; mass effect improving.     24 hour events: No acute events. Awaiting IPR pending insurance auth.     Impression:  -- Traumatic left-sided subdural hematoma with midline shift and left-sided uncal herniation   -- S/p ruy hole for evacuation ( Shilpa)   -- S/p MMA embolization ( Kateryna)  -- Hypertension  -- Hx Seizure disorder  -- Tobacco use disorder  -- ETOH   -- Hx Substance abuse  -- Hyponatremia  -- Elevated transaminases    ASSESSMENT/PLAN:    Neuro: Awake and alert, sitting up in chair. Oriented  to hospital, off to exact one. Forgetful. Pain controlled. Follows commands x 4 equally. Speech is mumbled, but suspect this is her baseline.  EEG was negative. No signs of EtOH withdrawal.     -- Neurosurgery signed off   -- drain removed 5/20, CT 5/21 reviewed, stable   -- Follow up in 2 weeks with CT prior  -- Goal SBP < 160  -- Neuro checks q4h  -- Goal normonatremia   -- Keppra 750 mg BID x 7 days  -- Folic acid and thiamine  -- CIWA  -- PRN Tylenol  -- PT/OT  -- IPR pending insurance auth    Pulmonary: Adequate saturations on room air.  -- Minimal O2 to keep sats > 90%    CV: SR. Normotensive. ECHO EF 65%.   -- SBP goal as above     Renal: Normal renal function, lytes reviewed. Voiding.     GI: LFTs normalized. Denies abdominal complaints. LBM 1 (05/21/24 1700).  -- SLP following  -- Modified diet  -- Scheduled bowel regimen     Heme: Mild anemia, H/H unchanged from prior. No thrombocytopenia.   -- OK to resume chemical dvt ppx     Endocrine: Blood glucose maintained intrinsically within ICU target range.    ID: Afebrile. No leukocytosis. Doubt acute infection at this time.  -- doxy x 10 days per NS     Goals/Disposition: Patient medically stable for transfer out of Intensive Care. Summary of hospitalization and active problems given to Dr. Zuluaga group via EPIC chat on 5/21/2024, Dr. Pyle to take over as attending upon transfer out of ICU.          PERTINENT DIAGNOSTICS/PROCEDURES:  CT Head 5/16/2024:  1. Probable subacute, left-sided subdural hematoma measuring up to 19 mm in thickness. This is associated with 15 mm of left to right midline shift,  some left-sided uncal herniation, and left to right subfalcine herniation  with effacement of the right lateral ventricle and mass effect on the  underlying right parenchyma.  2. No acute calvarial or skull base fracture.    BEST PRACTICES:  - VTE prophylaxis: SCDs,  subQ heparin  - SUP: N/A  - LDA: PIV   - Nutrition: modified diet   - Therapy/mobilization:  PT/OT      ================================================================    Subjective: Slept well overnight. Endorses mild headache that is tolerable. Otherwise denies complaints.     I/O last 3 completed shifts:  In: 1440 [P.O.:1440]  Out: 450 [Urine:450]  I/O this shift:  In: 540 [P.O.:540]  Out: 0     Vital Last Value 24 Hour Range   Temperature 98.3 °F (36.8 °C) (05/22/24 1100) Temp  Min: 97.3 °F (36.3 °C)  Max: 98.4 °F (36.9 °C)   Pulse 88 (05/22/24 1030) Pulse  Min: 54  Max: 88   Respiratory 16 (05/22/24 0800) Resp  Min: 14  Max: 18   Non-Invasive  Blood Pressure 126/77 (05/22/24 0800) BP  Min: 111/71  Max: 137/88   Pulse Oximetry 99 % (05/22/24 0800) SpO2  Min: 98 %  Max: 100 %   Arterial   Blood Pressure   No data recorded        Physical Exam:  General: appears older than stated age, NAD, sitting up in the chair  Neuro: Awake and alert,  oriented to person, hospital (off to exact name), and month. Follows commands, strength equal throughout   PERRL 3 mm. EOM intact.    HEENT: Normocephalic, atraumatic, sclera clear, dry mucus membranes, poor dentition.  Neck: Supple, trachea midline  Chest: Chest rise symmetrical. Lung sounds clear bilaterally, no rhonchi, rales, or wheezes. No accessory muscle use.   Heart: NSR. S1S2, no m/g/r  Abdomen: Soft, non-tender, non-distended. Bowel sounds active.   Extremities: No cyanosis or edema. Radial/DP 1+ bilaterally.  Skin: Warm, dry, no rashes or lesions. Left incision ESPERANZA.         Pertinent Reviewed: Allergies, Medications, Labs, Imaging, and Physician and Nursing Notes    ACCS Attestation       Ms. Humphreys is ill as documented. I evaluated the patient and reviewed imaging and laboratory data. I discussed events and interventions with Nathan Kowalski MD  who has reviewed the diagnostic and treatment strategy. Subsequent hospital care level 3    FIGUEROA Lemus  [facility]  Preferred contact Epic chat

## 2024-07-10 ENCOUNTER — TELEPHONE (OUTPATIENT)
Dept: PRIMARY CARE CLINIC | Age: 29
End: 2024-07-10

## 2024-07-10 NOTE — TELEPHONE ENCOUNTER
Patient called asking for refill on   Next visit 8/7/24    amphetamine-dextroamphetamine (ADDERALL) 30 MG tablet     Please call pt when ready

## 2024-07-12 DIAGNOSIS — R00.2 INTERMITTENT PALPITATIONS: Chronic | ICD-10-CM

## 2024-07-12 NOTE — TELEPHONE ENCOUNTER
PT called in regarding the refill for this medication.     Pt says that he is going out of town tomorrow and won't be able to pick this up tomorrow. The pharmacy adv PT to call the office to have Dr. Shetty or her MA reach out to them stating it's okay to refill medication a day early.    Please give PT a call back to adv if this has been done: [phone]. PT says that it is okay to leave a message if he does not answer.

## 2024-07-15 RX ORDER — PROPRANOLOL HCL 60 MG
60 CAPSULE, EXTENDED RELEASE 24HR ORAL DAILY
Qty: 90 CAPSULE | Refills: 0 | OUTPATIENT
Start: 2024-07-15

## 2024-08-11 DIAGNOSIS — F90.2 ADHD (ATTENTION DEFICIT HYPERACTIVITY DISORDER), COMBINED TYPE: ICD-10-CM

## 2024-08-12 ENCOUNTER — OFFICE VISIT (OUTPATIENT)
Dept: PRIMARY CARE CLINIC | Age: 29
End: 2024-08-12
Payer: COMMERCIAL

## 2024-08-12 VITALS
OXYGEN SATURATION: 98 % | TEMPERATURE: 98.4 F | BODY MASS INDEX: 25.11 KG/M2 | WEIGHT: 160 LBS | DIASTOLIC BLOOD PRESSURE: 76 MMHG | HEART RATE: 81 BPM | HEIGHT: 67 IN | SYSTOLIC BLOOD PRESSURE: 119 MMHG

## 2024-08-12 DIAGNOSIS — R00.2 INTERMITTENT PALPITATIONS: Chronic | ICD-10-CM

## 2024-08-12 DIAGNOSIS — I10 ESSENTIAL HYPERTENSION: Primary | ICD-10-CM

## 2024-08-12 DIAGNOSIS — F90.2 ADHD (ATTENTION DEFICIT HYPERACTIVITY DISORDER), COMBINED TYPE: ICD-10-CM

## 2024-08-12 DIAGNOSIS — F41.0 ANXIETY ATTACK: Chronic | ICD-10-CM

## 2024-08-12 PROCEDURE — G8427 DOCREV CUR MEDS BY ELIG CLIN: HCPCS | Performed by: FAMILY MEDICINE

## 2024-08-12 PROCEDURE — G8419 CALC BMI OUT NRM PARAM NOF/U: HCPCS | Performed by: FAMILY MEDICINE

## 2024-08-12 PROCEDURE — 3078F DIAST BP <80 MM HG: CPT | Performed by: FAMILY MEDICINE

## 2024-08-12 PROCEDURE — 99214 OFFICE O/P EST MOD 30 MIN: CPT | Performed by: FAMILY MEDICINE

## 2024-08-12 PROCEDURE — 3074F SYST BP LT 130 MM HG: CPT | Performed by: FAMILY MEDICINE

## 2024-08-12 PROCEDURE — 1036F TOBACCO NON-USER: CPT | Performed by: FAMILY MEDICINE

## 2024-08-12 RX ORDER — DEXTROAMPHETAMINE SACCHARATE, AMPHETAMINE ASPARTATE, DEXTROAMPHETAMINE SULFATE AND AMPHETAMINE SULFATE 5; 5; 5; 5 MG/1; MG/1; MG/1; MG/1
20 TABLET ORAL DAILY
Qty: 30 TABLET | Refills: 0 | Status: SHIPPED | OUTPATIENT
Start: 2024-08-12 | End: 2024-09-11

## 2024-08-12 RX ORDER — BUSPIRONE HYDROCHLORIDE 5 MG/1
5 TABLET ORAL 2 TIMES DAILY
Qty: 180 TABLET | Refills: 1 | Status: SHIPPED | OUTPATIENT
Start: 2024-08-12 | End: 2025-02-08

## 2024-08-12 RX ORDER — DEXTROAMPHETAMINE SACCHARATE, AMPHETAMINE ASPARTATE, DEXTROAMPHETAMINE SULFATE AND AMPHETAMINE SULFATE 2.5; 2.5; 2.5; 2.5 MG/1; MG/1; MG/1; MG/1
10 TABLET ORAL DAILY
Qty: 30 TABLET | Refills: 0 | Status: SHIPPED | OUTPATIENT
Start: 2024-08-12 | End: 2024-09-11

## 2024-08-12 RX ORDER — DEXTROAMPHETAMINE SACCHARATE, AMPHETAMINE ASPARTATE, DEXTROAMPHETAMINE SULFATE AND AMPHETAMINE SULFATE 7.5; 7.5; 7.5; 7.5 MG/1; MG/1; MG/1; MG/1
30 TABLET ORAL 2 TIMES DAILY
Qty: 60 TABLET | Refills: 0 | Status: CANCELLED | OUTPATIENT
Start: 2024-08-12 | End: 2024-09-11

## 2024-08-12 RX ORDER — DEXTROAMPHETAMINE SACCHARATE, AMPHETAMINE ASPARTATE, DEXTROAMPHETAMINE SULFATE AND AMPHETAMINE SULFATE 2.5; 2.5; 2.5; 2.5 MG/1; MG/1; MG/1; MG/1
10 TABLET ORAL DAILY
Qty: 30 TABLET | Refills: 0 | Status: SHIPPED | OUTPATIENT
Start: 2024-09-11 | End: 2024-10-11

## 2024-08-12 RX ORDER — DEXTROAMPHETAMINE SACCHARATE, AMPHETAMINE ASPARTATE, DEXTROAMPHETAMINE SULFATE AND AMPHETAMINE SULFATE 5; 5; 5; 5 MG/1; MG/1; MG/1; MG/1
20 TABLET ORAL DAILY
Qty: 30 TABLET | Refills: 0 | Status: SHIPPED | OUTPATIENT
Start: 2024-09-11 | End: 2024-10-11

## 2024-08-12 RX ORDER — DEXTROAMPHETAMINE SACCHARATE, AMPHETAMINE ASPARTATE, DEXTROAMPHETAMINE SULFATE AND AMPHETAMINE SULFATE 7.5; 7.5; 7.5; 7.5 MG/1; MG/1; MG/1; MG/1
30 TABLET ORAL 2 TIMES DAILY
Qty: 60 TABLET | Refills: 0 | OUTPATIENT
Start: 2024-08-12 | End: 2024-09-11

## 2024-08-12 RX ORDER — PROPRANOLOL HYDROCHLORIDE 80 MG/1
80 CAPSULE, EXTENDED RELEASE ORAL DAILY
Qty: 90 CAPSULE | Refills: 1 | Status: SHIPPED | OUTPATIENT
Start: 2024-08-12

## 2024-08-12 SDOH — ECONOMIC STABILITY: INCOME INSECURITY: HOW HARD IS IT FOR YOU TO PAY FOR THE VERY BASICS LIKE FOOD, HOUSING, MEDICAL CARE, AND HEATING?: NOT HARD AT ALL

## 2024-08-12 SDOH — ECONOMIC STABILITY: FOOD INSECURITY: WITHIN THE PAST 12 MONTHS, THE FOOD YOU BOUGHT JUST DIDN'T LAST AND YOU DIDN'T HAVE MONEY TO GET MORE.: NEVER TRUE

## 2024-08-12 SDOH — ECONOMIC STABILITY: FOOD INSECURITY: WITHIN THE PAST 12 MONTHS, YOU WORRIED THAT YOUR FOOD WOULD RUN OUT BEFORE YOU GOT MONEY TO BUY MORE.: NEVER TRUE

## 2024-08-12 ASSESSMENT — PATIENT HEALTH QUESTIONNAIRE - PHQ9
1. LITTLE INTEREST OR PLEASURE IN DOING THINGS: NOT AT ALL
SUM OF ALL RESPONSES TO PHQ QUESTIONS 1-9: 0
SUM OF ALL RESPONSES TO PHQ9 QUESTIONS 1 & 2: 0
2. FEELING DOWN, DEPRESSED OR HOPELESS: NOT AT ALL
SUM OF ALL RESPONSES TO PHQ QUESTIONS 1-9: 0

## 2024-08-12 ASSESSMENT — ENCOUNTER SYMPTOMS
CONSTIPATION: 0
VOMITING: 0
COUGH: 0
NAUSEA: 0
SHORTNESS OF BREATH: 0
DIARRHEA: 0

## 2024-08-12 NOTE — PROGRESS NOTES
Luke Harrison (:  1995) is a 29 y.o. male,Established patient, here for evaluation of the following chief complaint(s):  medication follow up      SUBJECTIVE:  24:  Has been very busy with his family, moved back in with them    Wants to go back down to 20 mg adderall, then 10 mg dose  --- Wants to do 20 mg in the morning and 10 mg in the evening  --- will do for 2 mo    Anxiety:  Doing well on buspar BID, no longer doing TID dosing          5.15.24:  Doing well on adderall 30 mg BID  No issues with filling meds, did have some insurance changes but has been able to get his meds as needed    Hypertension:  - well controlled on lisinopril    Anxiety:  - continues buspar, propranolol 80 prn.       23:  Follow after increasing dose to adderall 30 xr + adderall 20 mg short acting  -- doing well on current dosing    Does feel like he wants to drop down the propranolol dose to 60 mg, as he is more physically active and wants to naturally lower his heart rate.  This is reasonable  - will send in new rx for propranolol 60 mg daily.     Follow in 2 mo      23 - virtual visit  Follow after start of adderall 20 mg xr + adderall 20 mg short acting    Short acting working well, but did not feel the 20 mg XR was very effective as it made him feel like a fog but not as effective.  Will start him on 30 mg XR adderall, continue short acting 20 mg adderall dosing  Follow in 4 weeks.       23 - virtual visit  Follow up on starting adderall 20 mg BID.   Feels like the 20 mg short acting is wearing off within 4 hours, and this is not giving him enough time to be productive during the daytime.  Did try taking 1/2 tab more of his current pills (admittedly) and this was beneficial but still did not help with longevity of productiveness    -- will start adderall xr 20 mg, and also daily adderall short acting 20 mg.    - RTO 3 weeks.  Printed rx      23 -- virtual visit    Started the adderall 20 mg in the pm.

## 2024-08-12 NOTE — TELEPHONE ENCOUNTER
Medication:   Requested Prescriptions     Pending Prescriptions Disp Refills    amphetamine-dextroamphetamine (ADDERALL) 30 MG tablet 60 tablet 0     Sig: Take 1 tablet by mouth 2 times daily for 30 days. Max Daily Amount: 60 mg    amphetamine-dextroamphetamine (ADDERALL) 30 MG tablet 60 tablet 0     Sig: Take 1 tablet by mouth 2 times daily for 30 days. Max Daily Amount: 60 mg    amphetamine-dextroamphetamine (ADDERALL) 30 MG tablet 60 tablet 0     Sig: Take 1 tablet by mouth 2 times daily for 30 days. Max Daily Amount: 60 mg        Last Filled:  [unfilled]    Patient Phone Number: [phone] (home)     Last appt: 5/15/2024   Next appt: 8/12/2024    Last OARRS:       8/7/2023     8:46 AM   RX Monitoring   Periodic Controlled Substance Monitoring Possible medication side effects, risk of tolerance/dependence & alternative treatments discussed.;No signs of potential drug abuse or diversion identified.

## 2024-08-12 NOTE — ASSESSMENT & PLAN NOTE
Borderline controlled, changes made today: change to adderall 20 mg in the morning, and adderall 10 mg in the evening

## 2024-08-13 ENCOUNTER — TELEPHONE (OUTPATIENT)
Dept: PRIMARY CARE CLINIC | Age: 29
End: 2024-08-13

## 2024-08-13 NOTE — TELEPHONE ENCOUNTER
Thinks his blood pressure might be getting too low and he needs to cut back on his medication . Forget to tell you this yesterday . He is getting a little light headed

## 2024-08-14 NOTE — TELEPHONE ENCOUNTER
Called and informed patient he expressed understanding  informed to give an update on how these changes have been going states he will give an update

## 2024-08-21 ENCOUNTER — TELEPHONE (OUTPATIENT)
Dept: PRIMARY CARE CLINIC | Age: 29
End: 2024-08-21

## 2024-08-21 DIAGNOSIS — R00.2 INTERMITTENT PALPITATIONS: Chronic | ICD-10-CM

## 2024-08-21 NOTE — TELEPHONE ENCOUNTER
Patient called stating his have side effects from   Cramps, vomiting, and diarrhea, constipation over the past 2 weeks   Pt did take medication today and he feels better    propranolol (INDERAL LA) 80 MG extended release capsule   Please send message on my chart pt having phone issues

## 2024-08-21 NOTE — TELEPHONE ENCOUNTER
Called patient @ [phone] to schedule appointment & cell with [employer] stated this number is no longer in service

## 2024-08-22 RX ORDER — PROPRANOLOL HCL 60 MG
60 CAPSULE, EXTENDED RELEASE 24HR ORAL DAILY
Qty: 90 CAPSULE | Refills: 0 | OUTPATIENT
Start: 2024-08-22

## 2024-08-29 ENCOUNTER — TELEMEDICINE (OUTPATIENT)
Dept: PRIMARY CARE CLINIC | Age: 29
End: 2024-08-29
Payer: COMMERCIAL

## 2024-08-29 DIAGNOSIS — K59.01 CONSTIPATION BY DELAYED COLONIC TRANSIT: ICD-10-CM

## 2024-08-29 DIAGNOSIS — R42 EPISODIC LIGHTHEADEDNESS: Primary | ICD-10-CM

## 2024-08-29 DIAGNOSIS — J30.2 SEASONAL ALLERGIC RHINITIS, UNSPECIFIED TRIGGER: ICD-10-CM

## 2024-08-29 PROCEDURE — 99214 OFFICE O/P EST MOD 30 MIN: CPT | Performed by: FAMILY MEDICINE

## 2024-08-29 PROCEDURE — G8427 DOCREV CUR MEDS BY ELIG CLIN: HCPCS | Performed by: FAMILY MEDICINE

## 2024-08-29 RX ORDER — PROPRANOLOL HYDROCHLORIDE 40 MG/1
40 TABLET ORAL DAILY
Qty: 90 TABLET | Refills: 0 | Status: SHIPPED | OUTPATIENT
Start: 2024-08-29

## 2024-08-29 RX ORDER — CETIRIZINE HYDROCHLORIDE 10 MG/1
10 TABLET ORAL DAILY
Qty: 90 TABLET | Refills: 0 | Status: SHIPPED | OUTPATIENT
Start: 2024-08-29

## 2024-08-29 NOTE — PROGRESS NOTES
Luke Harrison, was evaluated through a synchronous (real-time) audio-video encounter. The patient (or guardian if applicable) is aware that this is a billable service, which includes applicable co-pays. This Virtual Visit was conducted with patient's (and/or legal guardian's) consent. Patient identification was verified, and a caregiver was present when appropriate.   The patient was located at Home: [address] [place] [address] [address].  [address]  [facility] [address]  Provider was located at Facility (Appt Dept): [address] [place], [place] [id number]  Confirm you are appropriately licensed, registered, or certified to deliver care in the state where the patient is located as indicated above. If you are not or unsure, please re-schedule the visit: Yes, I confirm.     Luke Harrison (:  1995) is a Established patient, presenting virtually for evaluation of the following:      Below is the assessment and plan developed based on review of pertinent history, physical exam, labs, studies, and medications.     Assessment & Plan  Episodic lightheadedness   Borderline controlled, start zyrtec    Orders:    propranolol (INDERAL) 40 MG tablet; Take 1 tablet by mouth daily    Seasonal allergic rhinitis, unspecified trigger   Uncontrolled, begin zyrtec    Orders:    cetirizine (ZYRTEC) 10 MG tablet; Take 1 tablet by mouth daily    Constipation by delayed colonic transit   Uncontrolled, begin miralax daily           No follow-ups on file.       Subjective   24 - virtual  Having some mucus drainage from his nose, lightheaded if doing outside work    Constipation  -- try miralax daily    Sinus pressure/seasonal allergies:  - start zyrtec    Lightheadedness:  - happens at the daytime when he is outside doing yard work  - will drop propranolol dose to 40 mg daily, stop the 80  mg  - start zyrtec      24:  Has been very busy with his family, moved back in with them    Wants to go back down to 20 mg adderall, then 10 mg dose  ---  past with good results      Heartburn (Has been going on for a long time but has been bothering him lately) and Hypertension  -- continue omeprazole    Prediabetes -- no meds        Review of Systems       Objective   Patient-Reported Vitals  No data recorded     Physical Exam  [INSTRUCTIONS:  \"[x]\" Indicates a positive item  \"[]\" Indicates a negative item  -- DELETE ALL ITEMS NOT EXAMINED]    Constitutional: [x] Appears well-developed and well-nourished [x] No apparent distress      [] Abnormal -     Mental status: [x] Alert and awake  [x] Oriented to person/place/time [x] Able to follow commands    [] Abnormal -     Eyes:   EOM    [x]  Normal    [] Abnormal -   Sclera  [x]  Normal    [] Abnormal -          Discharge [x]  None visible   [] Abnormal -     HENT: [x] Normocephalic, atraumatic  [] Abnormal -   [x] Mouth/Throat: Mucous membranes are moist    External Ears [x] Normal  [] Abnormal -    Neck: [x] No visualized mass [] Abnormal -     Pulmonary/Chest: [x] Respiratory effort normal   [x] No visualized signs of difficulty breathing or respiratory distress        [] Abnormal -      Musculoskeletal:   [x] Normal gait with no signs of ataxia         [x] Normal range of motion of neck        [] Abnormal -     Neurological:        [x] No Facial Asymmetry (Cranial nerve 7 motor function) (limited exam due to video visit)          [x] No gaze palsy        [] Abnormal -          Skin:        [x] No significant exanthematous lesions or discoloration noted on facial skin         [] Abnormal -            Psychiatric:       [x] Normal Affect [] Abnormal -        [x] No Hallucinations    Other pertinent observable physical exam findings:-             --Jennifer Shetty, DO

## 2024-09-07 DIAGNOSIS — F41.0 ANXIETY ATTACK: Chronic | ICD-10-CM

## 2024-09-09 ENCOUNTER — TELEPHONE (OUTPATIENT)
Dept: PRIMARY CARE CLINIC | Age: 29
End: 2024-09-09

## 2024-09-09 DIAGNOSIS — F90.2 ADHD (ATTENTION DEFICIT HYPERACTIVITY DISORDER), COMBINED TYPE: ICD-10-CM

## 2024-09-11 DIAGNOSIS — F41.0 ANXIETY ATTACK: ICD-10-CM

## 2024-09-11 DIAGNOSIS — R42 EPISODIC LIGHTHEADEDNESS: ICD-10-CM

## 2024-09-11 DIAGNOSIS — R42 EPISODIC LIGHTHEADEDNESS: Primary | ICD-10-CM

## 2024-09-11 RX ORDER — BUSPIRONE HYDROCHLORIDE 5 MG/1
5 TABLET ORAL 3 TIMES DAILY
Qty: 270 TABLET | Refills: 1 | Status: SHIPPED | OUTPATIENT
Start: 2024-09-11

## 2024-09-11 RX ORDER — PROPRANOLOL HYDROCHLORIDE 60 MG/1
60 TABLET ORAL DAILY
Qty: 90 TABLET | Refills: 0 | Status: SHIPPED | OUTPATIENT
Start: 2024-09-11 | End: 2024-09-11 | Stop reason: ALTCHOICE

## 2024-09-11 RX ORDER — DEXTROAMPHETAMINE SACCHARATE, AMPHETAMINE ASPARTATE, DEXTROAMPHETAMINE SULFATE AND AMPHETAMINE SULFATE 2.5; 2.5; 2.5; 2.5 MG/1; MG/1; MG/1; MG/1
10 TABLET ORAL DAILY
Qty: 30 TABLET | Refills: 0 | Status: SHIPPED | OUTPATIENT
Start: 2024-09-11 | End: 2024-10-11

## 2024-09-11 RX ORDER — PROPRANOLOL HCL 60 MG
60 CAPSULE, EXTENDED RELEASE 24HR ORAL DAILY
Qty: 30 CAPSULE | Refills: 3 | Status: SHIPPED | OUTPATIENT
Start: 2024-09-11

## 2024-09-11 RX ORDER — DEXTROAMPHETAMINE SACCHARATE, AMPHETAMINE ASPARTATE, DEXTROAMPHETAMINE SULFATE AND AMPHETAMINE SULFATE 5; 5; 5; 5 MG/1; MG/1; MG/1; MG/1
20 TABLET ORAL DAILY
Qty: 30 TABLET | Refills: 0 | Status: SHIPPED | OUTPATIENT
Start: 2024-09-11 | End: 2024-10-11

## 2024-09-30 ENCOUNTER — E-VISIT (OUTPATIENT)
Dept: PRIMARY CARE CLINIC | Age: 29
End: 2024-09-30
Payer: COMMERCIAL

## 2024-09-30 DIAGNOSIS — F90.2 ADHD (ATTENTION DEFICIT HYPERACTIVITY DISORDER), COMBINED TYPE: Primary | Chronic | ICD-10-CM

## 2024-09-30 DIAGNOSIS — J30.2 SEASONAL ALLERGIC RHINITIS, UNSPECIFIED TRIGGER: ICD-10-CM

## 2024-09-30 PROCEDURE — 99422 OL DIG E/M SVC 11-20 MIN: CPT | Performed by: FAMILY MEDICINE

## 2024-09-30 RX ORDER — DEXTROAMPHETAMINE SACCHARATE, AMPHETAMINE ASPARTATE, DEXTROAMPHETAMINE SULFATE AND AMPHETAMINE SULFATE 7.5; 7.5; 7.5; 7.5 MG/1; MG/1; MG/1; MG/1
30 TABLET ORAL 2 TIMES DAILY
Qty: 32 TABLET | Refills: 0 | Status: SHIPPED | OUTPATIENT
Start: 2024-10-14 | End: 2024-10-30

## 2024-09-30 RX ORDER — DEXTROAMPHETAMINE SACCHARATE, AMPHETAMINE ASPARTATE, DEXTROAMPHETAMINE SULFATE AND AMPHETAMINE SULFATE 5; 5; 5; 5 MG/1; MG/1; MG/1; MG/1
20 TABLET ORAL 2 TIMES DAILY
Qty: 28 TABLET | Refills: 0 | Status: SHIPPED | OUTPATIENT
Start: 2024-09-30 | End: 2024-10-14

## 2024-09-30 NOTE — PROGRESS NOTES
Luke Harrison (1995) initiated an asynchronous digital communication through [employer].    HPI: per patient questionnaire     Exam: not applicable    Diagnoses and all orders for this visit:  Diagnoses and all orders for this visit:    ADHD (attention deficit hyperactivity disorder), combined type  Comments:  not improving -- will return to 20 mg BID for 2 weeks, then adjust to 30 mg BID if tolerating again.  Orders:  -     amphetamine-dextroamphetamine (ADDERALL, 20MG,) 20 MG tablet; Take 1 tablet by mouth 2 times daily for 14 days. Max Daily Amount: 40 mg  -     amphetamine-dextroamphetamine (ADDERALL, 30MG,) 30 MG tablet; Take 1 tablet by mouth 2 times daily for 16 days. Max Daily Amount: 60 mg          Time: EV2 - 11-20 minutes were spent on the digital evaluation and management of this patient.     Jennifer Shetty, DO

## 2024-10-01 RX ORDER — CETIRIZINE HYDROCHLORIDE 10 MG/1
10 TABLET ORAL DAILY
Qty: 90 TABLET | Refills: 0 | OUTPATIENT
Start: 2024-10-01

## 2024-10-18 ENCOUNTER — TELEMEDICINE (OUTPATIENT)
Dept: PRIMARY CARE CLINIC | Age: 29
End: 2024-10-18

## 2024-10-18 DIAGNOSIS — R05.8 DRY COUGH: Primary | ICD-10-CM

## 2024-10-18 DIAGNOSIS — F90.2 ADHD (ATTENTION DEFICIT HYPERACTIVITY DISORDER), COMBINED TYPE: Chronic | ICD-10-CM

## 2024-10-18 DIAGNOSIS — T50.905A MEDICATION SIDE EFFECTS PRESENT, INITIAL ENCOUNTER: ICD-10-CM

## 2024-10-18 RX ORDER — DEXTROAMPHETAMINE SACCHARATE, AMPHETAMINE ASPARTATE, DEXTROAMPHETAMINE SULFATE AND AMPHETAMINE SULFATE 7.5; 7.5; 7.5; 7.5 MG/1; MG/1; MG/1; MG/1
30 TABLET ORAL 2 TIMES DAILY
Qty: 60 TABLET | Refills: 0 | Status: SHIPPED | OUTPATIENT
Start: 2024-10-18 | End: 2024-11-17

## 2024-10-18 NOTE — PROGRESS NOTES
Luke Harrison, was evaluated through a synchronous (real-time) audio-video encounter. The patient (or guardian if applicable) is aware that this is a billable service, which includes applicable co-pays. This Virtual Visit was conducted with patient's (and/or legal guardian's) consent. Patient identification was verified, and a caregiver was present when appropriate.   The patient was located at Home: [address] [place] [address]  Provider was located at Facility (Appt Dept): [address] [place] [id number]  Confirm you are appropriately licensed, registered, or certified to deliver care in the state where the patient is located as indicated above. If you are not or unsure, please re-schedule the visit: Yes, I confirm.     Luke Harrison (:  1995) is a Established patient, presenting virtually for evaluation of the following:      Below is the assessment and plan developed based on review of pertinent history, physical exam, labs, studies, and medications.     Assessment & Plan  ADHD (attention deficit hyperactivity disorder), combined type   Chronic, at goal (stable), continue current treatment plan    Orders:    amphetamine-dextroamphetamine (ADDERALL, 30MG,) 30 MG tablet; Take 1 tablet by mouth 2 times daily for 30 days. Max Daily Amount: 60 mg    Dry cough   Chronic, not at goal (unstable), changes made today: stop lisinopril, follow in 2 weeks to eval for improvement         Medication side effects present, initial encounter   Chronic, not at goal (unstable), stop lisinopril to see if dry cough and cold kneecaps improve.             Return in about 2 weeks (around 2024) for med follow up, recheck.       Subjective   10.18.24:    Lisinopril -- causing dry cough, weird s/e like cold kneecaps.  Takes it in the morning, and then feels the worst at 4pm when med is at its full effect.  BP today while on virtual was 125/82, so will trial off lisinopril without starting something to

## 2024-10-18 NOTE — ASSESSMENT & PLAN NOTE
Chronic, at goal (stable), continue current treatment plan    Orders:    amphetamine-dextroamphetamine (ADDERALL, 30MG,) 30 MG tablet; Take 1 tablet by mouth 2 times daily for 30 days. Max Daily Amount: 60 mg

## 2024-10-21 ENCOUNTER — PATIENT MESSAGE (OUTPATIENT)
Dept: PRIMARY CARE CLINIC | Age: 29
End: 2024-10-21

## 2024-10-31 ENCOUNTER — TELEMEDICINE (OUTPATIENT)
Dept: PRIMARY CARE CLINIC | Age: 29
End: 2024-10-31
Payer: COMMERCIAL

## 2024-10-31 DIAGNOSIS — F90.2 ADHD (ATTENTION DEFICIT HYPERACTIVITY DISORDER), COMBINED TYPE: ICD-10-CM

## 2024-10-31 DIAGNOSIS — F41.0 ANXIETY ATTACK: ICD-10-CM

## 2024-10-31 DIAGNOSIS — F17.298 OTHER TOBACCO PRODUCT NICOTINE DEPENDENCE WITH OTHER NICOTINE-INDUCED DISORDER: Primary | ICD-10-CM

## 2024-10-31 PROBLEM — F17.200 NICOTINE DEPENDENCE: Status: ACTIVE | Noted: 2024-10-31

## 2024-10-31 PROCEDURE — 1036F TOBACCO NON-USER: CPT | Performed by: FAMILY MEDICINE

## 2024-10-31 PROCEDURE — G8427 DOCREV CUR MEDS BY ELIG CLIN: HCPCS | Performed by: FAMILY MEDICINE

## 2024-10-31 PROCEDURE — G8419 CALC BMI OUT NRM PARAM NOF/U: HCPCS | Performed by: FAMILY MEDICINE

## 2024-10-31 PROCEDURE — G8484 FLU IMMUNIZE NO ADMIN: HCPCS | Performed by: FAMILY MEDICINE

## 2024-10-31 PROCEDURE — 99214 OFFICE O/P EST MOD 30 MIN: CPT | Performed by: FAMILY MEDICINE

## 2024-10-31 RX ORDER — NICOTINE 21 MG/24HR
1 PATCH, TRANSDERMAL 24 HOURS TRANSDERMAL DAILY
Qty: 42 PATCH | Refills: 0 | Status: SHIPPED | OUTPATIENT
Start: 2024-10-31 | End: 2024-12-12

## 2024-10-31 RX ORDER — DEXTROAMPHETAMINE SACCHARATE, AMPHETAMINE ASPARTATE, DEXTROAMPHETAMINE SULFATE AND AMPHETAMINE SULFATE 7.5; 7.5; 7.5; 7.5 MG/1; MG/1; MG/1; MG/1
30 TABLET ORAL 2 TIMES DAILY
Qty: 60 TABLET | Refills: 0 | Status: SHIPPED | OUTPATIENT
Start: 2024-11-30 | End: 2024-12-30

## 2024-10-31 RX ORDER — DEXTROAMPHETAMINE SACCHARATE, AMPHETAMINE ASPARTATE, DEXTROAMPHETAMINE SULFATE AND AMPHETAMINE SULFATE 7.5; 7.5; 7.5; 7.5 MG/1; MG/1; MG/1; MG/1
30 TABLET ORAL 2 TIMES DAILY
Qty: 60 TABLET | Refills: 0 | Status: SHIPPED | OUTPATIENT
Start: 2024-10-31 | End: 2024-11-30

## 2024-10-31 NOTE — ASSESSMENT & PLAN NOTE
Chronic, at goal (stable), continue current treatment plan    Orders:    amphetamine-dextroamphetamine (ADDERALL) 30 MG tablet; Take 1 tablet by mouth 2 times daily for 30 days. Max Daily Amount: 60 mg    amphetamine-dextroamphetamine (ADDERALL) 30 MG tablet; Take 1 tablet by mouth 2 times daily for 30 days. Max Daily Amount: 60 mg

## 2024-10-31 NOTE — PROGRESS NOTES
[] Abnormal -     Mental status: [x] Alert and awake  [x] Oriented to person/place/time [x] Able to follow commands    [] Abnormal -     Eyes:   EOM    [x]  Normal    [] Abnormal -   Sclera  [x]  Normal    [] Abnormal -          Discharge [x]  None visible   [] Abnormal -     HENT: [x] Normocephalic, atraumatic  [] Abnormal -   [x] Mouth/Throat: Mucous membranes are moist    External Ears [x] Normal  [] Abnormal -    Neck: [x] No visualized mass [] Abnormal -     Pulmonary/Chest: [x] Respiratory effort normal   [x] No visualized signs of difficulty breathing or respiratory distress        [] Abnormal -      Musculoskeletal:   [x] Normal gait with no signs of ataxia         [x] Normal range of motion of neck        [] Abnormal -     Neurological:        [x] No Facial Asymmetry (Cranial nerve 7 motor function) (limited exam due to video visit)          [x] No gaze palsy        [] Abnormal -          Skin:        [x] No significant exanthematous lesions or discoloration noted on facial skin         [] Abnormal -            Psychiatric:       [x] Normal Affect [] Abnormal -        [x] No Hallucinations    Other pertinent observable physical exam findings:-             --Jennifer Shetty, DO

## 2024-11-03 ENCOUNTER — TELEMEDICINE ON DEMAND (OUTPATIENT)
Age: 29
End: 2024-11-03
Payer: COMMERCIAL

## 2024-11-03 DIAGNOSIS — F41.1 GAD (GENERALIZED ANXIETY DISORDER): Primary | ICD-10-CM

## 2024-11-03 PROCEDURE — 1036F TOBACCO NON-USER: CPT | Performed by: NURSE PRACTITIONER

## 2024-11-03 PROCEDURE — G8419 CALC BMI OUT NRM PARAM NOF/U: HCPCS | Performed by: NURSE PRACTITIONER

## 2024-11-03 PROCEDURE — 99213 OFFICE O/P EST LOW 20 MIN: CPT | Performed by: NURSE PRACTITIONER

## 2024-11-03 PROCEDURE — G8484 FLU IMMUNIZE NO ADMIN: HCPCS | Performed by: NURSE PRACTITIONER

## 2024-11-03 PROCEDURE — G8427 DOCREV CUR MEDS BY ELIG CLIN: HCPCS | Performed by: NURSE PRACTITIONER

## 2024-11-03 RX ORDER — PROPRANOLOL HYDROCHLORIDE 10 MG/1
20 TABLET ORAL DAILY
Qty: 30 TABLET | Refills: 1 | Status: SHIPPED | OUTPATIENT
Start: 2024-11-03

## 2024-11-05 RX ORDER — NICOTINE 21 MG/24HR
1 PATCH, TRANSDERMAL 24 HOURS TRANSDERMAL DAILY
Qty: 42 PATCH | Refills: 0 | Status: CANCELLED | OUTPATIENT
Start: 2024-11-05 | End: 2024-12-17

## 2024-11-06 ENCOUNTER — TELEMEDICINE (OUTPATIENT)
Dept: PRIMARY CARE CLINIC | Age: 29
End: 2024-11-06
Payer: COMMERCIAL

## 2024-11-06 DIAGNOSIS — F17.298 OTHER TOBACCO PRODUCT NICOTINE DEPENDENCE WITH OTHER NICOTINE-INDUCED DISORDER: Primary | ICD-10-CM

## 2024-11-06 DIAGNOSIS — F90.2 ADHD (ATTENTION DEFICIT HYPERACTIVITY DISORDER), COMBINED TYPE: ICD-10-CM

## 2024-11-06 DIAGNOSIS — R00.2 INTERMITTENT PALPITATIONS: ICD-10-CM

## 2024-11-06 PROCEDURE — G8427 DOCREV CUR MEDS BY ELIG CLIN: HCPCS | Performed by: FAMILY MEDICINE

## 2024-11-06 PROCEDURE — 99214 OFFICE O/P EST MOD 30 MIN: CPT | Performed by: FAMILY MEDICINE

## 2024-11-06 RX ORDER — NICOTINE 21 MG/24HR
1 PATCH, TRANSDERMAL 24 HOURS TRANSDERMAL DAILY
Qty: 14 PATCH | Refills: 5 | Status: SHIPPED | OUTPATIENT
Start: 2024-11-06 | End: 2025-01-29

## 2024-11-06 NOTE — PROGRESS NOTES
Luke Harrison, was evaluated through a synchronous (real-time) audio-video encounter. The patient (or guardian if applicable) is aware that this is a billable service, which includes applicable co-pays. This Virtual Visit was conducted with patient's (and/or legal guardian's) consent. Patient identification was verified, and a caregiver was present when appropriate.   The patient was located at Home: [address] [place] [address]  Provider was located at Facility (Appt Dept): [address] [place] [id number]  Confirm you are appropriately licensed, registered, or certified to deliver care in the state where the patient is located as indicated above. If you are not or unsure, please re-schedule the visit: Yes, I confirm.     Luke Harrison (:  1995) is a Established patient, presenting virtually for evaluation of the following:      Below is the assessment and plan developed based on review of pertinent history, physical exam, labs, studies, and medications.     Assessment & Plan  Other tobacco product nicotine dependence with other nicotine-induced disorder       Orders:    nicotine (NICODERM CQ) 14 MG/24HR; Place 1 patch onto the skin daily    Intermittent palpitations            ADHD (attention deficit hyperactivity disorder), combined type              No follow-ups on file.       Subjective   1124: virtual visit    Follow on propranolol dosing  - pt very interested in cutting down this dose again  - will allow this to occur on patient's timeline  - he will self titrate down to 10 mg daily, and then eventually off it completely if he is tolerating it    Tobacco use:  - is vaping, wanting to stop.    - had tried the 21 mg patch but it was too much  - will send in 14 mg dose daily    Adhd:  - meds working well so far  - cont adderall 30 BID    Happier now, has good relationship, thinking of proposing to the woman he is with      10.31.24: virtual visit     Dry cough:  - improved

## 2024-11-21 ENCOUNTER — TELEMEDICINE (OUTPATIENT)
Dept: PRIMARY CARE CLINIC | Age: 29
End: 2024-11-21
Payer: COMMERCIAL

## 2024-11-21 DIAGNOSIS — F17.298 OTHER TOBACCO PRODUCT NICOTINE DEPENDENCE WITH OTHER NICOTINE-INDUCED DISORDER: Primary | ICD-10-CM

## 2024-11-21 DIAGNOSIS — F90.2 ADHD (ATTENTION DEFICIT HYPERACTIVITY DISORDER), COMBINED TYPE: ICD-10-CM

## 2024-11-21 PROCEDURE — 99214 OFFICE O/P EST MOD 30 MIN: CPT | Performed by: FAMILY MEDICINE

## 2024-11-21 PROCEDURE — G8427 DOCREV CUR MEDS BY ELIG CLIN: HCPCS | Performed by: FAMILY MEDICINE

## 2024-11-21 RX ORDER — DEXTROAMPHETAMINE SACCHARATE, AMPHETAMINE ASPARTATE, DEXTROAMPHETAMINE SULFATE AND AMPHETAMINE SULFATE 7.5; 7.5; 7.5; 7.5 MG/1; MG/1; MG/1; MG/1
TABLET ORAL
Qty: 45 TABLET | Refills: 0 | Status: SHIPPED | OUTPATIENT
Start: 2024-11-30 | End: 2024-12-30

## 2024-11-21 NOTE — ASSESSMENT & PLAN NOTE
Chronic, not at goal (unstable), continue weaning down to 7 mg.  14 mg dose was too much    Orders:    nicotine (NICODERM CQ) 7 MG/24HR; Place 1 patch onto the skin daily for 28 days

## 2024-11-21 NOTE — PROGRESS NOTES
30 mg dose in the afternoons, but does in the morning  - can adjust dosing to adderall 30 mg am, and adderall 15 mg pm.       11.6.24: virtual visit    Follow on propranolol dosing  - pt very interested in cutting down this dose again  - will allow this to occur on patient's timeline  - he will self titrate down to 10 mg daily, and then eventually off it completely if he is tolerating it    Tobacco use:  - is vaping, wanting to stop.    - had tried the 21 mg patch but it was too much  - will send in 14 mg dose daily    Adhd:  - meds working well so far  - cont adderall 30 BID    Happier now, has good relationship, thinking of proposing to the woman he is with      10.31.24: virtual visit     Dry cough:  - improved after d/c of lisinopril  - Bp today 125/83 after only taking propranolol and adderall    Buspar -- wants to get off this. Feels more anxiety at times when he takes it.  Currently only using once daily    In therapy now.  New job is better for him, gave him good benefits and is doing free month of [employer] virtual therapy.     Propranolol: wants to get off this as well, discussed changing to every other day and seeing how he tolerates the change in his heart rate and any anxiety    Nicotine:  - wants to stop, using vape pen all day long  - had stopped in the past, but gum was not helpful  - will start nicotine patches, follow in 2 weeks        10.18.24:    Lisinopril -- causing dry cough, weird s/e like cold kneecaps.  Takes it in the morning, and then feels the worst at 4pm when med is at its full effect.  BP today while on virtual was 125/82, so will trial off lisinopril without starting something to replace it.     Adhd:  - continue 30 mg BID dosing.     Interim between last visit and this visit:  Adhd was not improving -- will return to 20 mg BID for 2 weeks, then adjust to 30 mg BID if tolerating again      8.29.24 - virtual  Having some mucus drainage from his nose, lightheaded if doing outside

## 2024-11-21 NOTE — ASSESSMENT & PLAN NOTE
Chronic, not at goal (unstable), wean down from 30 BID to 30 q am and 15 q pm    Orders:    amphetamine-dextroamphetamine (ADDERALL) 30 MG tablet; Take 1 tablet by mouth every morning AND 0.5 tablets every evening. Do all this for 30 days. Max Daily Amount: 45 mg.

## 2024-12-06 ENCOUNTER — OFFICE VISIT (OUTPATIENT)
Dept: PRIMARY CARE CLINIC | Age: 29
End: 2024-12-06
Payer: COMMERCIAL

## 2024-12-06 VITALS
SYSTOLIC BLOOD PRESSURE: 136 MMHG | OXYGEN SATURATION: 100 % | HEIGHT: 67 IN | HEART RATE: 98 BPM | WEIGHT: 157.6 LBS | TEMPERATURE: 98.6 F | BODY MASS INDEX: 24.74 KG/M2 | DIASTOLIC BLOOD PRESSURE: 80 MMHG

## 2024-12-06 DIAGNOSIS — F90.2 ADHD (ATTENTION DEFICIT HYPERACTIVITY DISORDER), COMBINED TYPE: ICD-10-CM

## 2024-12-06 DIAGNOSIS — F41.0 ANXIETY ATTACK: Chronic | ICD-10-CM

## 2024-12-06 DIAGNOSIS — F17.298 OTHER TOBACCO PRODUCT NICOTINE DEPENDENCE WITH OTHER NICOTINE-INDUCED DISORDER: Primary | ICD-10-CM

## 2024-12-06 PROBLEM — F17.200 NICOTINE DEPENDENCE: Chronic | Status: ACTIVE | Noted: 2024-10-31

## 2024-12-06 PROCEDURE — 3075F SYST BP GE 130 - 139MM HG: CPT | Performed by: FAMILY MEDICINE

## 2024-12-06 PROCEDURE — G8427 DOCREV CUR MEDS BY ELIG CLIN: HCPCS | Performed by: FAMILY MEDICINE

## 2024-12-06 PROCEDURE — G8420 CALC BMI NORM PARAMETERS: HCPCS | Performed by: FAMILY MEDICINE

## 2024-12-06 PROCEDURE — 3079F DIAST BP 80-89 MM HG: CPT | Performed by: FAMILY MEDICINE

## 2024-12-06 PROCEDURE — G8484 FLU IMMUNIZE NO ADMIN: HCPCS | Performed by: FAMILY MEDICINE

## 2024-12-06 PROCEDURE — 99214 OFFICE O/P EST MOD 30 MIN: CPT | Performed by: FAMILY MEDICINE

## 2024-12-06 PROCEDURE — 1036F TOBACCO NON-USER: CPT | Performed by: FAMILY MEDICINE

## 2024-12-06 RX ORDER — DEXTROAMPHETAMINE SACCHARATE, AMPHETAMINE ASPARTATE, DEXTROAMPHETAMINE SULFATE AND AMPHETAMINE SULFATE 2.5; 2.5; 2.5; 2.5 MG/1; MG/1; MG/1; MG/1
10 TABLET ORAL DAILY
Qty: 30 TABLET | Refills: 0 | Status: SHIPPED | OUTPATIENT
Start: 2024-12-06 | End: 2025-01-05

## 2024-12-06 NOTE — ASSESSMENT & PLAN NOTE
Chronic, not at goal (unstable), tried weaning himself off completely which was not productive. Will start on adderall 10 mg prn

## 2024-12-06 NOTE — ASSESSMENT & PLAN NOTE
Chronic, not at goal (unstable), nicotine patch was not effective.  Will try gum for 'quick hit' help

## 2024-12-06 NOTE — PROGRESS NOTES
Date    HDL 62 (H) 05/10/2021     No components found for: \"LDLCHOLESTEROL\", \"LDLCALC\"  Lab Results   Component Value Date    VLDL 20 05/10/2021     No results found for: \"CHOLHDLRATIO\"     The ASCVD Risk score (Remigio VEANS, et al., 2019) failed to calculate for the following reasons:    The 2019 ASCVD risk score is only valid for ages 40 to 79     Patient received counseling and, if relevant, printed instructions for all symptoms listed in CC and HPI, as well as for all diagnoses brought onto today's visit note below. Typical counseling includes, but is not limited to, non-pharmacologic measures to manage listed symptoms and conditions; appropriate use, risks and benefits for all prescribed medications; potential interactions between medications both prescribed and OTC; diet; exercise; healthy behaviors; and goalsetting to improve health. Patient or responsible party was involved in shared decision making and had opportunity to have all questions answered.  Except as noted below, all chronic problems have been reviewed and are stable to continue medications or other therapy as previously documented in the patient's chart, with changes per orders or documentation below:    1. Other tobacco product nicotine dependence with other nicotine-induced disorder  Assessment & Plan:   Chronic, not at goal (unstable), nicotine patch was not effective.  Will try gum for 'quick hit' help  Orders:  -     nicotine polacrilex (NICORETTE) 2 MG gum; Take 1 each by mouth as needed for Smoking cessation, Disp-110 each, R-3Print  2. ADHD (attention deficit hyperactivity disorder), combined type  Assessment & Plan:   Chronic, not at goal (unstable), tried weaning himself off completely which was not productive. Will start on adderall 10 mg prn  Orders:  -     amphetamine-dextroamphetamine (ADDERALL, 10MG,) 10 MG tablet; Take 1 tablet by mouth daily for 30 days. Max Daily Amount: 10 mg, Disp-30 tablet, R-0Print  3. Anxiety

## 2025-01-03 ENCOUNTER — TELEMEDICINE ON DEMAND (OUTPATIENT)
Age: 30
End: 2025-01-03

## 2025-01-03 DIAGNOSIS — K04.7 DENTAL INFECTION: ICD-10-CM

## 2025-01-03 DIAGNOSIS — J30.2 SEASONAL ALLERGIC RHINITIS, UNSPECIFIED TRIGGER: Primary | ICD-10-CM

## 2025-01-03 RX ORDER — PENICILLIN V POTASSIUM 500 MG/1
500 TABLET, FILM COATED ORAL 3 TIMES DAILY
Qty: 30 TABLET | Refills: 0 | Status: SHIPPED | OUTPATIENT
Start: 2025-01-03 | End: 2025-01-03

## 2025-01-03 RX ORDER — PENICILLIN V POTASSIUM 500 MG/1
500 TABLET, FILM COATED ORAL 4 TIMES DAILY
Qty: 28 TABLET | Refills: 0 | Status: SHIPPED | OUTPATIENT
Start: 2025-01-03 | End: 2025-01-10

## 2025-01-03 RX ORDER — LORATADINE 10 MG/1
10 TABLET ORAL DAILY
Qty: 30 TABLET | Refills: 0 | Status: SHIPPED | OUTPATIENT
Start: 2025-01-03 | End: 2025-02-02

## 2025-01-03 ASSESSMENT — ENCOUNTER SYMPTOMS: SINUS PRESSURE: 1

## 2025-01-03 NOTE — PROGRESS NOTES
Luke Harrison (:  1995) is a Established patient, here for evaluation of the following:    Dental Pain (Lower L molar has a hole in it, some localized swelling )       Assessment & Plan:  Below is the assessment and plan developed based on review of pertinent history, physical exam, labs, studies, and medications.  1. Seasonal allergic rhinitis, unspecified trigger  -     loratadine (CLARITIN) 10 MG tablet; Take 1 tablet by mouth daily, Disp-30 tablet, R-0Normal  2. Dental infection  -     penicillin v potassium (VEETID) 500 MG tablet; Take 1 tablet by mouth 4 times daily for 7 days, Disp-28 tablet, R-0Normal  Continue with gargling, f/u with dentist and pcp as planned.   The patient would benefit from future follow up with their usual PCP. As of the end of their Virtualist Visit today, follow up visit status is as follows: Visit previously scheduled    Return in about 3 days (around 2025).    Subjective:   Pain in left lower molar increased in the past week. Using hydrogen peroxide and alcohol gargling. Taking loratadine. Feels like that has helped his symptoms. Feels like he has infection in his tooth. Also has sinus congestion and feels like this may be connected. Has a hole in his tooth that has been present for a year. He has had to take antibiotics for it in the past. Face has been swollen off and on.     Dental Pain   This is a recurrent problem. The current episode started more than 1 year ago. The problem occurs constantly. The problem has been waxing and waning. The pain is at a severity of 6/10. The pain is moderate. Associated symptoms include facial pain, sinus pressure and thermal sensitivity. He has tried acetaminophen and NSAIDs for the symptoms. The treatment provided no relief.     Review of Systems   HENT:  Positive for sinus pressure.        Objective:  Patient-Reported Vitals  No data recorded         2024     3:57 PM   Patient-Reported Vitals   Patient-Reported Weight 160

## 2025-01-06 ENCOUNTER — TELEMEDICINE (OUTPATIENT)
Dept: PRIMARY CARE CLINIC | Age: 30
End: 2025-01-06
Payer: COMMERCIAL

## 2025-01-06 DIAGNOSIS — J30.2 SEASONAL ALLERGIC RHINITIS, UNSPECIFIED TRIGGER: Primary | ICD-10-CM

## 2025-01-06 DIAGNOSIS — F17.298 OTHER TOBACCO PRODUCT NICOTINE DEPENDENCE WITH OTHER NICOTINE-INDUCED DISORDER: ICD-10-CM

## 2025-01-06 PROCEDURE — 99214 OFFICE O/P EST MOD 30 MIN: CPT | Performed by: FAMILY MEDICINE

## 2025-01-06 PROCEDURE — G8427 DOCREV CUR MEDS BY ELIG CLIN: HCPCS | Performed by: FAMILY MEDICINE

## 2025-01-06 RX ORDER — FEXOFENADINE HCL 180 MG/1
180 TABLET ORAL DAILY
Qty: 90 TABLET | Refills: 1 | Status: SHIPPED | OUTPATIENT
Start: 2025-01-06

## 2025-01-06 RX ORDER — FLUTICASONE PROPIONATE 50 MCG
1 SPRAY, SUSPENSION (ML) NASAL DAILY
Qty: 32 G | Refills: 1 | Status: SHIPPED | OUTPATIENT
Start: 2025-01-06

## 2025-01-06 RX ORDER — NICOTINE 21 MG/24HR
1 PATCH, TRANSDERMAL 24 HOURS TRANSDERMAL DAILY
Qty: 28 PATCH | Refills: 4 | Status: SHIPPED | OUTPATIENT
Start: 2025-01-06 | End: 2025-05-26

## 2025-01-06 NOTE — PROGRESS NOTES
doing free month of [facility] virtual therapy.     Propranolol: wants to get off this as well, discussed changing to every other day and seeing how he tolerates the change in his heart rate and any anxiety    Nicotine:  - wants to stop, using vape pen all day long  - had stopped in the past, but gum was not helpful  - will start nicotine patches, follow in 2 weeks        10.18.24:    Lisinopril -- causing dry cough, weird s/e like cold kneecaps.  Takes it in the morning, and then feels the worst at 4pm when med is at its full effect.  BP today while on virtual was 125/82, so will trial off lisinopril without starting something to replace it.     Adhd:  - continue 30 mg BID dosing.     Interim between last visit and this visit:  Adhd was not improving -- will return to 20 mg BID for 2 weeks, then adjust to 30 mg BID if tolerating again      8.29.24 - virtual  Having some mucus drainage from his nose, lightheaded if doing outside work    Constipation  -- try miralax daily     Sinus pressure/seasonal allergies:  - start zyrtec    Lightheadedness:  - happens at the daytime when he is outside doing yard work  - will drop propranolol dose to 40 mg daily, stop the 80  mg  - start zyrtec      8.12.24:  Has been very busy with his family, moved back in with them    Wants to go back down to 20 mg adderall, then 10 mg dose  --- Wants to do 20 mg in the morning and 10 mg in the evening  --- will do for 2 mo    Anxiety:  Doing well on buspar BID, no longer doing TID dosing          5.15.24:  Doing well on adderall 30 mg BID  No issues with filling meds, did have some insurance changes but has been able to get his meds as needed    Hypertension:  - well controlled on lisinopril    Anxiety:  - continues buspar, propranolol 80 prn.       9.7.23:  Follow after increasing dose to adderall 30 xr + adderall 20 mg short acting  -- doing well on current dosing    Does feel like he wants to drop down the propranolol dose to 60 mg, as he

## 2025-01-13 ENCOUNTER — TELEPHONE (OUTPATIENT)
Dept: PRIMARY CARE CLINIC | Age: 30
End: 2025-01-13

## 2025-01-13 DIAGNOSIS — F17.298 OTHER TOBACCO PRODUCT NICOTINE DEPENDENCE WITH OTHER NICOTINE-INDUCED DISORDER: Primary | ICD-10-CM

## 2025-01-13 NOTE — TELEPHONE ENCOUNTER
Pt called he states this medication is not working  fexofenadine (ALLEGRA) 180 MG tablet     Pt is asking if he can get step 3 for the Nicotine patch  Pt states step 1& 2 is to stronger     [facility] [id number] - [place] [id number] - P [phone] - F [phone] [[id number]]

## 2025-01-15 NOTE — TELEPHONE ENCOUNTER
I will send in the nicotine patches.  If the allegra is not working he can discontinue taking it at this time

## 2025-01-29 ENCOUNTER — TELEMEDICINE ON DEMAND (OUTPATIENT)
Age: 30
End: 2025-01-29
Payer: COMMERCIAL

## 2025-01-29 DIAGNOSIS — J30.2 SEASONAL ALLERGIC RHINITIS, UNSPECIFIED TRIGGER: ICD-10-CM

## 2025-01-29 PROCEDURE — G8420 CALC BMI NORM PARAMETERS: HCPCS | Performed by: NURSE PRACTITIONER

## 2025-01-29 PROCEDURE — G8427 DOCREV CUR MEDS BY ELIG CLIN: HCPCS | Performed by: NURSE PRACTITIONER

## 2025-01-29 PROCEDURE — 1036F TOBACCO NON-USER: CPT | Performed by: NURSE PRACTITIONER

## 2025-01-29 PROCEDURE — 99213 OFFICE O/P EST LOW 20 MIN: CPT | Performed by: NURSE PRACTITIONER

## 2025-01-29 RX ORDER — FLUTICASONE PROPIONATE 50 MCG
1 SPRAY, SUSPENSION (ML) NASAL DAILY
Qty: 32 G | Refills: 0 | Status: SHIPPED | OUTPATIENT
Start: 2025-01-29

## 2025-01-29 RX ORDER — LORATADINE 10 MG/1
10 TABLET ORAL DAILY
Qty: 30 TABLET | Refills: 0 | Status: SHIPPED | OUTPATIENT
Start: 2025-01-29 | End: 2025-02-28

## 2025-01-29 NOTE — PROGRESS NOTES
Luke Harrison, was evaluated through a synchronous (real-time) audio-video encounter. The patient (or guardian if applicable) is aware that this is a billable service, which includes applicable co-pays. This Virtual Visit was conducted with patient's (and/or legal guardian's) consent. Patient identification was verified, and a caregiver was present when appropriate.   The patient was located at Home: [address] [place] [id number]  Provider was located at Home (Appt Dept State): [place]  Confirm you are appropriately licensed, registered, or certified to deliver care in the state where the patient is located as indicated above. If you are not or unsure, please re-schedule the visit: Yes, I confirm.     Luke Harrison (:  1995) is a Established patient, presenting virtually for evaluation of the following:    Taking Allegra but feels it stops working custodial through the day      Below is the assessment and plan developed based on review of pertinent history, physical exam, labs, studies, and medications.    Assessment & Plan  Seasonal allergic rhinitis, unspecified trigger    Problem    Orders:    fluticasone (FLONASE) 50 MCG/ACT nasal spray; 1 spray by Each Nostril route daily    loratadine (CLARITIN) 10 MG tablet; Take 1 tablet by mouth daily-he was instructed to take this at night  Take the Allegra in the morning    He was instructed to follow-up in office with his provider if the symptoms do not improve or if they worsen    Also discussed his environmental status and the reason that his symptoms have increased        Subjective   This is a 29-year-old male patient [facility] Dr[facility]  He is having symptoms of environmental allergies in a new environment he states.  He currently is taking Allegra which it states it works for several hours but custodial through the day he feels that it is not working that well.    Allergies      Review of Systems   All other systems

## 2025-02-07 ENCOUNTER — TELEMEDICINE ON DEMAND (OUTPATIENT)
Age: 30
End: 2025-02-07
Payer: COMMERCIAL

## 2025-02-07 DIAGNOSIS — J30.2 SEASONAL ALLERGIC RHINITIS, UNSPECIFIED TRIGGER: ICD-10-CM

## 2025-02-07 PROCEDURE — G8427 DOCREV CUR MEDS BY ELIG CLIN: HCPCS | Performed by: NURSE PRACTITIONER

## 2025-02-07 PROCEDURE — G8420 CALC BMI NORM PARAMETERS: HCPCS | Performed by: NURSE PRACTITIONER

## 2025-02-07 PROCEDURE — 99212 OFFICE O/P EST SF 10 MIN: CPT | Performed by: NURSE PRACTITIONER

## 2025-02-07 PROCEDURE — 1036F TOBACCO NON-USER: CPT | Performed by: NURSE PRACTITIONER

## 2025-02-07 ASSESSMENT — ENCOUNTER SYMPTOMS
SINUS PRESSURE: 1
COUGH: 0
EYE ITCHING: 1
SHORTNESS OF BREATH: 0
RHINORRHEA: 1

## 2025-02-07 NOTE — PROGRESS NOTES
Luke Harrison (:  1995) is a Established patient, here for evaluation of the following:    Allergies (X2 months: Seasonal allergies, congestion)       Assessment & Plan:  Below is the assessment and plan developed based on review of pertinent history, physical exam, labs, studies, and medications.  1. Seasonal allergic rhinitis, unspecified trigger  Continue with allegra, flonase, and claritin. Get refills at pharmacy and send refill requests throught the pharmacy. F/u as planned.  The patient would benefit from future follow up with their usual PCP. As of the end of their Virtualist Visit today, follow up visit status is as follows: Patient to follow up as previously instructed by PCP    Return if symptoms worsen or fail to improve.    Subjective:   Patient reports his allergies have gotten bad in the past two months. He has ear popping, congestion, runny nose. He has been taking loratidine, flonase, and fexofenadine when loratidine doesn't help. Needs refills. He has been smoking still, but he is trying to quit.       Review of Systems   Constitutional:  Negative for fatigue and fever.   HENT:  Positive for congestion, postnasal drip, rhinorrhea, sinus pressure and sneezing.    Eyes:  Positive for itching.   Respiratory:  Negative for cough and shortness of breath.        Objective:  Patient-Reported Vitals  No data recorded         2024     3:57 PM   Patient-Reported Vitals   Patient-Reported Weight 160   Patient-Reported Height 5’8”        Physical Exam:  [INSTRUCTIONS:  \"[x]\" Indicates a positive item  \"[]\" Indicates a negative item  -- DELETE ALL ITEMS NOT EXAMINED]    Constitutional: [x] Appears well-developed and well-nourished [x] No apparent distress      [] Abnormal -     Mental status: [x] Alert and awake  [x] Oriented to person/place/time [x] Able to follow commands    [] Abnormal -     Eyes:   EOM    [x]  Normal    [] Abnormal -   Sclera  [x]  Normal    [] Abnormal -          Discharge [x]

## 2025-02-13 ENCOUNTER — TELEPHONE (OUTPATIENT)
Age: 30
End: 2025-02-13

## 2025-02-13 NOTE — TELEPHONE ENCOUNTER
This MA spoke with patient advising to be evaluated in person at a walk in care facility/ urgent care/ ER if unable to wait to be evaluated by pcp on 2/17/25 for allergies/ digestion concern. Patient verbalized understanding.     Thank you

## 2025-04-18 ENCOUNTER — OFFICE VISIT (OUTPATIENT)
Dept: PRIMARY CARE CLINIC | Age: 30
End: 2025-04-18
Payer: COMMERCIAL

## 2025-04-18 VITALS
HEART RATE: 60 BPM | HEIGHT: 67 IN | DIASTOLIC BLOOD PRESSURE: 92 MMHG | BODY MASS INDEX: 24.92 KG/M2 | WEIGHT: 158.8 LBS | TEMPERATURE: 97.6 F | OXYGEN SATURATION: 99 % | SYSTOLIC BLOOD PRESSURE: 134 MMHG

## 2025-04-18 DIAGNOSIS — T14.90XS: ICD-10-CM

## 2025-04-18 DIAGNOSIS — M43.10: ICD-10-CM

## 2025-04-18 DIAGNOSIS — F90.2 ADHD (ATTENTION DEFICIT HYPERACTIVITY DISORDER), COMBINED TYPE: Primary | ICD-10-CM

## 2025-04-18 DIAGNOSIS — F41.0 ANXIETY ATTACK: ICD-10-CM

## 2025-04-18 PROCEDURE — G8420 CALC BMI NORM PARAMETERS: HCPCS | Performed by: FAMILY MEDICINE

## 2025-04-18 PROCEDURE — 1036F TOBACCO NON-USER: CPT | Performed by: FAMILY MEDICINE

## 2025-04-18 PROCEDURE — G8427 DOCREV CUR MEDS BY ELIG CLIN: HCPCS | Performed by: FAMILY MEDICINE

## 2025-04-18 PROCEDURE — 99214 OFFICE O/P EST MOD 30 MIN: CPT | Performed by: FAMILY MEDICINE

## 2025-04-18 PROCEDURE — 3075F SYST BP GE 130 - 139MM HG: CPT | Performed by: FAMILY MEDICINE

## 2025-04-18 PROCEDURE — 3080F DIAST BP >= 90 MM HG: CPT | Performed by: FAMILY MEDICINE

## 2025-04-18 RX ORDER — DEXTROAMPHETAMINE SACCHARATE, AMPHETAMINE ASPARTATE MONOHYDRATE, DEXTROAMPHETAMINE SULFATE AND AMPHETAMINE SULFATE 2.5; 2.5; 2.5; 2.5 MG/1; MG/1; MG/1; MG/1
10 CAPSULE, EXTENDED RELEASE ORAL DAILY
Qty: 30 CAPSULE | Refills: 0 | Status: SHIPPED | OUTPATIENT
Start: 2025-04-18 | End: 2025-04-18

## 2025-04-18 RX ORDER — DEXTROAMPHETAMINE SACCHARATE, AMPHETAMINE ASPARTATE MONOHYDRATE, DEXTROAMPHETAMINE SULFATE AND AMPHETAMINE SULFATE 2.5; 2.5; 2.5; 2.5 MG/1; MG/1; MG/1; MG/1
10 CAPSULE, EXTENDED RELEASE ORAL DAILY
Qty: 30 CAPSULE | Refills: 0 | Status: SHIPPED | OUTPATIENT
Start: 2025-04-18 | End: 2025-05-18

## 2025-04-18 SDOH — ECONOMIC STABILITY: FOOD INSECURITY: WITHIN THE PAST 12 MONTHS, YOU WORRIED THAT YOUR FOOD WOULD RUN OUT BEFORE YOU GOT MONEY TO BUY MORE.: NEVER TRUE

## 2025-04-18 SDOH — ECONOMIC STABILITY: FOOD INSECURITY: WITHIN THE PAST 12 MONTHS, THE FOOD YOU BOUGHT JUST DIDN'T LAST AND YOU DIDN'T HAVE MONEY TO GET MORE.: NEVER TRUE

## 2025-04-18 ASSESSMENT — ENCOUNTER SYMPTOMS
CONSTIPATION: 0
VOMITING: 0
DIARRHEA: 0
COUGH: 0
NAUSEA: 0
SHORTNESS OF BREATH: 0

## 2025-04-18 ASSESSMENT — PATIENT HEALTH QUESTIONNAIRE - PHQ9
SUM OF ALL RESPONSES TO PHQ QUESTIONS 1-9: 0
SUM OF ALL RESPONSES TO PHQ QUESTIONS 1-9: 0
2. FEELING DOWN, DEPRESSED OR HOPELESS: NOT AT ALL
SUM OF ALL RESPONSES TO PHQ QUESTIONS 1-9: 0
SUM OF ALL RESPONSES TO PHQ QUESTIONS 1-9: 0
1. LITTLE INTEREST OR PLEASURE IN DOING THINGS: NOT AT ALL

## 2025-04-18 NOTE — PROGRESS NOTES
appointments are available every day the office is open: Monday, Tuesday, Wednesday, Thursday, and Friday.  Call during office hours to schedule, even if it may not be with your regular physician. You may also call the office after 8 am on office days if you need to be seen from an issue the night before.    During hours when the office is not normally open, your call will go to the messaging service which cannot provide any service other than paging the doctor. No prescriptions or other nonurgent matters will be handled and no voicemail is available, so please call back during office hours for these matters.       Electronically signed by Jennifer Shetty DO on 4/18/2025 at 9:48 AM.

## 2025-04-20 ENCOUNTER — TELEMEDICINE ON DEMAND (OUTPATIENT)
Age: 30
End: 2025-04-20
Payer: COMMERCIAL

## 2025-04-20 DIAGNOSIS — F41.1 GENERALIZED ANXIETY DISORDER: Primary | ICD-10-CM

## 2025-04-20 PROCEDURE — G8420 CALC BMI NORM PARAMETERS: HCPCS | Performed by: NURSE PRACTITIONER

## 2025-04-20 PROCEDURE — 1036F TOBACCO NON-USER: CPT | Performed by: NURSE PRACTITIONER

## 2025-04-20 PROCEDURE — 99213 OFFICE O/P EST LOW 20 MIN: CPT | Performed by: NURSE PRACTITIONER

## 2025-04-20 PROCEDURE — G8427 DOCREV CUR MEDS BY ELIG CLIN: HCPCS | Performed by: NURSE PRACTITIONER

## 2025-04-20 RX ORDER — ESCITALOPRAM OXALATE 10 MG/1
TABLET ORAL
Qty: 30 TABLET | Refills: 0 | Status: SHIPPED | OUTPATIENT
Start: 2025-04-20

## 2025-04-21 ENCOUNTER — OFFICE VISIT (OUTPATIENT)
Dept: ORTHOPEDIC SURGERY | Age: 30
End: 2025-04-21
Payer: COMMERCIAL

## 2025-04-21 VITALS — BODY MASS INDEX: 24.64 KG/M2 | WEIGHT: 157 LBS | HEIGHT: 67 IN

## 2025-04-21 DIAGNOSIS — M43.17 SPONDYLOLISTHESIS OF LUMBOSACRAL REGION: Primary | ICD-10-CM

## 2025-04-21 PROCEDURE — 1036F TOBACCO NON-USER: CPT | Performed by: PHYSICIAN ASSISTANT

## 2025-04-21 PROCEDURE — 99203 OFFICE O/P NEW LOW 30 MIN: CPT | Performed by: PHYSICIAN ASSISTANT

## 2025-04-21 PROCEDURE — G8420 CALC BMI NORM PARAMETERS: HCPCS | Performed by: PHYSICIAN ASSISTANT

## 2025-04-21 PROCEDURE — G8427 DOCREV CUR MEDS BY ELIG CLIN: HCPCS | Performed by: PHYSICIAN ASSISTANT

## 2025-04-21 NOTE — PROGRESS NOTES
History of present illness:   Mr. Luke Harrison is a pleasant 29 y.o. male kindly referred by Jennifer Shetty DO for consultation regarding his LBP.   He has a history of a L5-S1 spondylolisthesis that was diagnosed when he was 15 years old.  He has been dealing with low back pain off-and-on for the past 15 years.  Pain has steadily worsened since onset.   Back pain 7/10 VAS.  He does report leg pain, radicular symptoms such as tingling over the posterior lateral lower leg and foot.  Describes the pain as an ache.   Pain is worse with flexion and extension.   He does experience occasional numbness and tingling into the lower extremities.    Denies weakness of his left or right leg.  Denies bowel or bladder dysfunction and saddle anesthesia.   Can sit for a maximum of 30 minutes and stand for a maximum unlimited minutes.   The pain does affect sleep.   Prior medications and treatment have included occasional over-the-counter Naprosyn.  He also takes Tylenol for pain.  He did try a diclofenac for 1 dose which seemed to help.  He has been doing a home stretching exercise program.        Past medical history:  His past medical history has been reviewed.  Past Medical History:   Diagnosis Date    Anxiety     Bronchitis     Depression     Insomnia     Panic attacks     Pneumonia 2009    Spondylosis     Tachycardia 8/5/2021       His past surgical history has been reviewed.  Past Surgical History:   Procedure Laterality Date    APPENDECTOMY      KNEE SURGERY Left     Dr. Huff. Medial meniscus tear    WISDOM TOOTH EXTRACTION           His medications and allergies were reviewed.  Current Outpatient Medications   Medication Sig Dispense Refill    diclofenac (VOLTAREN) 50 MG EC tablet Take 1 tablet by mouth 2 times daily (with meals) 60 tablet 0    escitalopram (LEXAPRO) 10 MG tablet Take 1/2 tablet (5 mg) by mouth daily x 5 days then increase to 1 tab (10 mg) daily. 30 tablet 0    amphetamine-dextroamphetamine (ADDERALL XR)

## 2025-04-23 ENCOUNTER — TELEPHONE (OUTPATIENT)
Dept: PRIMARY CARE CLINIC | Age: 30
End: 2025-04-23

## 2025-04-23 ENCOUNTER — TELEMEDICINE (OUTPATIENT)
Dept: PRIMARY CARE CLINIC | Age: 30
End: 2025-04-23
Payer: COMMERCIAL

## 2025-04-23 DIAGNOSIS — F41.1 GENERALIZED ANXIETY DISORDER: ICD-10-CM

## 2025-04-23 DIAGNOSIS — F90.2 ADHD (ATTENTION DEFICIT HYPERACTIVITY DISORDER), COMBINED TYPE: ICD-10-CM

## 2025-04-23 PROCEDURE — 99214 OFFICE O/P EST MOD 30 MIN: CPT | Performed by: FAMILY MEDICINE

## 2025-04-23 RX ORDER — ESCITALOPRAM OXALATE 10 MG/1
TABLET ORAL
Qty: 30 TABLET | Refills: 0 | Status: SHIPPED | OUTPATIENT
Start: 2025-04-23 | End: 2025-05-23

## 2025-04-23 RX ORDER — DEXTROAMPHETAMINE SACCHARATE, AMPHETAMINE ASPARTATE MONOHYDRATE, DEXTROAMPHETAMINE SULFATE AND AMPHETAMINE SULFATE 2.5; 2.5; 2.5; 2.5 MG/1; MG/1; MG/1; MG/1
CAPSULE, EXTENDED RELEASE ORAL
Qty: 60 CAPSULE | Refills: 0 | Status: SHIPPED | OUTPATIENT
Start: 2025-04-23 | End: 2025-05-23

## 2025-04-23 NOTE — PROGRESS NOTES
[x]  None visible   [] Abnormal -     HENT: [x] Normocephalic, atraumatic  [] Abnormal -   [x] Mouth/Throat: Mucous membranes are moist    External Ears [x] Normal  [] Abnormal -    Neck: [x] No visualized mass [] Abnormal -     Pulmonary/Chest: [x] Respiratory effort normal   [x] No visualized signs of difficulty breathing or respiratory distress        [] Abnormal -      Musculoskeletal:   [x] Normal gait with no signs of ataxia         [x] Normal range of motion of neck        [] Abnormal -     Neurological:        [x] No Facial Asymmetry (Cranial nerve 7 motor function) (limited exam due to video visit)          [x] No gaze palsy        [] Abnormal -          Skin:        [x] No significant exanthematous lesions or discoloration noted on facial skin         [] Abnormal -            Psychiatric:       [x] Normal Affect [] Abnormal -        [x] No Hallucinations    Other pertinent observable physical exam findings:-             --Jennifer Shetty, DO

## 2025-04-23 NOTE — TELEPHONE ENCOUNTER
Spoke with pharmacist informed per provider its o to be filled. Pharmacy stated they will reach out to pt informing of what cash price is

## 2025-04-23 NOTE — TELEPHONE ENCOUNTER
Pt spoke with pharmacy again and stated that the pharmacy made him aware that the provider has to call the pharmacy to give the ok to fill the prescription not the insurance.

## 2025-04-23 NOTE — TELEPHONE ENCOUNTER
Called and spoke with pharmacy pt had rx sent in on 4/18 and new one sent in today pharmacy states they are unable to fill med due to that and  insurance will not cover rx. Informed pt he would need to contact insurance and file a claim stating he lost his rx and will have to wait until hearing back from them since medication is to soon to be filled and wont be covered. Pt expressed understanding

## 2025-04-23 NOTE — TELEPHONE ENCOUNTER
Patient calling states his pharmacy will not fill medication Amphetamine-dextroamphetamine Adderall XR) 10 MG extended release capsule states  aware he loss the medication bottle pls call pharmacy to fill medication any questions pls return call back @ [phone] to discuss

## 2025-04-23 NOTE — ASSESSMENT & PLAN NOTE
Chronic, not at goal (unstable), changes made today: go back to BID dosing    Orders:    amphetamine-dextroamphetamine (ADDERALL XR) 10 MG extended release capsule; Take one dose first thing in the morning, and second dose at 1 pm.

## 2025-04-24 DIAGNOSIS — J30.2 SEASONAL ALLERGIC RHINITIS, UNSPECIFIED TRIGGER: ICD-10-CM

## 2025-04-24 NOTE — TELEPHONE ENCOUNTER
Medication:   Requested Prescriptions     Pending Prescriptions Disp Refills    fexofenadine (ALLEGRA) 180 MG tablet 90 tablet 1     Sig: Take 1 tablet by mouth daily        Last Filled:      Patient Phone Number: [phone] (home)     Last appt: 4/23/2025   Next appt: 6/18/2025    Last OARRS:       8/7/2023     8:46 AM   RX Monitoring   Periodic Controlled Substance Monitoring Possible medication side effects, risk of tolerance/dependence & alternative treatments discussed.;No signs of potential drug abuse or diversion identified.

## 2025-04-25 RX ORDER — FEXOFENADINE HCL 180 MG/1
180 TABLET ORAL DAILY
Qty: 90 TABLET | Refills: 1 | Status: SHIPPED | OUTPATIENT
Start: 2025-04-25

## 2025-04-28 ENCOUNTER — HOSPITAL ENCOUNTER (OUTPATIENT)
Dept: PHYSICAL THERAPY | Age: 30
Setting detail: THERAPIES SERIES
Discharge: HOME | End: 2025-04-28
Attending: FAMILY MEDICINE
Payer: COMMERCIAL

## 2025-04-28 DIAGNOSIS — R68.89 DECREASED ABILITY TO PERFORM ACTIVITIES: ICD-10-CM

## 2025-04-28 DIAGNOSIS — Z56.89 DIFFICULTY PERFORMING WORK ACTIVITIES: ICD-10-CM

## 2025-04-28 DIAGNOSIS — R68.89 DECREASED EXERCISE TOLERANCE: ICD-10-CM

## 2025-04-28 DIAGNOSIS — R53.1 FUNCTIONAL WEAKNESS: ICD-10-CM

## 2025-04-28 DIAGNOSIS — M54.9 BACK PAIN, UNSPECIFIED BACK LOCATION, UNSPECIFIED BACK PAIN LATERALITY, UNSPECIFIED CHRONICITY: ICD-10-CM

## 2025-04-28 DIAGNOSIS — R52 PAIN: Primary | ICD-10-CM

## 2025-04-28 DIAGNOSIS — Z78.9 NEED FOR HOME EXERCISE PROGRAM: ICD-10-CM

## 2025-04-28 DIAGNOSIS — M25.60 LIMITED JOINT RANGE OF MOTION (ROM): ICD-10-CM

## 2025-04-28 DIAGNOSIS — R68.89 DECREASED ACTIVITY TOLERANCE: ICD-10-CM

## 2025-04-28 DIAGNOSIS — G47.9 DIFFICULTY SLEEPING: ICD-10-CM

## 2025-04-28 PROCEDURE — 97530 THERAPEUTIC ACTIVITIES: CPT

## 2025-04-28 PROCEDURE — 97110 THERAPEUTIC EXERCISES: CPT

## 2025-04-28 PROCEDURE — 97162 PT EVAL MOD COMPLEX 30 MIN: CPT

## 2025-04-28 NOTE — PLAN OF CARE
Joint Mobility Testing/Accessory Motions:      Lumbar: hypomobility of Left sided    Special Tests:  Incr neural tension bilat: approx 45* SLR R,L    Gait:    Pattern: WNL  Assistive Device Used: no AD    Balance:  [x] WNL      [] NT       [] Dysfunction noted  Comment:     Falls Risk Assessment (30 days):   Falls Risk assessed and no intervention required.  Time Up and Go (TUG):   Not Assessed        Exercises/Interventions     Therapeutic Ex (71654)  Resistance Sets/ Reps/ Time X= Completed  > = add prn Notes/Cues/Progressions   cardio       AAROM/ AROM              Cat camel  x15 x    Quad bird dog  5s x 10 x    Hookly bridge   >add           planks Side/ prone modified             Pallof press   >                  Pt Edu   x Pathology, role of PT, prognosis, expectations, time frame for recovery, HEP review   Manual Intervention (99775)  TIME     manual  8' x Lumbar PA assessment and light STM paraspinals                               NMR re-education (41193) Resistance Sets/ Reps/ Time Completed  CUES NEEDED                                                    Therapeutic Activity (64821)  Sets/time     Pt edu   8' x Pain neuroscience edu, role of stress w/ chronic pain, Reviewed imaging in what seems stable tess, and encouraged him to get more active and work on core strength              Modalities:    No modalities applied this session    Education/Home Exercise Program: Access Code: [email or web address]  URL: [email or web address]  Date: 04/28/2025  Prepared by: Korey Carey    Exercises  - Supine 90/90 Sciatic Nerve Glide with Knee Flexion/Extension  - 2-3 x daily - 4-6 x weekly - 2-3 sets - 10-15 reps  - Cat Cow  - 2-3 x daily - 4-6 x weekly - 10-15 reps - 3sec hold  - Bird Dog  - 2-3 x daily - 4-6 x weekly - 10-15 reps - 5-15sec hold      ASSESSMENT   Assessment:   Luke Harrison is a 29 y.o. male presenting today to Outpatient PT with signs and symptoms consistent with subacute on chronic back

## 2025-04-30 ENCOUNTER — OFFICE VISIT (OUTPATIENT)
Dept: PSYCHOLOGY | Age: 30
End: 2025-04-30
Payer: COMMERCIAL

## 2025-04-30 ENCOUNTER — TELEMEDICINE (OUTPATIENT)
Dept: PRIMARY CARE CLINIC | Age: 30
End: 2025-04-30
Payer: COMMERCIAL

## 2025-04-30 DIAGNOSIS — F90.2 ADHD (ATTENTION DEFICIT HYPERACTIVITY DISORDER), COMBINED TYPE: Primary | ICD-10-CM

## 2025-04-30 DIAGNOSIS — F90.2 ADHD (ATTENTION DEFICIT HYPERACTIVITY DISORDER), COMBINED TYPE: Primary | Chronic | ICD-10-CM

## 2025-04-30 PROCEDURE — 1036F TOBACCO NON-USER: CPT | Performed by: STUDENT IN AN ORGANIZED HEALTH CARE EDUCATION/TRAINING PROGRAM

## 2025-04-30 PROCEDURE — 99214 OFFICE O/P EST MOD 30 MIN: CPT | Performed by: FAMILY MEDICINE

## 2025-04-30 PROCEDURE — 90834 PSYTX W PT 45 MINUTES: CPT | Performed by: STUDENT IN AN ORGANIZED HEALTH CARE EDUCATION/TRAINING PROGRAM

## 2025-04-30 RX ORDER — DEXTROAMPHETAMINE SACCHARATE, AMPHETAMINE ASPARTATE, DEXTROAMPHETAMINE SULFATE AND AMPHETAMINE SULFATE 2.5; 2.5; 2.5; 2.5 MG/1; MG/1; MG/1; MG/1
TABLET ORAL
Qty: 7 TABLET | Refills: 0 | Status: SHIPPED | OUTPATIENT
Start: 2025-04-30 | End: 2025-05-07

## 2025-04-30 NOTE — PROGRESS NOTES
Luke Harrison, was evaluated through a synchronous (real-time) audio-video encounter. The patient (or guardian if applicable) is aware that this is a billable service, which includes applicable co-pays. This Virtual Visit was conducted with patient's (and/or legal guardian's) consent. Patient identification was verified, and a caregiver was present when appropriate.   The patient was located at Home: [address] [place] [id number]  Provider was located at Facility (Appt Dept): [address] [place], [place] [id number]  Confirm you are appropriately licensed, registered, or certified to deliver care in the state where the patient is located as indicated above. If you are not or unsure, please re-schedule the visit: Yes, I confirm.     Luke Harrison (:  1995) is a Established patient, presenting virtually for evaluation of the following:      Below is the assessment and plan developed based on review of pertinent history, physical exam, labs, studies, and medications.     Assessment & Plan  ADHD (attention deficit hyperactivity disorder), combined type       Orders:    amphetamine-dextroamphetamine (ADDERALL, 10MG,) 10 MG tablet; Take at 4 or 5 pm, in addition to the previous 10 mg XR doses      No follow-ups on file.       Subjective   4.30.25:  Adhd follow up, again    Will adjust meds to allow for a 10 mg IR dose of adderall, to account for patient needing to be awake for longer periods of time, from 4 am until 11 pm.   Taking first dose of 10 mg XR at 4 am, second dose at 11am, and then will take this IR dose at 4 or 5 pm.    Pt does have appt with psychologist today, is very concerned he could be on the autism spectrum and wants some clarity to help provide him some closure.     Subjective:  - Luke reports experiencing side effects from Lexapro but notes having \"windows of clarity\" that are significantly better than previous experiences with Bralar (which was prescribed when he was

## 2025-04-30 NOTE — PROGRESS NOTES
Behavioral Health Consultation  Nilson Mccall PsyD  Licensed Psychologist  4/30/2025  8:14 AM      Time spent with Patient: 45 minutes  This is patient's first  [facility] appointment.    Reason for Consult:  Autism Spectrum Disorder,   Referring Provider: Jennifer Shetty DO (PCP)    Pt provided informed consent for the behavioral health program. Discussed with patient model of service to include the limits of confidentiality (i.e. abuse reporting, suicide intervention, etc.) and short-term intervention focused approach.  Pt indicated understanding.  Feedback given to PCP.    S:  Pt shared his mental health treatment history where he was misdiagnosed with bipolar disorder as a child. Pt shared that his life changed when he was diagnosed with ADHD and was properly medicated at 25-years-old. Pt shared that now he wonders if he may have Autism Spectrum Disorder and that he would like to explore the process to evaluate him for the diagnosis. Pt shared he was always considered sensitive as a child and that he has always had rituals and organizational techniques that helped him feel comfortable. Pt shared that he has struggled with over-stimulation in his life. Pt shared his main motivation is for clarity and to be able to support his daughter as she gets older and may develop mental health diagnoses as well.     O:  MSE:    Appearance    Jittery, well kept, alert, cooperative, healthy, mild distress  Appetite Did not assess  Sleep disturbance No  Fatigue No  Loss of pleasure No  Impulsive behavior No  Speech    normal volume, well articulated, and rapid  Mood    euythymic  Affect    normal affect  Thought Content    excessive preoccupations  Thought Process    linear, goal directed, and coherent  Associations    logical connections  Insight    Good  Judgment    Intact  Orientation    oriented to person, place, time, and general circumstances  Memory    recent and remote memory intact  Attention/Concentration

## 2025-04-30 NOTE — ASSESSMENT & PLAN NOTE
Orders:    amphetamine-dextroamphetamine (ADDERALL, 10MG,) 10 MG tablet; Take at 4 or 5 pm, in addition to the previous 10 mg XR doses

## 2025-05-07 ENCOUNTER — TELEMEDICINE (OUTPATIENT)
Dept: PRIMARY CARE CLINIC | Age: 30
End: 2025-05-07
Payer: COMMERCIAL

## 2025-05-07 DIAGNOSIS — F90.2 ADHD (ATTENTION DEFICIT HYPERACTIVITY DISORDER), COMBINED TYPE: Primary | ICD-10-CM

## 2025-05-07 PROCEDURE — 99214 OFFICE O/P EST MOD 30 MIN: CPT | Performed by: FAMILY MEDICINE

## 2025-05-07 RX ORDER — DEXTROAMPHETAMINE SACCHARATE, AMPHETAMINE ASPARTATE MONOHYDRATE, DEXTROAMPHETAMINE SULFATE AND AMPHETAMINE SULFATE 5; 5; 5; 5 MG/1; MG/1; MG/1; MG/1
20 CAPSULE, EXTENDED RELEASE ORAL DAILY
Qty: 30 CAPSULE | Refills: 0 | Status: SHIPPED | OUTPATIENT
Start: 2025-05-07 | End: 2025-06-06

## 2025-05-07 RX ORDER — DEXTROAMPHETAMINE SACCHARATE, AMPHETAMINE ASPARTATE, DEXTROAMPHETAMINE SULFATE AND AMPHETAMINE SULFATE 5; 5; 5; 5 MG/1; MG/1; MG/1; MG/1
20 TABLET ORAL DAILY
Qty: 30 TABLET | Refills: 0 | Status: SHIPPED | OUTPATIENT
Start: 2025-05-07 | End: 2025-06-06

## 2025-05-07 NOTE — ASSESSMENT & PLAN NOTE
Orders:    amphetamine-dextroamphetamine (ADDERALL XR) 20 MG extended release capsule; Take 1 capsule by mouth daily for 30 days. Max Daily Amount: 20 mg    amphetamine-dextroamphetamine (ADDERALL, 20MG,) 20 MG tablet; Take 1 tablet by mouth daily for 30 days. Max Daily Amount: 20 mg

## 2025-05-07 NOTE — PROGRESS NOTES
respiratory distress        [] Abnormal -      Musculoskeletal:   [x] Normal gait with no signs of ataxia         [x] Normal range of motion of neck        [] Abnormal -     Neurological:        [x] No Facial Asymmetry (Cranial nerve 7 motor function) (limited exam due to video visit)          [x] No gaze palsy        [] Abnormal -          Skin:        [x] No significant exanthematous lesions or discoloration noted on facial skin         [] Abnormal -            Psychiatric:       [x] Normal Affect [] Abnormal -        [x] No Hallucinations    Other pertinent observable physical exam findings:-             --Jennifer Shetty, DO

## 2025-05-20 DIAGNOSIS — F17.298 OTHER TOBACCO PRODUCT NICOTINE DEPENDENCE WITH OTHER NICOTINE-INDUCED DISORDER: ICD-10-CM

## 2025-05-20 NOTE — TELEPHONE ENCOUNTER
Medication:   Requested Prescriptions     Pending Prescriptions Disp Refills    nicotine (NICODERM CQ) 7 MG/24HR [Pharmacy Med Name: NICOTINE 7 MG/24HR PATCH] 14 patch 0     Sig: PLACE 1 PATCH ONTO THE SKIN DAILY FOR 14 DAYS        Last Filled:      Patient Phone Number: [phone] (home)     Last appt: 5/7/2025   Next appt: 6/18/2025    Last OARRS:       8/7/2023     8:46 AM   RX Monitoring   Periodic Controlled Substance Monitoring Possible medication side effects, risk of tolerance/dependence & alternative treatments discussed.;No signs of potential drug abuse or diversion identified.

## 2025-06-03 DIAGNOSIS — F41.1 GENERALIZED ANXIETY DISORDER: ICD-10-CM

## 2025-06-03 DIAGNOSIS — F90.2 ADHD (ATTENTION DEFICIT HYPERACTIVITY DISORDER), COMBINED TYPE: ICD-10-CM

## 2025-06-03 NOTE — TELEPHONE ENCOUNTER
Medication:   Requested Prescriptions     Pending Prescriptions Disp Refills    escitalopram (LEXAPRO) 10 MG tablet 30 tablet 0     Sig: Take one tablet daily    amphetamine-dextroamphetamine (ADDERALL XR) 20 MG extended release capsule 30 capsule 0     Sig: Take 1 capsule by mouth daily for 30 days. Max Daily Amount: 20 mg    amphetamine-dextroamphetamine (ADDERALL, 20MG,) 20 MG tablet 30 tablet 0     Sig: Take 1 tablet by mouth daily for 30 days. Max Daily Amount: 20 mg        Last Filled:      Patient Phone Number: [phone] (home)     Last appt: 5/7/2025   Next appt: 6/3/2025    Last OARRS:       8/7/2023     8:46 AM   RX Monitoring   Periodic Controlled Substance Monitoring Possible medication side effects, risk of tolerance/dependence & alternative treatments discussed.;No signs of potential drug abuse or diversion identified.

## 2025-06-03 NOTE — TELEPHONE ENCOUNTER
Medication:   Requested Prescriptions     Pending Prescriptions Disp Refills    escitalopram (LEXAPRO) 10 MG tablet [Pharmacy Med Name: ESCITALOPRAM 10MG TABLETS] 30 tablet 0     Sig: TAKE 1 TABLET BY MOUTH DAILY        Last Filled:      Patient Phone Number: [phone] (home)     Last appt: 5/7/2025   Next appt: 6/5/2025    Last OARRS:       8/7/2023     8:46 AM   RX Monitoring   Periodic Controlled Substance Monitoring Possible medication side effects, risk of tolerance/dependence & alternative treatments discussed.;No signs of potential drug abuse or diversion identified.

## 2025-06-04 RX ORDER — DEXTROAMPHETAMINE SACCHARATE, AMPHETAMINE ASPARTATE, DEXTROAMPHETAMINE SULFATE AND AMPHETAMINE SULFATE 5; 5; 5; 5 MG/1; MG/1; MG/1; MG/1
20 TABLET ORAL DAILY
Qty: 30 TABLET | Refills: 0 | Status: SHIPPED | OUTPATIENT
Start: 2025-06-04 | End: 2025-07-04

## 2025-06-04 RX ORDER — ESCITALOPRAM OXALATE 10 MG/1
TABLET ORAL
Qty: 30 TABLET | Refills: 0 | Status: SHIPPED | OUTPATIENT
Start: 2025-06-04 | End: 2025-06-05 | Stop reason: SDUPTHER

## 2025-06-04 RX ORDER — DEXTROAMPHETAMINE SACCHARATE, AMPHETAMINE ASPARTATE MONOHYDRATE, DEXTROAMPHETAMINE SULFATE AND AMPHETAMINE SULFATE 5; 5; 5; 5 MG/1; MG/1; MG/1; MG/1
20 CAPSULE, EXTENDED RELEASE ORAL DAILY
Qty: 30 CAPSULE | Refills: 0 | Status: SHIPPED | OUTPATIENT
Start: 2025-06-04 | End: 2025-07-04

## 2025-06-05 ENCOUNTER — TELEMEDICINE (OUTPATIENT)
Dept: PRIMARY CARE CLINIC | Age: 30
End: 2025-06-05
Payer: COMMERCIAL

## 2025-06-05 DIAGNOSIS — F84.0 AUTISM SPECTRUM DISORDER: Primary | ICD-10-CM

## 2025-06-05 DIAGNOSIS — F41.1 GENERALIZED ANXIETY DISORDER: ICD-10-CM

## 2025-06-05 PROCEDURE — 99214 OFFICE O/P EST MOD 30 MIN: CPT | Performed by: FAMILY MEDICINE

## 2025-06-05 RX ORDER — ESCITALOPRAM OXALATE 10 MG/1
10 TABLET ORAL DAILY
Qty: 30 TABLET | Refills: 0 | OUTPATIENT
Start: 2025-06-05

## 2025-06-05 RX ORDER — ESCITALOPRAM OXALATE 10 MG/1
20 TABLET ORAL DAILY
Qty: 180 TABLET | Refills: 0 | Status: SHIPPED | OUTPATIENT
Start: 2025-06-05 | End: 2025-09-03

## 2025-06-18 ENCOUNTER — OFFICE VISIT (OUTPATIENT)
Dept: PRIMARY CARE CLINIC | Age: 30
End: 2025-06-18
Payer: COMMERCIAL

## 2025-06-18 VITALS
TEMPERATURE: 97.9 F | BODY MASS INDEX: 24.27 KG/M2 | HEART RATE: 90 BPM | DIASTOLIC BLOOD PRESSURE: 86 MMHG | SYSTOLIC BLOOD PRESSURE: 126 MMHG | WEIGHT: 154.6 LBS | HEIGHT: 67 IN | OXYGEN SATURATION: 98 %

## 2025-06-18 DIAGNOSIS — F90.2 ADHD (ATTENTION DEFICIT HYPERACTIVITY DISORDER), COMBINED TYPE: Primary | ICD-10-CM

## 2025-06-18 DIAGNOSIS — F41.1 GENERALIZED ANXIETY DISORDER: ICD-10-CM

## 2025-06-18 PROCEDURE — 99214 OFFICE O/P EST MOD 30 MIN: CPT | Performed by: FAMILY MEDICINE

## 2025-06-18 PROCEDURE — 1036F TOBACCO NON-USER: CPT | Performed by: FAMILY MEDICINE

## 2025-06-18 PROCEDURE — G8427 DOCREV CUR MEDS BY ELIG CLIN: HCPCS | Performed by: FAMILY MEDICINE

## 2025-06-18 PROCEDURE — 3079F DIAST BP 80-89 MM HG: CPT | Performed by: FAMILY MEDICINE

## 2025-06-18 PROCEDURE — 3074F SYST BP LT 130 MM HG: CPT | Performed by: FAMILY MEDICINE

## 2025-06-18 PROCEDURE — G8420 CALC BMI NORM PARAMETERS: HCPCS | Performed by: FAMILY MEDICINE

## 2025-06-18 RX ORDER — GUANFACINE 2 MG/1
TABLET ORAL
Qty: 30 TABLET | Refills: 3 | Status: SHIPPED | OUTPATIENT
Start: 2025-06-18 | End: 2025-07-16

## 2025-06-18 RX ORDER — DEXTROAMPHETAMINE SACCHARATE, AMPHETAMINE ASPARTATE, DEXTROAMPHETAMINE SULFATE AND AMPHETAMINE SULFATE 7.5; 7.5; 7.5; 7.5 MG/1; MG/1; MG/1; MG/1
30 TABLET ORAL DAILY
Qty: 30 TABLET | Refills: 0 | Status: SHIPPED | OUTPATIENT
Start: 2025-06-18 | End: 2025-07-18

## 2025-06-18 ASSESSMENT — ENCOUNTER SYMPTOMS
COUGH: 0
NAUSEA: 0
CONSTIPATION: 0
VOMITING: 0
SHORTNESS OF BREATH: 0
DIARRHEA: 0

## 2025-06-18 NOTE — PROGRESS NOTES
Luke Harrison (:  1995) is a 29 y.o. male,Established patient, here for evaluation of the following chief complaint(s):  Medication Check (2 month follow up /Pt states with lexapro he was getting stomach pain for about 10 days )      SUBJECTIVE:  6.18.25:  Went back down to 10 mg lexapro, 20 mg was too much  Allegra - causing nose to be dry. Can use saline spray    Subjective:  - Request for medication adjustment for attention deficit hyperactivity disorder.  - Symptoms of attention deficit hyperactivity disorder are affecting daily life, specifically the ability to maintain focus and hold things together.    Past Medical History:  - No significant past medical history mentioned.    Objective:  - No vital signs or physical examination findings mentioned.    Assessment & Plan:  1. Attention Deficit Hyperactivity Disorder  - Assessment: Attention deficit hyperactivity disorder.  - Treatment planned:  - Continue extended release medication in the morning.  - Initiate Guanfacine at one milligram daily for two weeks, then increase to two milligrams daily. This medication is expected to show efficacy within the first week. It is also noted to have an alpha receptor effect, which can sometimes help with blood pressure.     2. QING:  - stable, chronic  - continue 10 mg prozac dosing.         6.5.25: virtual  Adhd med adjustments    Subjective:  - Luke reports experiencing some residual anxiety symptoms, describing \"anxious thoughts\" that he previously couldn't feel.  - He mentions having \"a little bit of toe curling\".  - Reports positive response to current medication, stating \"it works great\" but feels symptoms are not completely resolved.  - Discusses autism spectrum and social challenges, noting difficulty with social interactions and \"not wanting to say something.\"  - Reports he has stopped \"talking out to AI \"as much because he doesn't trust himself.    Past Medical History:  - Currently taking Lexapro

## 2025-07-03 ENCOUNTER — TELEPHONE (OUTPATIENT)
Dept: PRIMARY CARE CLINIC | Age: 30
End: 2025-07-03

## 2025-07-03 DIAGNOSIS — F90.2 ADHD (ATTENTION DEFICIT HYPERACTIVITY DISORDER), COMBINED TYPE: ICD-10-CM

## 2025-07-03 DIAGNOSIS — J30.2 SEASONAL ALLERGIC RHINITIS, UNSPECIFIED TRIGGER: ICD-10-CM

## 2025-07-03 RX ORDER — DEXTROAMPHETAMINE SACCHARATE, AMPHETAMINE ASPARTATE MONOHYDRATE, DEXTROAMPHETAMINE SULFATE AND AMPHETAMINE SULFATE 5; 5; 5; 5 MG/1; MG/1; MG/1; MG/1
20 CAPSULE, EXTENDED RELEASE ORAL DAILY
Qty: 30 CAPSULE | Refills: 0 | Status: SHIPPED | OUTPATIENT
Start: 2025-07-03 | End: 2025-08-02

## 2025-07-03 NOTE — TELEPHONE ENCOUNTER
Attempted to reach pt to see which pharmacy he would like medication to be sent to no answer left vm to call back

## 2025-07-15 DIAGNOSIS — F90.2 ADHD (ATTENTION DEFICIT HYPERACTIVITY DISORDER), COMBINED TYPE: ICD-10-CM

## 2025-07-15 NOTE — TELEPHONE ENCOUNTER
Medication:   Requested Prescriptions     Pending Prescriptions Disp Refills    amphetamine-dextroamphetamine (ADDERALL, 30MG,) 30 MG tablet 30 tablet 0     Sig: Take 1 tablet by mouth daily for 30 days. Max Daily Amount: 30 mg        Last Filled:      Patient Phone Number: [phone] (home)     Last appt: 6/18/2025   Next appt: 7/28/2025    Last OARRS:       8/7/2023     8:46 AM   RX Monitoring   Periodic Controlled Substance Monitoring Possible medication side effects, risk of tolerance/dependence & alternative treatments discussed.;No signs of potential drug abuse or diversion identified.

## 2025-07-15 NOTE — TELEPHONE ENCOUNTER
Patient need refills on ADDERALL, 30MG   [id number] - [place], [place] - [address] - P [phone] - F [phone] [address] [facility], [address] [address]  Phone: [phone]  Fax: [phone]

## 2025-07-16 RX ORDER — DEXTROAMPHETAMINE SACCHARATE, AMPHETAMINE ASPARTATE, DEXTROAMPHETAMINE SULFATE AND AMPHETAMINE SULFATE 7.5; 7.5; 7.5; 7.5 MG/1; MG/1; MG/1; MG/1
30 TABLET ORAL DAILY
Qty: 13 TABLET | Refills: 0 | Status: SHIPPED | OUTPATIENT
Start: 2025-07-16 | End: 2025-07-29

## 2025-07-17 ENCOUNTER — TELEPHONE (OUTPATIENT)
Dept: PRIMARY CARE CLINIC | Age: 30
End: 2025-07-17

## 2025-07-17 DIAGNOSIS — F90.2 ADHD (ATTENTION DEFICIT HYPERACTIVITY DISORDER), COMBINED TYPE: ICD-10-CM

## 2025-07-17 RX ORDER — GUANFACINE 2 MG/1
TABLET ORAL
Qty: 30 TABLET | Refills: 3 | Status: CANCELLED | OUTPATIENT
Start: 2025-07-17 | End: 2025-08-14

## 2025-07-17 RX ORDER — GUANFACINE 2 MG/1
2 TABLET, EXTENDED RELEASE ORAL DAILY
Qty: 30 TABLET | Refills: 0 | Status: SHIPPED | OUTPATIENT
Start: 2025-07-17 | End: 2025-07-28

## 2025-07-28 ENCOUNTER — OFFICE VISIT (OUTPATIENT)
Dept: PRIMARY CARE CLINIC | Age: 30
End: 2025-07-28
Payer: COMMERCIAL

## 2025-07-28 VITALS
OXYGEN SATURATION: 98 % | BODY MASS INDEX: 25.83 KG/M2 | HEART RATE: 84 BPM | WEIGHT: 164.6 LBS | HEIGHT: 67 IN | TEMPERATURE: 97 F | DIASTOLIC BLOOD PRESSURE: 77 MMHG | SYSTOLIC BLOOD PRESSURE: 115 MMHG

## 2025-07-28 DIAGNOSIS — F90.2 ADHD (ATTENTION DEFICIT HYPERACTIVITY DISORDER), COMBINED TYPE: ICD-10-CM

## 2025-07-28 PROCEDURE — 99214 OFFICE O/P EST MOD 30 MIN: CPT | Performed by: FAMILY MEDICINE

## 2025-07-28 PROCEDURE — 3078F DIAST BP <80 MM HG: CPT | Performed by: FAMILY MEDICINE

## 2025-07-28 PROCEDURE — G8419 CALC BMI OUT NRM PARAM NOF/U: HCPCS | Performed by: FAMILY MEDICINE

## 2025-07-28 PROCEDURE — 3074F SYST BP LT 130 MM HG: CPT | Performed by: FAMILY MEDICINE

## 2025-07-28 PROCEDURE — G8427 DOCREV CUR MEDS BY ELIG CLIN: HCPCS | Performed by: FAMILY MEDICINE

## 2025-07-28 PROCEDURE — 1036F TOBACCO NON-USER: CPT | Performed by: FAMILY MEDICINE

## 2025-07-28 RX ORDER — GUANFACINE 1 MG/1
1 TABLET, EXTENDED RELEASE ORAL DAILY
Qty: 30 TABLET | Refills: 0 | Status: SHIPPED | OUTPATIENT
Start: 2025-07-28 | End: 2025-08-27

## 2025-07-28 RX ORDER — DEXTROAMPHETAMINE SACCHARATE, AMPHETAMINE ASPARTATE, DEXTROAMPHETAMINE SULFATE AND AMPHETAMINE SULFATE 7.5; 7.5; 7.5; 7.5 MG/1; MG/1; MG/1; MG/1
30 TABLET ORAL DAILY
Qty: 21 TABLET | Refills: 0 | Status: SHIPPED | OUTPATIENT
Start: 2025-07-28 | End: 2025-08-18

## 2025-07-28 ASSESSMENT — ENCOUNTER SYMPTOMS
VOMITING: 0
SHORTNESS OF BREATH: 0
CONSTIPATION: 0
NAUSEA: 0
DIARRHEA: 0
COUGH: 0

## 2025-07-28 NOTE — PROGRESS NOTES
Luke Harrison (:  1995) is a 30 y.o. male,Established patient, here for evaluation of the following chief complaint(s):  Medication Check (Pt states guanFACINE makes him drowsy and would like to discuss lowering dosage /)      SUBJECTIVE:  7.28.25:    Change guanfacine to 1 mg ER, instead of the 2 mg ER    In the future, may need to adjust Adderall IR dose to 10 mg BID or TID, or change to 20 mg BID    Subjective:  - Discussion today focused on adjustments to Luke Harrison's medication regimen, particularly guanfacine and Adderall, due to ongoing concerns regarding efficacy and side effects. Luke Harrison reports fatigue, which may be related to current medication dosages.  - Luke Harrison indicates that their energy typically returns around 4 or 5 PM.  - Luke Harrison found the short-acting guanfacine too strong, and the 2 mg extended-release formulation also proved to be too strong.  - Luke Harrison also mentioned that 10 mg of instant release Adderall previously caused anxiety and was insufficient on its own. This was not prescribed during the time of using guanfacine    Past Medical History:  - Luke Harrison is currently on Lexapro 10 mg; 20 mg was previously found to be too much.   - Luke Harrison is currently on an instant-release formulation of their ADHD medication (Adderall 30 mg instant release).  - Luke Harrison has a history of autism.    Assessment & Plan:  1. ADHD  - Treatment planned for Guanfacine regimen adjustment: Transition from guanfacine 2 mg extended release to guanfacine 1 mg extended release due to previous dosages being too strong. This change will be implemented first before making multiple medication changes.  - Treatment planned for Adderall regimen: Currently on Adderall 30 mg instant release. Discussion included the possibility of switching to a 20 mg dosage in the future, and potentially a lower dose if guanfacine proves effective. Also discussed returning to a more frequent

## 2025-08-06 ENCOUNTER — TELEPHONE (OUTPATIENT)
Dept: PRIMARY CARE CLINIC | Age: 30
End: 2025-08-06

## 2025-08-08 ENCOUNTER — TELEMEDICINE (OUTPATIENT)
Dept: PRIMARY CARE CLINIC | Age: 30
End: 2025-08-08
Payer: COMMERCIAL

## 2025-08-08 DIAGNOSIS — F90.2 ADHD (ATTENTION DEFICIT HYPERACTIVITY DISORDER), COMBINED TYPE: Primary | ICD-10-CM

## 2025-08-08 PROCEDURE — 99214 OFFICE O/P EST MOD 30 MIN: CPT | Performed by: FAMILY MEDICINE

## 2025-08-08 RX ORDER — DEXTROAMPHETAMINE SACCHARATE, AMPHETAMINE ASPARTATE MONOHYDRATE, DEXTROAMPHETAMINE SULFATE AND AMPHETAMINE SULFATE 7.5; 7.5; 7.5; 7.5 MG/1; MG/1; MG/1; MG/1
30 CAPSULE, EXTENDED RELEASE ORAL DAILY
Qty: 30 CAPSULE | Refills: 0 | Status: SHIPPED | OUTPATIENT
Start: 2025-08-08 | End: 2025-09-07

## 2025-08-08 RX ORDER — DEXTROAMPHETAMINE SACCHARATE, AMPHETAMINE ASPARTATE, DEXTROAMPHETAMINE SULFATE AND AMPHETAMINE SULFATE 5; 5; 5; 5 MG/1; MG/1; MG/1; MG/1
20 TABLET ORAL DAILY
Qty: 30 TABLET | Refills: 0 | Status: SHIPPED | OUTPATIENT
Start: 2025-08-08 | End: 2025-09-07

## 2025-08-26 DIAGNOSIS — F90.2 ADHD (ATTENTION DEFICIT HYPERACTIVITY DISORDER), COMBINED TYPE: ICD-10-CM

## 2025-08-27 RX ORDER — GUANFACINE 1 MG/1
1 TABLET, EXTENDED RELEASE ORAL DAILY
Qty: 30 TABLET | Refills: 1 | Status: SHIPPED | OUTPATIENT
Start: 2025-08-27 | End: 2025-09-26

## 2025-09-04 DIAGNOSIS — J30.2 SEASONAL ALLERGIC RHINITIS, UNSPECIFIED TRIGGER: ICD-10-CM

## 2025-09-04 DIAGNOSIS — F90.2 ADHD (ATTENTION DEFICIT HYPERACTIVITY DISORDER), COMBINED TYPE: ICD-10-CM

## 2025-09-04 DIAGNOSIS — M43.17 SPONDYLOLISTHESIS OF LUMBOSACRAL REGION: ICD-10-CM

## 2025-09-04 DIAGNOSIS — F41.1 GENERALIZED ANXIETY DISORDER: ICD-10-CM

## 2025-09-05 RX ORDER — GUANFACINE 1 MG/1
1 TABLET, EXTENDED RELEASE ORAL DAILY
Qty: 30 TABLET | Refills: 1 | Status: SHIPPED | OUTPATIENT
Start: 2025-09-05 | End: 2025-10-05

## 2025-09-05 RX ORDER — ESCITALOPRAM OXALATE 10 MG/1
20 TABLET ORAL DAILY
Qty: 180 TABLET | Refills: 0 | Status: SHIPPED | OUTPATIENT
Start: 2025-09-05 | End: 2025-12-04

## 2025-09-05 RX ORDER — DEXTROAMPHETAMINE SACCHARATE, AMPHETAMINE ASPARTATE MONOHYDRATE, DEXTROAMPHETAMINE SULFATE AND AMPHETAMINE SULFATE 7.5; 7.5; 7.5; 7.5 MG/1; MG/1; MG/1; MG/1
30 CAPSULE, EXTENDED RELEASE ORAL DAILY
Qty: 30 CAPSULE | Refills: 0 | Status: SHIPPED | OUTPATIENT
Start: 2025-09-05 | End: 2025-10-05

## 2025-09-05 RX ORDER — FEXOFENADINE HCL 180 MG/1
180 TABLET ORAL DAILY
Qty: 90 TABLET | Refills: 1 | Status: SHIPPED | OUTPATIENT
Start: 2025-09-05

## 2025-09-05 RX ORDER — DEXTROAMPHETAMINE SACCHARATE, AMPHETAMINE ASPARTATE, DEXTROAMPHETAMINE SULFATE AND AMPHETAMINE SULFATE 5; 5; 5; 5 MG/1; MG/1; MG/1; MG/1
20 TABLET ORAL DAILY
Qty: 30 TABLET | Refills: 0 | Status: SHIPPED | OUTPATIENT
Start: 2025-09-05 | End: 2025-10-05

## 2025-09-05 RX ORDER — FLUTICASONE PROPIONATE 50 MCG
1 SPRAY, SUSPENSION (ML) NASAL DAILY
Qty: 32 G | Refills: 0 | Status: SHIPPED | OUTPATIENT
Start: 2025-09-05